# Patient Record
Sex: MALE | Race: WHITE | Employment: OTHER | URBAN - METROPOLITAN AREA
[De-identification: names, ages, dates, MRNs, and addresses within clinical notes are randomized per-mention and may not be internally consistent; named-entity substitution may affect disease eponyms.]

---

## 2017-08-18 PROBLEM — F33.2 SEVERE EPISODE OF RECURRENT MAJOR DEPRESSIVE DISORDER, WITHOUT PSYCHOTIC FEATURES (HCC): Status: ACTIVE | Noted: 2017-08-18

## 2018-12-26 ENCOUNTER — HOSPITAL ENCOUNTER (INPATIENT)
Age: 48
LOS: 5 days | Discharge: HOME | DRG: 751 | End: 2018-12-31
Attending: EMERGENCY MEDICINE | Admitting: PSYCHIATRY & NEUROLOGY
Payer: MEDICAID

## 2018-12-26 DIAGNOSIS — R45.851 DEPRESSION WITH SUICIDAL IDEATION: Primary | ICD-10-CM

## 2018-12-26 DIAGNOSIS — F32.A DEPRESSION WITH SUICIDAL IDEATION: Primary | ICD-10-CM

## 2018-12-26 PROBLEM — F33.9 DEPRESSION, MAJOR, RECURRENT (HCC): Status: ACTIVE | Noted: 2018-12-26

## 2018-12-26 LAB
ACETAMINOPHEN LEVEL: <5 MCG/ML (ref 10–30)
ALBUMIN SERPL-MCNC: 3.9 G/DL (ref 3.5–5.2)
ALP BLD-CCNC: 107 U/L (ref 40–129)
ALT SERPL-CCNC: 20 U/L (ref 0–40)
AMPHETAMINE SCREEN, URINE: NOT DETECTED
ANION GAP SERPL CALCULATED.3IONS-SCNC: 10 MMOL/L (ref 7–16)
AST SERPL-CCNC: 19 U/L (ref 0–39)
BARBITURATE SCREEN URINE: NOT DETECTED
BASOPHILS ABSOLUTE: 0.06 E9/L (ref 0–0.2)
BASOPHILS RELATIVE PERCENT: 0.9 % (ref 0–2)
BENZODIAZEPINE SCREEN, URINE: NOT DETECTED
BILIRUB SERPL-MCNC: 1.5 MG/DL (ref 0–1.2)
BUN BLDV-MCNC: 6 MG/DL (ref 6–20)
CALCIUM SERPL-MCNC: 9.1 MG/DL (ref 8.6–10.2)
CANNABINOID SCREEN URINE: NOT DETECTED
CHLORIDE BLD-SCNC: 98 MMOL/L (ref 98–107)
CO2: 30 MMOL/L (ref 22–29)
COCAINE METABOLITE SCREEN URINE: NOT DETECTED
CREAT SERPL-MCNC: 0.9 MG/DL (ref 0.7–1.2)
EKG ATRIAL RATE: 104 BPM
EKG P AXIS: 70 DEGREES
EKG P-R INTERVAL: 152 MS
EKG Q-T INTERVAL: 344 MS
EKG QRS DURATION: 70 MS
EKG QTC CALCULATION (BAZETT): 452 MS
EKG R AXIS: 53 DEGREES
EKG T AXIS: 44 DEGREES
EKG VENTRICULAR RATE: 104 BPM
EOSINOPHILS ABSOLUTE: 0.45 E9/L (ref 0.05–0.5)
EOSINOPHILS RELATIVE PERCENT: 6.4 % (ref 0–6)
ETHANOL: <10 MG/DL (ref 0–0.08)
GFR AFRICAN AMERICAN: >60
GFR NON-AFRICAN AMERICAN: >60 ML/MIN/1.73
GLUCOSE BLD-MCNC: 98 MG/DL (ref 74–99)
HCT VFR BLD CALC: 43.6 % (ref 37–54)
HEMOGLOBIN: 14.2 G/DL (ref 12.5–16.5)
IMMATURE GRANULOCYTES #: 0.04 E9/L
IMMATURE GRANULOCYTES %: 0.6 % (ref 0–5)
LYMPHOCYTES ABSOLUTE: 1.11 E9/L (ref 1.5–4)
LYMPHOCYTES RELATIVE PERCENT: 15.8 % (ref 20–42)
MCH RBC QN AUTO: 26.6 PG (ref 26–35)
MCHC RBC AUTO-ENTMCNC: 32.6 % (ref 32–34.5)
MCV RBC AUTO: 81.8 FL (ref 80–99.9)
METHADONE SCREEN, URINE: NOT DETECTED
MONOCYTES ABSOLUTE: 0.37 E9/L (ref 0.1–0.95)
MONOCYTES RELATIVE PERCENT: 5.3 % (ref 2–12)
NEUTROPHILS ABSOLUTE: 4.98 E9/L (ref 1.8–7.3)
NEUTROPHILS RELATIVE PERCENT: 71 % (ref 43–80)
OPIATE SCREEN URINE: NOT DETECTED
PDW BLD-RTO: 13.1 FL (ref 11.5–15)
PHENCYCLIDINE SCREEN URINE: NOT DETECTED
PLATELET # BLD: 322 E9/L (ref 130–450)
PMV BLD AUTO: 9.4 FL (ref 7–12)
POTASSIUM SERPL-SCNC: 3.7 MMOL/L (ref 3.5–5)
PROPOXYPHENE SCREEN: NOT DETECTED
RBC # BLD: 5.33 E12/L (ref 3.8–5.8)
SALICYLATE, SERUM: <0.3 MG/DL (ref 0–30)
SODIUM BLD-SCNC: 138 MMOL/L (ref 132–146)
TOTAL PROTEIN: 7.3 G/DL (ref 6.4–8.3)
TRICYCLIC ANTIDEPRESSANTS SCREEN SERUM: NEGATIVE NG/ML
WBC # BLD: 7 E9/L (ref 4.5–11.5)

## 2018-12-26 PROCEDURE — 80307 DRUG TEST PRSMV CHEM ANLYZR: CPT

## 2018-12-26 PROCEDURE — 36415 COLL VENOUS BLD VENIPUNCTURE: CPT

## 2018-12-26 PROCEDURE — 1240000000 HC EMOTIONAL WELLNESS R&B

## 2018-12-26 PROCEDURE — 6370000000 HC RX 637 (ALT 250 FOR IP): Performed by: NURSE PRACTITIONER

## 2018-12-26 PROCEDURE — 99285 EMERGENCY DEPT VISIT HI MDM: CPT

## 2018-12-26 PROCEDURE — 85025 COMPLETE CBC W/AUTO DIFF WBC: CPT

## 2018-12-26 PROCEDURE — 80053 COMPREHEN METABOLIC PANEL: CPT

## 2018-12-26 PROCEDURE — G0480 DRUG TEST DEF 1-7 CLASSES: HCPCS

## 2018-12-26 RX ORDER — VENLAFAXINE HYDROCHLORIDE 150 MG/1
150 CAPSULE, EXTENDED RELEASE ORAL EVERY MORNING
Status: ON HOLD | COMMUNITY
End: 2018-12-31 | Stop reason: HOSPADM

## 2018-12-26 RX ORDER — TRAZODONE HYDROCHLORIDE 50 MG/1
150 TABLET ORAL ONCE
Status: COMPLETED | OUTPATIENT
Start: 2018-12-26 | End: 2018-12-26

## 2018-12-26 RX ORDER — TRAZODONE HYDROCHLORIDE 150 MG/1
150 TABLET ORAL NIGHTLY PRN
COMMUNITY
End: 2022-07-24

## 2018-12-26 RX ADMIN — TRAZODONE HYDROCHLORIDE 150 MG: 50 TABLET ORAL at 21:26

## 2018-12-26 ASSESSMENT — PATIENT HEALTH QUESTIONNAIRE - PHQ9: SUM OF ALL RESPONSES TO PHQ QUESTIONS 1-9: 10

## 2018-12-27 PROBLEM — F32.2 SEVERE MAJOR DEPRESSION WITHOUT PSYCHOTIC FEATURES (HCC): Status: ACTIVE | Noted: 2018-12-27

## 2018-12-27 LAB
CHOLESTEROL, TOTAL: 257 MG/DL (ref 0–199)
HBA1C MFR BLD: 5.4 % (ref 4–5.6)
HDLC SERPL-MCNC: 50 MG/DL
LDL CHOLESTEROL CALCULATED: 148 MG/DL (ref 0–99)
TRIGL SERPL-MCNC: 296 MG/DL (ref 0–149)
VLDLC SERPL CALC-MCNC: 59 MG/DL

## 2018-12-27 PROCEDURE — 6370000000 HC RX 637 (ALT 250 FOR IP): Performed by: NURSE PRACTITIONER

## 2018-12-27 PROCEDURE — 80061 LIPID PANEL: CPT

## 2018-12-27 PROCEDURE — 83036 HEMOGLOBIN GLYCOSYLATED A1C: CPT

## 2018-12-27 PROCEDURE — 36415 COLL VENOUS BLD VENIPUNCTURE: CPT

## 2018-12-27 PROCEDURE — 99222 1ST HOSP IP/OBS MODERATE 55: CPT | Performed by: PSYCHIATRY & NEUROLOGY

## 2018-12-27 PROCEDURE — 1240000000 HC EMOTIONAL WELLNESS R&B

## 2018-12-27 RX ORDER — ACETAMINOPHEN 325 MG/1
650 TABLET ORAL EVERY 4 HOURS PRN
Status: DISCONTINUED | OUTPATIENT
Start: 2018-12-27 | End: 2018-12-31 | Stop reason: HOSPADM

## 2018-12-27 RX ORDER — TRAZODONE HYDROCHLORIDE 50 MG/1
50 TABLET ORAL NIGHTLY PRN
Status: DISCONTINUED | OUTPATIENT
Start: 2018-12-27 | End: 2018-12-28

## 2018-12-27 RX ORDER — BENZTROPINE MESYLATE 1 MG/ML
2 INJECTION INTRAMUSCULAR; INTRAVENOUS 2 TIMES DAILY PRN
Status: DISCONTINUED | OUTPATIENT
Start: 2018-12-27 | End: 2018-12-31 | Stop reason: HOSPADM

## 2018-12-27 RX ORDER — ARIPIPRAZOLE 10 MG/1
10 TABLET ORAL NIGHTLY
Status: DISCONTINUED | OUTPATIENT
Start: 2018-12-27 | End: 2018-12-31 | Stop reason: HOSPADM

## 2018-12-27 RX ORDER — HYDROXYZINE PAMOATE 50 MG/1
50 CAPSULE ORAL EVERY 6 HOURS PRN
Status: DISCONTINUED | OUTPATIENT
Start: 2018-12-27 | End: 2018-12-31 | Stop reason: HOSPADM

## 2018-12-27 RX ORDER — HALOPERIDOL 5 MG/ML
10 INJECTION INTRAMUSCULAR EVERY 6 HOURS PRN
Status: DISCONTINUED | OUTPATIENT
Start: 2018-12-27 | End: 2018-12-31 | Stop reason: HOSPADM

## 2018-12-27 RX ORDER — VENLAFAXINE HYDROCHLORIDE 150 MG/1
150 CAPSULE, EXTENDED RELEASE ORAL EVERY MORNING
Status: DISCONTINUED | OUTPATIENT
Start: 2018-12-27 | End: 2018-12-31 | Stop reason: HOSPADM

## 2018-12-27 RX ORDER — OLANZAPINE 10 MG/1
10 TABLET ORAL
Status: ACTIVE | OUTPATIENT
Start: 2018-12-27 | End: 2018-12-27

## 2018-12-27 RX ORDER — MAGNESIUM HYDROXIDE/ALUMINUM HYDROXICE/SIMETHICONE 120; 1200; 1200 MG/30ML; MG/30ML; MG/30ML
30 SUSPENSION ORAL PRN
Status: DISCONTINUED | OUTPATIENT
Start: 2018-12-27 | End: 2018-12-31 | Stop reason: HOSPADM

## 2018-12-27 RX ADMIN — VENLAFAXINE HYDROCHLORIDE 150 MG: 150 CAPSULE, EXTENDED RELEASE ORAL at 09:47

## 2018-12-27 RX ADMIN — VITAMIN D, TAB 1000IU (100/BT) 1000 UNITS: 25 TAB at 15:19

## 2018-12-27 RX ADMIN — ARIPIPRAZOLE 10 MG: 10 TABLET ORAL at 20:19

## 2018-12-27 RX ADMIN — TRAZODONE HYDROCHLORIDE 50 MG: 50 TABLET ORAL at 20:19

## 2018-12-27 ASSESSMENT — PAIN SCALES - GENERAL
PAINLEVEL_OUTOF10: 0
PAINLEVEL_OUTOF10: 0

## 2018-12-27 ASSESSMENT — LIFESTYLE VARIABLES: HISTORY_ALCOHOL_USE: NO

## 2018-12-27 ASSESSMENT — SLEEP AND FATIGUE QUESTIONNAIRES
RESTFUL SLEEP: NO
DIFFICULTY ARISING: YES
DIFFICULTY STAYING ASLEEP: YES
DO YOU HAVE DIFFICULTY SLEEPING: YES
SLEEP PATTERN: DIFFICULTY FALLING ASLEEP;DIFFICULTY ARISING;RESTLESSNESS
DO YOU USE A SLEEP AID: YES
DIFFICULTY FALLING ASLEEP: YES
AVERAGE NUMBER OF SLEEP HOURS: 5

## 2018-12-27 ASSESSMENT — PATIENT HEALTH QUESTIONNAIRE - PHQ9: SUM OF ALL RESPONSES TO PHQ QUESTIONS 1-9: 24

## 2018-12-28 PROCEDURE — 6370000000 HC RX 637 (ALT 250 FOR IP): Performed by: NURSE PRACTITIONER

## 2018-12-28 PROCEDURE — 1240000000 HC EMOTIONAL WELLNESS R&B

## 2018-12-28 PROCEDURE — 99231 SBSQ HOSP IP/OBS SF/LOW 25: CPT | Performed by: NURSE PRACTITIONER

## 2018-12-28 RX ORDER — TRAZODONE HYDROCHLORIDE 150 MG/1
150 TABLET ORAL NIGHTLY PRN
Status: DISCONTINUED | OUTPATIENT
Start: 2018-12-28 | End: 2018-12-31 | Stop reason: HOSPADM

## 2018-12-28 RX ADMIN — HYDROXYZINE PAMOATE 50 MG: 50 CAPSULE ORAL at 20:35

## 2018-12-28 RX ADMIN — ARIPIPRAZOLE 10 MG: 10 TABLET ORAL at 20:34

## 2018-12-28 RX ADMIN — TRAZODONE HYDROCHLORIDE 150 MG: 150 TABLET ORAL at 20:34

## 2018-12-28 RX ADMIN — VITAMIN D, TAB 1000IU (100/BT) 1000 UNITS: 25 TAB at 08:55

## 2018-12-28 RX ADMIN — VENLAFAXINE HYDROCHLORIDE 150 MG: 150 CAPSULE, EXTENDED RELEASE ORAL at 08:54

## 2018-12-28 ASSESSMENT — PAIN SCALES - GENERAL: PAINLEVEL_OUTOF10: 0

## 2018-12-29 PROCEDURE — 6370000000 HC RX 637 (ALT 250 FOR IP): Performed by: NURSE PRACTITIONER

## 2018-12-29 PROCEDURE — 99231 SBSQ HOSP IP/OBS SF/LOW 25: CPT | Performed by: NURSE PRACTITIONER

## 2018-12-29 PROCEDURE — 1240000000 HC EMOTIONAL WELLNESS R&B

## 2018-12-29 RX ADMIN — ARIPIPRAZOLE 10 MG: 10 TABLET ORAL at 20:40

## 2018-12-29 RX ADMIN — TRAZODONE HYDROCHLORIDE 150 MG: 150 TABLET ORAL at 20:40

## 2018-12-29 RX ADMIN — VITAMIN D, TAB 1000IU (100/BT) 1000 UNITS: 25 TAB at 08:50

## 2018-12-29 RX ADMIN — VENLAFAXINE HYDROCHLORIDE 150 MG: 150 CAPSULE, EXTENDED RELEASE ORAL at 08:49

## 2018-12-30 PROCEDURE — 1240000000 HC EMOTIONAL WELLNESS R&B

## 2018-12-30 PROCEDURE — 6370000000 HC RX 637 (ALT 250 FOR IP): Performed by: NURSE PRACTITIONER

## 2018-12-30 PROCEDURE — 99231 SBSQ HOSP IP/OBS SF/LOW 25: CPT | Performed by: NURSE PRACTITIONER

## 2018-12-30 RX ADMIN — VENLAFAXINE HYDROCHLORIDE 150 MG: 150 CAPSULE, EXTENDED RELEASE ORAL at 08:28

## 2018-12-30 RX ADMIN — VITAMIN D, TAB 1000IU (100/BT) 1000 UNITS: 25 TAB at 08:28

## 2018-12-30 RX ADMIN — ARIPIPRAZOLE 10 MG: 10 TABLET ORAL at 21:14

## 2018-12-30 RX ADMIN — TRAZODONE HYDROCHLORIDE 150 MG: 150 TABLET ORAL at 21:14

## 2018-12-30 ASSESSMENT — PAIN SCALES - GENERAL: PAINLEVEL_OUTOF10: 0

## 2018-12-31 VITALS
HEIGHT: 60 IN | DIASTOLIC BLOOD PRESSURE: 92 MMHG | RESPIRATION RATE: 14 BRPM | HEART RATE: 105 BPM | OXYGEN SATURATION: 95 % | WEIGHT: 172 LBS | BODY MASS INDEX: 33.77 KG/M2 | TEMPERATURE: 97.7 F | SYSTOLIC BLOOD PRESSURE: 142 MMHG

## 2018-12-31 PROCEDURE — 6370000000 HC RX 637 (ALT 250 FOR IP): Performed by: NURSE PRACTITIONER

## 2018-12-31 PROCEDURE — 99238 HOSP IP/OBS DSCHRG MGMT 30/<: CPT | Performed by: NURSE PRACTITIONER

## 2018-12-31 RX ORDER — VENLAFAXINE HYDROCHLORIDE 150 MG/1
150 CAPSULE, EXTENDED RELEASE ORAL EVERY MORNING
Qty: 30 CAPSULE | Refills: 0 | Status: SHIPPED | OUTPATIENT
Start: 2019-01-01

## 2018-12-31 RX ORDER — ARIPIPRAZOLE 10 MG/1
10 TABLET ORAL NIGHTLY
Qty: 30 TABLET | Refills: 0 | Status: SHIPPED | OUTPATIENT
Start: 2018-12-31 | End: 2020-01-14 | Stop reason: DRUGHIGH

## 2018-12-31 RX ADMIN — VITAMIN D, TAB 1000IU (100/BT) 1000 UNITS: 25 TAB at 08:53

## 2018-12-31 RX ADMIN — VENLAFAXINE HYDROCHLORIDE 150 MG: 150 CAPSULE, EXTENDED RELEASE ORAL at 08:53

## 2018-12-31 RX ADMIN — ACETAMINOPHEN 650 MG: 325 TABLET, FILM COATED ORAL at 08:50

## 2018-12-31 ASSESSMENT — PAIN DESCRIPTION - LOCATION: LOCATION: HEAD

## 2018-12-31 ASSESSMENT — PAIN DESCRIPTION - PROGRESSION: CLINICAL_PROGRESSION: GRADUALLY WORSENING

## 2018-12-31 ASSESSMENT — PAIN DESCRIPTION - FREQUENCY: FREQUENCY: CONTINUOUS

## 2018-12-31 ASSESSMENT — PAIN DESCRIPTION - PAIN TYPE: TYPE: ACUTE PAIN

## 2018-12-31 ASSESSMENT — PAIN DESCRIPTION - ONSET: ONSET: ON-GOING

## 2018-12-31 ASSESSMENT — PAIN DESCRIPTION - DESCRIPTORS: DESCRIPTORS: HEADACHE

## 2018-12-31 ASSESSMENT — PAIN SCALES - GENERAL
PAINLEVEL_OUTOF10: 5
PAINLEVEL_OUTOF10: 7

## 2018-12-31 ASSESSMENT — PAIN DESCRIPTION - ORIENTATION: ORIENTATION: RIGHT;LEFT;ANTERIOR

## 2019-08-16 ENCOUNTER — HOSPITAL ENCOUNTER (EMERGENCY)
Age: 49
Discharge: HOME | End: 2019-08-16
Payer: MEDICAID

## 2019-08-16 VITALS
HEART RATE: 85 BPM | TEMPERATURE: 97.2 F | HEIGHT: 60 IN | OXYGEN SATURATION: 95 % | DIASTOLIC BLOOD PRESSURE: 70 MMHG | RESPIRATION RATE: 16 BRPM | SYSTOLIC BLOOD PRESSURE: 138 MMHG | WEIGHT: 170 LBS | BODY MASS INDEX: 33.38 KG/M2

## 2019-08-16 DIAGNOSIS — L03.116 CELLULITIS OF LEFT LOWER EXTREMITY: ICD-10-CM

## 2019-08-16 DIAGNOSIS — S81.812A SKIN TEAR OF LEFT LOWER LEG WITHOUT COMPLICATION, INITIAL ENCOUNTER: Primary | ICD-10-CM

## 2019-08-16 PROCEDURE — 6370000000 HC RX 637 (ALT 250 FOR IP): Performed by: NURSE PRACTITIONER

## 2019-08-16 PROCEDURE — 6360000002 HC RX W HCPCS: Performed by: NURSE PRACTITIONER

## 2019-08-16 PROCEDURE — 90715 TDAP VACCINE 7 YRS/> IM: CPT | Performed by: NURSE PRACTITIONER

## 2019-08-16 PROCEDURE — 90471 IMMUNIZATION ADMIN: CPT | Performed by: NURSE PRACTITIONER

## 2019-08-16 PROCEDURE — 99282 EMERGENCY DEPT VISIT SF MDM: CPT

## 2019-08-16 RX ORDER — DIAPER,BRIEF,INFANT-TODD,DISP
EACH MISCELLANEOUS ONCE
Status: COMPLETED | OUTPATIENT
Start: 2019-08-16 | End: 2019-08-16

## 2019-08-16 RX ORDER — BACITRACIN, NEOMYCIN, POLYMYXIN B 400; 3.5; 5 [USP'U]/G; MG/G; [USP'U]/G
OINTMENT TOPICAL
Qty: 30 G | Refills: 0 | Status: SHIPPED | OUTPATIENT
Start: 2019-08-16 | End: 2019-08-26

## 2019-08-16 RX ORDER — DOXYCYCLINE HYCLATE 100 MG
100 TABLET ORAL 2 TIMES DAILY
Qty: 20 TABLET | Refills: 0 | Status: SHIPPED | OUTPATIENT
Start: 2019-08-16 | End: 2019-08-21

## 2019-08-16 RX ORDER — DOXYCYCLINE HYCLATE 100 MG/1
100 CAPSULE ORAL ONCE
Status: COMPLETED | OUTPATIENT
Start: 2019-08-16 | End: 2019-08-16

## 2019-08-16 RX ORDER — OXYCODONE HYDROCHLORIDE AND ACETAMINOPHEN 5; 325 MG/1; MG/1
1 TABLET ORAL ONCE
Status: COMPLETED | OUTPATIENT
Start: 2019-08-16 | End: 2019-08-16

## 2019-08-16 RX ADMIN — BACITRACIN ZINC: 500 OINTMENT TOPICAL at 21:52

## 2019-08-16 RX ADMIN — DOXYCYCLINE HYCLATE 100 MG: 100 CAPSULE ORAL at 21:51

## 2019-08-16 RX ADMIN — TETANUS TOXOID, REDUCED DIPHTHERIA TOXOID AND ACELLULAR PERTUSSIS VACCINE, ADSORBED 0.5 ML: 5; 2.5; 8; 8; 2.5 SUSPENSION INTRAMUSCULAR at 21:51

## 2019-08-16 RX ADMIN — OXYCODONE HYDROCHLORIDE AND ACETAMINOPHEN 1 TABLET: 5; 325 TABLET ORAL at 21:51

## 2019-08-16 ASSESSMENT — PAIN DESCRIPTION - LOCATION: LOCATION: LEG

## 2019-08-16 ASSESSMENT — PAIN DESCRIPTION - DESCRIPTORS: DESCRIPTORS: ACHING

## 2019-08-16 ASSESSMENT — PAIN DESCRIPTION - ORIENTATION: ORIENTATION: LEFT;LOWER

## 2019-08-16 ASSESSMENT — PAIN DESCRIPTION - PAIN TYPE: TYPE: ACUTE PAIN

## 2019-08-16 ASSESSMENT — PAIN SCALES - GENERAL
PAINLEVEL_OUTOF10: 7
PAINLEVEL_OUTOF10: 7

## 2019-08-16 ASSESSMENT — PAIN DESCRIPTION - FREQUENCY: FREQUENCY: CONTINUOUS

## 2019-08-17 NOTE — ED PROVIDER NOTES
to display       ------------------------- NURSING NOTES AND VITALS REVIEWED ---------------------------   The nursing notes within the ED encounter and vital signs as below have been reviewed. BP (!) 152/77   Pulse 105   Temp 96.9 °F (36.1 °C) (Temporal)   Resp 16   Ht 5' (1.524 m)   Wt 170 lb (77.1 kg)   SpO2 94%   BMI 33.20 kg/m²   Oxygen Saturation Interpretation: Normal      ---------------------------------------------------PHYSICAL EXAM--------------------------------------      Constitutional/General: Alert and oriented x3, well appearing, non toxic in NAD  Head: Normocephalic and atraumatic  Eyes: PERRL, EOMI  Mouth: Oropharynx clear, handling secretions, no trismus  Neck: Supple, full ROM,   Pulmonary: Lungs clear to auscultation bilaterally, no wheezes, rales, or rhonchi. Not in respiratory distress  Cardiovascular:  Regular rate and rhythm, no murmurs, gallops, or rubs. 2+ distal pulses  Abdomen: Soft, non tender, non distended,   Extremities: Moves all extremities x 4. Warm and well perfused, sensations fully intact. No lower extremity swelling noted. 2+ pulses. Strength strong and equal  Skin: warm and dry without rash, left lateral leg with skin tear with yellow center wound and surrounding erythema. Neurologic: GCS 15,  Psych: Normal Affect      ------------------------------ ED COURSE/MEDICAL DECISION MAKING----------------------  Medications   bacitracin zinc ointment ( Topical Given 8/16/19 5802)   Tetanus-Diphth-Acell Pertussis (BOOSTRIX) injection 0.5 mL (0.5 mLs Intramuscular Given 8/16/19 2151)   doxycycline hyclate (VIBRAMYCIN) capsule 100 mg (100 mg Oral Given 8/16/19 2151)   oxyCODONE-acetaminophen (PERCOCET) 5-325 MG per tablet 1 tablet (1 tablet Oral Given 8/16/19 2151)         ED COURSE:       Medical Decision Making: Plan will be for wound care will also provide patient with tetanus immunization.   Wound appears to be skin tear that has just not been kept clean little bit a

## 2019-08-21 ENCOUNTER — OFFICE VISIT (OUTPATIENT)
Dept: INTERNAL MEDICINE | Age: 49
End: 2019-08-21
Payer: MEDICAID

## 2019-08-21 VITALS
HEIGHT: 63 IN | DIASTOLIC BLOOD PRESSURE: 75 MMHG | RESPIRATION RATE: 12 BRPM | TEMPERATURE: 97.9 F | BODY MASS INDEX: 29.23 KG/M2 | WEIGHT: 165 LBS | HEART RATE: 92 BPM | SYSTOLIC BLOOD PRESSURE: 140 MMHG

## 2019-08-21 DIAGNOSIS — R21 SKIN RASH: ICD-10-CM

## 2019-08-21 DIAGNOSIS — Z85.828 HISTORY OF BASAL CELL CARCINOMA: ICD-10-CM

## 2019-08-21 DIAGNOSIS — L55.9 SUNBURN: Primary | ICD-10-CM

## 2019-08-21 PROCEDURE — 99202 OFFICE O/P NEW SF 15 MIN: CPT | Performed by: INTERNAL MEDICINE

## 2019-08-21 PROCEDURE — 99203 OFFICE O/P NEW LOW 30 MIN: CPT | Performed by: INTERNAL MEDICINE

## 2019-08-21 ASSESSMENT — ENCOUNTER SYMPTOMS
SHORTNESS OF BREATH: 0
SORE THROAT: 0
BACK PAIN: 0
ABDOMINAL PAIN: 0
WHEEZING: 0

## 2019-08-21 NOTE — PATIENT INSTRUCTIONS
Continue current ointment. Keep skin moisture. Dermatologist referral was sent. Wear sunscreen cream when outside. Come back to clinic in next couple of weeks to establish care.     Electronically signed by Tristan Gilbert MD on 8/21/2019 at 11:03 AM

## 2019-09-24 ENCOUNTER — TELEPHONE (OUTPATIENT)
Dept: INTERNAL MEDICINE | Age: 49
End: 2019-09-24

## 2019-09-30 ENCOUNTER — OFFICE VISIT (OUTPATIENT)
Dept: INTERNAL MEDICINE | Age: 49
End: 2019-09-30
Payer: MEDICAID

## 2019-09-30 VITALS
SYSTOLIC BLOOD PRESSURE: 134 MMHG | HEART RATE: 87 BPM | HEIGHT: 60 IN | TEMPERATURE: 97.6 F | RESPIRATION RATE: 16 BRPM | DIASTOLIC BLOOD PRESSURE: 81 MMHG | BODY MASS INDEX: 32.14 KG/M2 | WEIGHT: 163.7 LBS

## 2019-09-30 DIAGNOSIS — L55.9 BURN FROM THE SUN: ICD-10-CM

## 2019-09-30 DIAGNOSIS — E78.2 MIXED HYPERLIPIDEMIA: ICD-10-CM

## 2019-09-30 DIAGNOSIS — E66.1 CLASS 1 DRUG-INDUCED OBESITY WITHOUT SERIOUS COMORBIDITY WITH BODY MASS INDEX (BMI) OF 31.0 TO 31.9 IN ADULT: ICD-10-CM

## 2019-09-30 DIAGNOSIS — C44.91 BASAL CELL CARCINOMA (BCC), UNSPECIFIED SITE: Primary | ICD-10-CM

## 2019-09-30 DIAGNOSIS — Z00.00 HEALTHCARE MAINTENANCE: ICD-10-CM

## 2019-09-30 PROBLEM — E66.811 CLASS 1 DRUG-INDUCED OBESITY IN ADULT: Status: ACTIVE | Noted: 2019-09-30

## 2019-09-30 PROBLEM — F32.2 SEVERE MAJOR DEPRESSION WITHOUT PSYCHOTIC FEATURES (HCC): Status: RESOLVED | Noted: 2018-12-27 | Resolved: 2019-09-30

## 2019-09-30 PROBLEM — F33.2 SEVERE EPISODE OF RECURRENT MAJOR DEPRESSIVE DISORDER, WITHOUT PSYCHOTIC FEATURES (HCC): Status: RESOLVED | Noted: 2017-08-18 | Resolved: 2019-09-30

## 2019-09-30 PROBLEM — E78.5 HYPERLIPEMIA: Status: ACTIVE | Noted: 2019-09-30

## 2019-09-30 PROCEDURE — G8417 CALC BMI ABV UP PARAM F/U: HCPCS | Performed by: STUDENT IN AN ORGANIZED HEALTH CARE EDUCATION/TRAINING PROGRAM

## 2019-09-30 PROCEDURE — 99214 OFFICE O/P EST MOD 30 MIN: CPT | Performed by: STUDENT IN AN ORGANIZED HEALTH CARE EDUCATION/TRAINING PROGRAM

## 2019-09-30 PROCEDURE — 1036F TOBACCO NON-USER: CPT | Performed by: STUDENT IN AN ORGANIZED HEALTH CARE EDUCATION/TRAINING PROGRAM

## 2019-09-30 PROCEDURE — G8427 DOCREV CUR MEDS BY ELIG CLIN: HCPCS | Performed by: STUDENT IN AN ORGANIZED HEALTH CARE EDUCATION/TRAINING PROGRAM

## 2019-09-30 PROCEDURE — 99212 OFFICE O/P EST SF 10 MIN: CPT | Performed by: STUDENT IN AN ORGANIZED HEALTH CARE EDUCATION/TRAINING PROGRAM

## 2019-09-30 ASSESSMENT — ENCOUNTER SYMPTOMS
NAUSEA: 0
VOMITING: 0
CONSTIPATION: 0
ABDOMINAL PAIN: 0
SORE THROAT: 0
BLOOD IN STOOL: 0
BACK PAIN: 0
DIARRHEA: 0
COUGH: 0
SHORTNESS OF BREATH: 0
WHEEZING: 0
SINUS PAIN: 0

## 2019-09-30 ASSESSMENT — PATIENT HEALTH QUESTIONNAIRE - PHQ9
SUM OF ALL RESPONSES TO PHQ9 QUESTIONS 1 & 2: 2
1. LITTLE INTEREST OR PLEASURE IN DOING THINGS: 1
SUM OF ALL RESPONSES TO PHQ QUESTIONS 1-9: 2
SUM OF ALL RESPONSES TO PHQ QUESTIONS 1-9: 2
2. FEELING DOWN, DEPRESSED OR HOPELESS: 1

## 2019-09-30 NOTE — PATIENT INSTRUCTIONS
Patient Education        Preventing Falls: Care Instructions  Your Care Instructions    Getting around your home safely can be a challenge if you have injuries or health problems that make it easy for you to fall. Loose rugs and furniture in walkways are among the dangers for many older people who have problems walking or who have poor eyesight. People who have conditions such as arthritis, osteoporosis, or dementia also have to be careful not to fall. You can make your home safer with a few simple measures. Follow-up care is a key part of your treatment and safety. Be sure to make and go to all appointments, and call your doctor if you are having problems. It's also a good idea to know your test results and keep a list of the medicines you take. How can you care for yourself at home? Taking care of yourself  · You may get dizzy if you do not drink enough water. To prevent dehydration, drink plenty of fluids, enough so that your urine is light yellow or clear like water. Choose water and other caffeine-free clear liquids. If you have kidney, heart, or liver disease and have to limit fluids, talk with your doctor before you increase the amount of fluids you drink. · Exercise regularly to improve your strength, muscle tone, and balance. Walk if you can. Swimming may be a good choice if you cannot walk easily. · Have your vision and hearing checked each year or any time you notice a change. If you have trouble seeing and hearing, you might not be able to avoid objects and could lose your balance. · Know the side effects of the medicines you take. Ask your doctor or pharmacist whether the medicines you take can affect your balance. Sleeping pills or sedatives can affect your balance. · Limit the amount of alcohol you drink. Alcohol can impair your balance and other senses. · Ask your doctor whether calluses or corns on your feet need to be removed.  If you wear loose-fitting shoes because of calluses or corns, that will allow you to sit while showering. · Get into a tub or shower by putting the weaker leg in first. Get out of a tub or shower with your strong side first.  · Repair loose toilet seats and consider installing a raised toilet seat to make getting on and off the toilet easier. · Keep your bathroom door unlocked while you are in the shower. Where can you learn more? Go to https://chpepiceweb.Vanu Coverage. org and sign in to your Finisart account. Enter [id number] in the V-Key box to learn more about \"Preventing Falls: Care Instructions. \"     If you do not have an account, please click on the \"Sign Up Now\" link. Current as of: November 7, 2018  Content Version: 12.1  © 9654-3051 Healthwise, Incorporated. Care instructions adapted under license by [facility] ([facility]). If you have questions about a medical condition or this instruction, always ask your healthcare professional. [place] [address] any warranty or liability for your use of this information. Patient Education        Learning About Living Ursula Alan  What is a living will? A living will is a legal form you use to write down the kind of care you want at the end of your life. It is used by the health professionals who will treat you if you aren't able to decide for yourself. If you put your wishes in writing, your loved ones and others will know what kind of care you want. They won't need to guess. This can ease your mind and be helpful to others. A living will is not the same as an estate or property will. An estate will explains what you want to happen with your money and property after you die. Is a living will a legal document? A living will is a legal document. Each state has its own laws about living spencer. If you move to another state, make sure that your living will is legal in the state where you now live. Or you might use a universal form that has been approved by many states.  This kind of form can life-support methods? Would you want to be able to walk? To speak? To eat on your own? To live without the help of machines? · If you have a choice, where do you want to be cared for? In your home? At a hospital or nursing home? · Do you want certain Synagogue practices performed if you become very ill? · If you have a choice at the end of your life, where would you prefer to die? At home? In a hospital or nursing home? Somewhere else? · Would you prefer to be buried or cremated? · Do you want your organs to be donated after you die? What should you do with your living will? · Make sure that your family members and your health care agent have copies of your living will. · Give your doctor a copy of your living will to keep in your medical record. If you have more than one doctor, make sure that each one has a copy. · You may want to put a copy of your living will where it can be easily found. Where can you learn more? Go to https://Zapya.Zola Books. org and sign in to your TEVIZZ account. Enter E773 in the [place] Lights box to learn more about \"Learning About Living Lovella Schooling. \"     If you do not have an account, please click on the \"Sign Up Now\" link. Current as of: April 1, 2019  Content Version: 12.1  © 4401-5667 Healthwise, Incorporated. Care instructions adapted under license by [facility] ([facility]). If you have questions about a medical condition or this instruction, always ask your healthcare professional. [place] [address] any warranty or liability for your use of this information. 1-Follow up in 3 months. 2-Follow with Dermatology clinic,please call your insurance to set up an appointment. 3-Please do the required lab 2 weeks prior to your scheduled next visit.

## 2020-01-14 ENCOUNTER — OFFICE VISIT (OUTPATIENT)
Dept: INTERNAL MEDICINE | Age: 50
End: 2020-01-14
Payer: MEDICAID

## 2020-01-14 ENCOUNTER — TELEPHONE (OUTPATIENT)
Dept: INTERNAL MEDICINE | Age: 50
End: 2020-01-14

## 2020-01-14 ENCOUNTER — HOSPITAL ENCOUNTER (OUTPATIENT)
Age: 50
Discharge: HOME | End: 2020-01-14
Payer: MEDICAID

## 2020-01-14 VITALS
WEIGHT: 158 LBS | RESPIRATION RATE: 12 BRPM | HEIGHT: 60 IN | BODY MASS INDEX: 31.02 KG/M2 | DIASTOLIC BLOOD PRESSURE: 63 MMHG | HEART RATE: 91 BPM | SYSTOLIC BLOOD PRESSURE: 123 MMHG | TEMPERATURE: 97.8 F

## 2020-01-14 LAB
ALBUMIN SERPL-MCNC: 4.8 G/DL (ref 3.5–5.2)
ALP BLD-CCNC: 93 U/L (ref 40–129)
ALT SERPL-CCNC: 25 U/L (ref 0–40)
ANION GAP SERPL CALCULATED.3IONS-SCNC: 15 MMOL/L (ref 7–16)
AST SERPL-CCNC: 21 U/L (ref 0–39)
BILIRUB SERPL-MCNC: 0.7 MG/DL (ref 0–1.2)
BUN BLDV-MCNC: 8 MG/DL (ref 6–20)
CALCIUM SERPL-MCNC: 9.9 MG/DL (ref 8.6–10.2)
CHLORIDE BLD-SCNC: 96 MMOL/L (ref 98–107)
CHOLESTEROL, TOTAL: 285 MG/DL (ref 0–199)
CO2: 27 MMOL/L (ref 22–29)
CREAT SERPL-MCNC: 1 MG/DL (ref 0.7–1.2)
GFR AFRICAN AMERICAN: >60
GFR NON-AFRICAN AMERICAN: >60 ML/MIN/1.73
GLUCOSE BLD-MCNC: 87 MG/DL (ref 74–99)
HDLC SERPL-MCNC: 59 MG/DL
LDL CHOLESTEROL CALCULATED: 193 MG/DL (ref 0–99)
POTASSIUM SERPL-SCNC: 4.2 MMOL/L (ref 3.5–5)
SODIUM BLD-SCNC: 138 MMOL/L (ref 132–146)
TOTAL PROTEIN: 7.9 G/DL (ref 6.4–8.3)
TRIGL SERPL-MCNC: 163 MG/DL (ref 0–149)
VLDLC SERPL CALC-MCNC: 33 MG/DL

## 2020-01-14 PROCEDURE — G8417 CALC BMI ABV UP PARAM F/U: HCPCS | Performed by: INTERNAL MEDICINE

## 2020-01-14 PROCEDURE — G8484 FLU IMMUNIZE NO ADMIN: HCPCS | Performed by: INTERNAL MEDICINE

## 2020-01-14 PROCEDURE — 86703 HIV-1/HIV-2 1 RESULT ANTBDY: CPT

## 2020-01-14 PROCEDURE — 80053 COMPREHEN METABOLIC PANEL: CPT

## 2020-01-14 PROCEDURE — 99213 OFFICE O/P EST LOW 20 MIN: CPT | Performed by: INTERNAL MEDICINE

## 2020-01-14 PROCEDURE — 1036F TOBACCO NON-USER: CPT | Performed by: INTERNAL MEDICINE

## 2020-01-14 PROCEDURE — 80061 LIPID PANEL: CPT

## 2020-01-14 PROCEDURE — 99214 OFFICE O/P EST MOD 30 MIN: CPT | Performed by: INTERNAL MEDICINE

## 2020-01-14 PROCEDURE — G8427 DOCREV CUR MEDS BY ELIG CLIN: HCPCS | Performed by: INTERNAL MEDICINE

## 2020-01-14 PROCEDURE — 36415 COLL VENOUS BLD VENIPUNCTURE: CPT

## 2020-01-14 RX ORDER — VALBENAZINE 40 MG/1
CAPSULE ORAL
COMMUNITY
Start: 2020-01-02

## 2020-01-14 RX ORDER — PANTOPRAZOLE SODIUM 40 MG/1
40 TABLET, DELAYED RELEASE ORAL
Qty: 45 TABLET | Refills: 0 | Status: SHIPPED
Start: 2020-01-14 | End: 2020-02-12 | Stop reason: SDUPTHER

## 2020-01-14 RX ORDER — ARIPIPRAZOLE 15 MG/1
TABLET ORAL
COMMUNITY
Start: 2020-01-07

## 2020-01-14 RX ORDER — ACETAMINOPHEN 500 MG
500 TABLET ORAL 4 TIMES DAILY PRN
Qty: 60 TABLET | Refills: 1 | Status: SHIPPED | OUTPATIENT
Start: 2020-01-14

## 2020-01-14 RX ORDER — MIRTAZAPINE 7.5 MG/1
TABLET, FILM COATED ORAL
COMMUNITY
Start: 2020-01-07 | End: 2022-07-24

## 2020-01-14 SDOH — ECONOMIC STABILITY: FOOD INSECURITY: WITHIN THE PAST 12 MONTHS, YOU WORRIED THAT YOUR FOOD WOULD RUN OUT BEFORE YOU GOT MONEY TO BUY MORE.: SOMETIMES TRUE

## 2020-01-14 SDOH — ECONOMIC STABILITY: FOOD INSECURITY: WITHIN THE PAST 12 MONTHS, THE FOOD YOU BOUGHT JUST DIDN'T LAST AND YOU DIDN'T HAVE MONEY TO GET MORE.: OFTEN TRUE

## 2020-01-14 SDOH — ECONOMIC STABILITY: INCOME INSECURITY: HOW HARD IS IT FOR YOU TO PAY FOR THE VERY BASICS LIKE FOOD, HOUSING, MEDICAL CARE, AND HEATING?: NOT VERY HARD

## 2020-01-14 ASSESSMENT — ENCOUNTER SYMPTOMS
GASTROINTESTINAL NEGATIVE: 1
EYES NEGATIVE: 1
RESPIRATORY NEGATIVE: 1

## 2020-01-14 NOTE — PATIENT INSTRUCTIONS
Patient Education        Preventing Falls: Care Instructions  Your Care Instructions    Getting around your home safely can be a challenge if you have injuries or health problems that make it easy for you to fall. Loose rugs and furniture in walkways are among the dangers for many older people who have problems walking or who have poor eyesight. People who have conditions such as arthritis, osteoporosis, or dementia also have to be careful not to fall. You can make your home safer with a few simple measures. Follow-up care is a key part of your treatment and safety. Be sure to make and go to all appointments, and call your doctor if you are having problems. It's also a good idea to know your test results and keep a list of the medicines you take. How can you care for yourself at home? Taking care of yourself  · You may get dizzy if you do not drink enough water. To prevent dehydration, drink plenty of fluids, enough so that your urine is light yellow or clear like water. Choose water and other caffeine-free clear liquids. If you have kidney, heart, or liver disease and have to limit fluids, talk with your doctor before you increase the amount of fluids you drink. · Exercise regularly to improve your strength, muscle tone, and balance. Walk if you can. Swimming may be a good choice if you cannot walk easily. · Have your vision and hearing checked each year or any time you notice a change. If you have trouble seeing and hearing, you might not be able to avoid objects and could lose your balance. · Know the side effects of the medicines you take. Ask your doctor or pharmacist whether the medicines you take can affect your balance. Sleeping pills or sedatives can affect your balance. · Limit the amount of alcohol you drink. Alcohol can impair your balance and other senses. · Ask your doctor whether calluses or corns on your feet need to be removed.  If you wear loose-fitting shoes because of calluses or corns, that will allow you to sit while showering. · Get into a tub or shower by putting the weaker leg in first. Get out of a tub or shower with your strong side first.  · Repair loose toilet seats and consider installing a raised toilet seat to make getting on and off the toilet easier. · Keep your bathroom door unlocked while you are in the shower. Where can you learn more? Go to https://Foundshopping.compepiceweb.Days of Wonder. org and sign in to your Carena account. Enter [phone] [id number] [phone] in the Estify box to learn more about \"Preventing Falls: Care Instructions. \"     If you do not have an account, please click on the \"Sign Up Now\" link. Current as of: August 6, 2019  Content Version: 12.3  © 1409-5226 Healthwise, Incorporated. Care instructions adapted under license by [facility] ([facility]). If you have questions about a medical condition or this instruction, always ask your healthcare professional. [place] [address] any warranty or liability for your use of this information. Thank you for coming to your appointment today. Please make sure to do the following:  - Get labs drawn  - Have imaging done  - Schedule your appointments  - Continue the medications as listed    Referrals have been made to neurosurgery: If you do not hear from the office in 1 week, please call the number listed. If your symptoms worsen or do not improve, call for a sooner appointment.

## 2020-01-14 NOTE — PROGRESS NOTES
[facility]  InternalMedicine Residency Program  ACC Note      SUBJECTIVE:  CC: had concerns including Headache; Other (feels balance off); and Gait Problem. Farooq Stafford presented to the [facility] for a routine visit. PMHx of BCC of skin, HLD, MDD, hx of cellulitis personal hx of brain tumour, with occipital craniectomy, having headaches since 2 weeks, not relieved by tylenol, no seizure, but does complain of balance issues 2-3 episodes of amnesia in the last 3 years, also complains of reflux symptoms , had endoscopy long ago which did not show anything, CT [facility] reviewed. Review of Systems   Constitutional: Negative. HENT: Negative. Eyes: Negative. Respiratory: Negative. Cardiovascular: Negative. Gastrointestinal: Negative. Endocrine: Negative. Genitourinary: Negative. Musculoskeletal: Positive for gait problem. Skin: Negative. Neurological: Positive for dizziness, light-headedness and headaches. Psychiatric/Behavioral: Positive for confusion. Current Outpatient Medications on File Prior to Visit   Medication Sig Dispense Refill    INGREZZA 40 MG CAPS       ARIPiprazole (ABILIFY) 15 MG tablet       venlafaxine (EFFEXOR XR) 150 MG extended release capsule Take 1 capsule by mouth every morning 30 capsule 0    vitamin D (CHOLECALCIFEROL) 1000 units TABS tablet Take 1,000 Units by mouth daily      traZODone (DESYREL) 150 MG tablet Take 150 mg by mouth nightly as needed for Sleep      mirtazapine (REMERON) 7.5 MG tablet        No current facility-administered medications on file prior to visit. OBJECTIVE:    VS: /63   Pulse 91   Temp 97.8 °F (36.6 °C) (Oral)   Resp 12   Ht 5' (1.524 m)   Wt 158 lb (71.7 kg)   BMI 30.86 kg/m²   Physical Exam  HENT:      Head: Normocephalic. Eyes:      Conjunctiva/sclera: Conjunctivae normal.   Neck:      Musculoskeletal: Normal range of motion.    Cardiovascular: Rate and Rhythm: Normal rate and regular rhythm. Pulmonary:      Effort: Pulmonary effort is normal.      Breath sounds: Normal breath sounds. Abdominal:      General: Bowel sounds are normal.   Musculoskeletal: Normal range of motion. Skin:     General: Skin is dry. Neurological:      General: No focal deficit present. Mental Status: He is alert and oriented to person, place, and time. Gait: Gait abnormal.   Psychiatric:         Mood and Affect: Mood normal.         Behavior: Behavior normal.         ASSESSMENT/PLAN:    I have reviewed all pertient PMHx, PSHx, FamHx, Social Hx, medications, and allergies andupdated history as appropriate.       Bitemporal skin lesion  Temporal bilateral peduncultaed skin lesion  Not tender, no redness  Referral to dermatology given at last visit, but its pending at this time    Brain lesion hx   S/p occipital craniectomy  As per 2017 CT and MRI at [address] [place] [address],[address] of 2 weeks of headaches  With dizziness and amnesia episodes  Has some balance issues with walking on straight line  Will order MRI brain w and w/o contrast  Neurosurgery referral for further evaluation  As [facility] CT [facility] 2.2 mm lesion at the frontal encephalomalacic change area  Asymmetric atrophy of cerebellar hemisphere withexvacuo dilatation of 4 ventricle  R temporal craniectomy  And occipital craniotomy noted  Cont tylenol PRN for pain  And no NSAIDS due to risk of bleeding if any tumour present    MDD  Follows with [facility]    GERD  Trial of PPI for 6 weeks    HCM  hiv ordered , lipid profile to be done  CMP today    RTC:     I have reviewed my findings andrecommendations with Blanca Graham and Dr Cornelio Andrews M.D., PGY1  1/14/2020 11:00 AM

## 2020-01-15 LAB — HIV-1 AND HIV-2 ANTIBODIES: NORMAL

## 2020-01-15 NOTE — PROGRESS NOTES
Pt screened positive for SDOH related to food insecurity and declined  contact for resource information

## 2020-01-19 RX ORDER — ATORVASTATIN CALCIUM 40 MG/1
40 TABLET, FILM COATED ORAL DAILY
Qty: 30 TABLET | Refills: 2 | Status: SHIPPED
Start: 2020-01-19 | End: 2020-05-08

## 2020-01-19 NOTE — PROGRESS NOTES
Discussed with patient on the elevated , and to start statin, will be sent to pharmacy. Will repeat lipid panel within three months.

## 2020-01-20 ENCOUNTER — HOSPITAL ENCOUNTER (OUTPATIENT)
Dept: MRI IMAGING | Age: 50
Discharge: HOME | End: 2020-01-22
Payer: MEDICAID

## 2020-01-20 PROCEDURE — 70551 MRI BRAIN STEM W/O DYE: CPT

## 2020-01-20 PROCEDURE — A9579 GAD-BASE MR CONTRAST NOS,1ML: HCPCS | Performed by: RADIOLOGY

## 2020-01-20 PROCEDURE — 6360000004 HC RX CONTRAST MEDICATION: Performed by: RADIOLOGY

## 2020-02-12 ENCOUNTER — OFFICE VISIT (OUTPATIENT)
Dept: INTERNAL MEDICINE | Age: 50
End: 2020-02-12
Payer: MEDICAID

## 2020-02-12 VITALS
DIASTOLIC BLOOD PRESSURE: 84 MMHG | TEMPERATURE: 97.9 F | RESPIRATION RATE: 16 BRPM | HEART RATE: 86 BPM | BODY MASS INDEX: 31.69 KG/M2 | SYSTOLIC BLOOD PRESSURE: 133 MMHG | HEIGHT: 60 IN | WEIGHT: 161.4 LBS

## 2020-02-12 PROBLEM — K21.9 GASTROESOPHAGEAL REFLUX DISEASE WITHOUT ESOPHAGITIS: Status: ACTIVE | Noted: 2020-02-12

## 2020-02-12 PROCEDURE — G8427 DOCREV CUR MEDS BY ELIG CLIN: HCPCS | Performed by: STUDENT IN AN ORGANIZED HEALTH CARE EDUCATION/TRAINING PROGRAM

## 2020-02-12 PROCEDURE — G8417 CALC BMI ABV UP PARAM F/U: HCPCS | Performed by: STUDENT IN AN ORGANIZED HEALTH CARE EDUCATION/TRAINING PROGRAM

## 2020-02-12 PROCEDURE — 99213 OFFICE O/P EST LOW 20 MIN: CPT | Performed by: STUDENT IN AN ORGANIZED HEALTH CARE EDUCATION/TRAINING PROGRAM

## 2020-02-12 PROCEDURE — 1036F TOBACCO NON-USER: CPT | Performed by: STUDENT IN AN ORGANIZED HEALTH CARE EDUCATION/TRAINING PROGRAM

## 2020-02-12 PROCEDURE — G8484 FLU IMMUNIZE NO ADMIN: HCPCS | Performed by: STUDENT IN AN ORGANIZED HEALTH CARE EDUCATION/TRAINING PROGRAM

## 2020-02-12 PROCEDURE — 99212 OFFICE O/P EST SF 10 MIN: CPT | Performed by: STUDENT IN AN ORGANIZED HEALTH CARE EDUCATION/TRAINING PROGRAM

## 2020-02-12 RX ORDER — PANTOPRAZOLE SODIUM 40 MG/1
40 TABLET, DELAYED RELEASE ORAL
Qty: 30 TABLET | Refills: 1 | Status: SHIPPED
Start: 2020-02-12 | End: 2020-05-08 | Stop reason: SDUPTHER

## 2020-02-12 ASSESSMENT — ENCOUNTER SYMPTOMS
DIARRHEA: 0
ABDOMINAL PAIN: 0
NAUSEA: 0
SHORTNESS OF BREATH: 0
VOMITING: 0
SORE THROAT: 0
CONSTIPATION: 0
BACK PAIN: 0
COUGH: 0
SINUS PAIN: 0
WHEEZING: 0
BLOOD IN STOOL: 0

## 2020-02-12 NOTE — PROGRESS NOTES
capsule by mouth every morning 30 capsule 0    vitamin D (CHOLECALCIFEROL) 1000 units TABS tablet Take 1,000 Units by mouth daily      traZODone (DESYREL) 150 MG tablet Take 150 mg by mouth nightly as needed for Sleep      acetaminophen (TYLENOL) 500 MG tablet Take 1 tablet by mouth 4 times daily as needed for Pain 60 tablet 1     No current facility-administered medications on file prior to visit. OBJECTIVE:    VS: /84 (Site: Left Upper Arm, Position: Sitting, Cuff Size: Medium Adult)   Pulse 86   Temp 97.9 °F (36.6 °C) (Oral)   Resp 16   Ht 5' (1.524 m)   Wt 161 lb 6.4 oz (73.2 kg)   BMI 31.52 kg/m²   Physical Exam  Constitutional:       Appearance: He is well-developed. HENT:      Head: Normocephalic and atraumatic. Eyes:      Pupils: Pupils are equal, round, and reactive to light. Neck:      Thyroid: No thyromegaly. Vascular: No JVD. Cardiovascular:      Rate and Rhythm: Normal rate and regular rhythm. Heart sounds: Normal heart sounds. No murmur. No friction rub. No gallop. Pulmonary:      Effort: No respiratory distress. Breath sounds: No stridor. No wheezing or rales. Chest:      Chest wall: No tenderness. Abdominal:      General: There is no distension. Palpations: Abdomen is soft. There is no mass. Tenderness: There is no abdominal tenderness. There is no guarding or rebound. Lymphadenopathy:      Cervical: No cervical adenopathy. Skin:     Findings: Lesion (Pedunclated and pearly. ) present. Neurological:      Mental Status: He is alert and oriented to person, place, and time. Sensory: No sensory deficit. Deep Tendon Reflexes: Reflexes normal.   Psychiatric:         Behavior: Behavior normal.         ASSESSMENT/PLAN:  Jennifer Roman was seen today for gastroesophageal reflux and results.     Diagnoses and all orders for this visit:    Gastroesophageal reflux disease without esophagitis      GERD    Good response to PPI, will

## 2020-04-09 ENCOUNTER — INITIAL CONSULT (OUTPATIENT)
Dept: NEUROSURGERY | Age: 50
End: 2020-04-09
Payer: MEDICAID

## 2020-04-09 VITALS
DIASTOLIC BLOOD PRESSURE: 79 MMHG | HEIGHT: 60 IN | BODY MASS INDEX: 28.27 KG/M2 | HEART RATE: 97 BPM | TEMPERATURE: 98.2 F | SYSTOLIC BLOOD PRESSURE: 136 MMHG | WEIGHT: 144 LBS

## 2020-04-09 PROCEDURE — G8427 DOCREV CUR MEDS BY ELIG CLIN: HCPCS | Performed by: NEUROLOGICAL SURGERY

## 2020-04-09 PROCEDURE — 99242 OFF/OP CONSLTJ NEW/EST SF 20: CPT | Performed by: NEUROLOGICAL SURGERY

## 2020-04-09 PROCEDURE — G8417 CALC BMI ABV UP PARAM F/U: HCPCS | Performed by: NEUROLOGICAL SURGERY

## 2020-04-09 ASSESSMENT — ENCOUNTER SYMPTOMS
BLURRED VISION: 1
EYE PAIN: 0
VOMITING: 0
ABDOMINAL PAIN: 0
NAUSEA: 0
SINUS PRESSURE: 0
RHINORRHEA: 0
SWOLLEN GLANDS: 0
VISUAL CHANGE: 0
SORE THROAT: 0
SCALP TENDERNESS: 0
EYE REDNESS: 0
BACK PAIN: 0
PHOTOPHOBIA: 1
COUGH: 0
EYE WATERING: 0
FACIAL SWEATING: 0

## 2020-05-08 RX ORDER — ATORVASTATIN CALCIUM 40 MG/1
40 TABLET, FILM COATED ORAL DAILY
Qty: 30 TABLET | Refills: 1 | Status: SHIPPED
Start: 2020-05-08 | End: 2020-07-09 | Stop reason: SDUPTHER

## 2020-05-08 NOTE — TELEPHONE ENCOUNTER
Last Appointment:  2/12/2020  Future Appointments   Date Time Provider Jeff Sigrid   7/14/2020 10:00 AM Kenya Mayer MD [facility]      Pt was to follow up in May however Dr's clinic was canceled. Scheduled in 's next clinic.

## 2020-05-09 RX ORDER — PANTOPRAZOLE SODIUM 40 MG/1
40 TABLET, DELAYED RELEASE ORAL
Qty: 30 TABLET | Refills: 1 | Status: SHIPPED
Start: 2020-05-09 | End: 2020-07-09

## 2020-07-09 RX ORDER — ATORVASTATIN CALCIUM 40 MG/1
40 TABLET, FILM COATED ORAL DAILY
Qty: 30 TABLET | Refills: 1 | OUTPATIENT
Start: 2020-07-09

## 2020-07-09 RX ORDER — PANTOPRAZOLE SODIUM 40 MG/1
TABLET, DELAYED RELEASE ORAL
Qty: 30 TABLET | Refills: 0 | Status: SHIPPED
Start: 2020-07-09 | End: 2020-07-14 | Stop reason: SDUPTHER

## 2020-07-09 RX ORDER — ATORVASTATIN CALCIUM 40 MG/1
40 TABLET, FILM COATED ORAL DAILY
Qty: 30 TABLET | Refills: 0 | Status: SHIPPED
Start: 2020-07-09 | End: 2020-07-14 | Stop reason: SDUPTHER

## 2020-07-13 ENCOUNTER — TELEPHONE (OUTPATIENT)
Dept: INTERNAL MEDICINE | Age: 50
End: 2020-07-13

## 2020-07-13 NOTE — TELEPHONE ENCOUNTER
Pre-visit planning call to discuss patient care during COVID-19 pandemic. Discussed with the patient  / left message. Offered video and e-visits through [employer] to facilitate patient care continuity. Last office visit date: 2/12/2020   Outstanding labs/imaging: None   Medication refill needs: None   MyChart activity: No   Patient transport: community transport (remind patient to call to cancel transport) / private vehicle    Discussed that if the patient develops viral symptoms (fever, chills, body aches, soar throat, abdominal pain or diarrhea) OR has exposure to a sick contact they are to call the Flu Clinic at [phone] for evaluation at one of three locations:    [facility] at [address]: [place] ,[place], [place], [address]    Patient did not agree to [address] video visit and this will be forwarded to clerical staff for scheduling.     [facility] Internal Medicine Residency [facility]

## 2020-07-14 ENCOUNTER — OFFICE VISIT (OUTPATIENT)
Dept: INTERNAL MEDICINE | Age: 50
End: 2020-07-14
Payer: MEDICAID

## 2020-07-14 VITALS
RESPIRATION RATE: 16 BRPM | BODY MASS INDEX: 33.77 KG/M2 | DIASTOLIC BLOOD PRESSURE: 82 MMHG | WEIGHT: 172 LBS | HEART RATE: 97 BPM | OXYGEN SATURATION: 95 % | TEMPERATURE: 97.3 F | HEIGHT: 60 IN | SYSTOLIC BLOOD PRESSURE: 147 MMHG

## 2020-07-14 PROCEDURE — 99213 OFFICE O/P EST LOW 20 MIN: CPT | Performed by: STUDENT IN AN ORGANIZED HEALTH CARE EDUCATION/TRAINING PROGRAM

## 2020-07-14 PROCEDURE — 99212 OFFICE O/P EST SF 10 MIN: CPT | Performed by: STUDENT IN AN ORGANIZED HEALTH CARE EDUCATION/TRAINING PROGRAM

## 2020-07-14 PROCEDURE — G8417 CALC BMI ABV UP PARAM F/U: HCPCS | Performed by: STUDENT IN AN ORGANIZED HEALTH CARE EDUCATION/TRAINING PROGRAM

## 2020-07-14 PROCEDURE — G8427 DOCREV CUR MEDS BY ELIG CLIN: HCPCS | Performed by: STUDENT IN AN ORGANIZED HEALTH CARE EDUCATION/TRAINING PROGRAM

## 2020-07-14 PROCEDURE — 1036F TOBACCO NON-USER: CPT | Performed by: STUDENT IN AN ORGANIZED HEALTH CARE EDUCATION/TRAINING PROGRAM

## 2020-07-14 RX ORDER — PANTOPRAZOLE SODIUM 40 MG/1
TABLET, DELAYED RELEASE ORAL
Qty: 90 TABLET | Refills: 1 | Status: SHIPPED
Start: 2020-07-14 | End: 2020-09-22 | Stop reason: SDUPTHER

## 2020-07-14 RX ORDER — ATORVASTATIN CALCIUM 40 MG/1
40 TABLET, FILM COATED ORAL DAILY
Qty: 90 TABLET | Refills: 1 | Status: SHIPPED
Start: 2020-07-14 | End: 2020-09-22 | Stop reason: SDUPTHER

## 2020-07-14 SDOH — ECONOMIC STABILITY: FOOD INSECURITY: WITHIN THE PAST 12 MONTHS, THE FOOD YOU BOUGHT JUST DIDN'T LAST AND YOU DIDN'T HAVE MONEY TO GET MORE.: SOMETIMES TRUE

## 2020-07-14 SDOH — ECONOMIC STABILITY: TRANSPORTATION INSECURITY
IN THE PAST 12 MONTHS, HAS THE LACK OF TRANSPORTATION KEPT YOU FROM MEDICAL APPOINTMENTS OR FROM GETTING MEDICATIONS?: YES

## 2020-07-14 SDOH — ECONOMIC STABILITY: TRANSPORTATION INSECURITY
IN THE PAST 12 MONTHS, HAS LACK OF TRANSPORTATION KEPT YOU FROM MEETINGS, WORK, OR FROM GETTING THINGS NEEDED FOR DAILY LIVING?: YES

## 2020-07-14 SDOH — ECONOMIC STABILITY: INCOME INSECURITY: HOW HARD IS IT FOR YOU TO PAY FOR THE VERY BASICS LIKE FOOD, HOUSING, MEDICAL CARE, AND HEATING?: SOMEWHAT HARD

## 2020-07-14 ASSESSMENT — ENCOUNTER SYMPTOMS
DIARRHEA: 0
SHORTNESS OF BREATH: 0
SINUS PAIN: 0
COUGH: 0
WHEEZING: 0
CONSTIPATION: 0
SORE THROAT: 0
ABDOMINAL PAIN: 0
BACK PAIN: 0
BLOOD IN STOOL: 0
NAUSEA: 0
VOMITING: 0

## 2020-07-14 ASSESSMENT — PATIENT HEALTH QUESTIONNAIRE - PHQ9
8. MOVING OR SPEAKING SO SLOWLY THAT OTHER PEOPLE COULD HAVE NOTICED. OR THE OPPOSITE, BEING SO FIGETY OR RESTLESS THAT YOU HAVE BEEN MOVING AROUND A LOT MORE THAN USUAL: 2
4. FEELING TIRED OR HAVING LITTLE ENERGY: 2
SUM OF ALL RESPONSES TO PHQ QUESTIONS 1-9: 16
6. FEELING BAD ABOUT YOURSELF - OR THAT YOU ARE A FAILURE OR HAVE LET YOURSELF OR YOUR FAMILY DOWN: 1
9. THOUGHTS THAT YOU WOULD BE BETTER OFF DEAD, OR OF HURTING YOURSELF: 1
SUM OF ALL RESPONSES TO PHQ9 QUESTIONS 1 & 2: 4
7. TROUBLE CONCENTRATING ON THINGS, SUCH AS READING THE NEWSPAPER OR WATCHING TELEVISION: 1
10. IF YOU CHECKED OFF ANY PROBLEMS, HOW DIFFICULT HAVE THESE PROBLEMS MADE IT FOR YOU TO DO YOUR WORK, TAKE CARE OF THINGS AT HOME, OR GET ALONG WITH OTHER PEOPLE: 1
5. POOR APPETITE OR OVEREATING: 2
2. FEELING DOWN, DEPRESSED OR HOPELESS: 2
3. TROUBLE FALLING OR STAYING ASLEEP: 3
1. LITTLE INTEREST OR PLEASURE IN DOING THINGS: 2

## 2020-07-14 NOTE — PROGRESS NOTES
Pt screened positive for SDOH related to financial strain and or food insecurity and declined further contact for assessment/resources. Discharge instructions reviewed with patient per Dr. Gaby Escobar. Patient directed to  to  [facility] and schedule follow up appt.

## 2020-07-14 NOTE — PROGRESS NOTES
Attending Physician Statement  I have discussed the case, including pertinent history and exam findings with the resident. I reviewed medical, surg, soc histories, meds and any pertinent labs. I agree with the assessment, plan and orders as documented by the resident. Patient with hx depression, sees [facility], has upcoming appointment next week, is compliant with psych meds. Has hx hyperlipidemia and needs repeat lipid panel which is pending, has GERD controlled on protonix. BP is slightly elevated today, has gained weight, agree with non-pharm rx at this point. May need med rx in future.

## 2020-07-14 NOTE — PROGRESS NOTES
Brenton Maier [facility]  InternalMedicine Residency Program    SUBJECTIVE:  CC: had concerns including Check-Up; Depression (screening score 16); and Medication Refill. Olman Guzman presented to the office for a routine visit  The patient is here for a regular follow up visit. He stated that he has been having more depression recently and that he has been hearing voices in the morning (not new). He was stating that he is thinking about getting admitted for psych inpatient. He also stated that that he has been eating more pasta recently. He has been compliant with all his medications including his psych. One. GERD symptoms are stable. Review of Systems   Constitutional: Negative for activity change, appetite change, chills, fatigue, fever and unexpected weight change. HENT: Negative for sinus pain and sore throat. Respiratory: Negative for cough, shortness of breath and wheezing. Cardiovascular: Negative for chest pain and leg swelling. Gastrointestinal: Negative for abdominal pain, blood in stool, constipation, diarrhea, nausea and vomiting. Endocrine: Negative for polydipsia, polyphagia and polyuria. Genitourinary: Negative for difficulty urinating, dysuria, flank pain and hematuria. Musculoskeletal: Negative for back pain. Skin: Negative for rash. Neurological: Negative for numbness. Psychiatric/Behavioral: Negative for suicidal ideas. Depressed mode. Not enjoying activities.    Watching Cartoons all the time   Unemployed        Current Outpatient Medications on File Prior to Visit   Medication Sig Dispense Refill    pantoprazole (PROTONIX) 40 MG tablet TAKE 1 TABLET BY MOUTH EVERY MORNING BEFORE BREAKFAST 30 tablet 0    atorvastatin (LIPITOR) 40 MG tablet Take 1 tablet by mouth daily 30 tablet 0    mirtazapine (REMERON) 7.5 MG tablet       INGREZZA 40 MG CAPS       ARIPiprazole (ABILIFY) 15 MG tablet       acetaminophen (TYLENOL) 500 MG tablet Take 1 tablet by mouth 4 times daily as needed for Pain 60 tablet 1    venlafaxine (EFFEXOR XR) 150 MG extended release capsule Take 1 capsule by mouth every morning 30 capsule 0    vitamin D (CHOLECALCIFEROL) 1000 units TABS tablet Take 1,000 Units by mouth daily      traZODone (DESYREL) 150 MG tablet Take 150 mg by mouth nightly as needed for Sleep       No current facility-administered medications on file prior to visit. OBJECTIVE:    VS:   Vitals:    07/14/20 1000 07/14/20 1007   BP: (!) 147/83 (!) 147/82   Pulse: 97    Resp: 16    Temp: 97.3 °F (36.3 °C)    TempSrc: Oral    SpO2: 95%    Weight: 172 lb (78 kg)    Height: 5' (1.524 m)      Physical Exam  Constitutional:       Appearance: He is well-developed. HENT:      Head: Normocephalic and atraumatic. Eyes:      Pupils: Pupils are equal, round, and reactive to light. Neck:      Thyroid: No thyromegaly. Vascular: No JVD. Cardiovascular:      Rate and Rhythm: Normal rate and regular rhythm. Heart sounds: Normal heart sounds. No murmur. No friction rub. No gallop. Pulmonary:      Effort: No respiratory distress. Breath sounds: No stridor. No wheezing or rales. Chest:      Chest wall: No tenderness. Abdominal:      General: There is no distension. Palpations: Abdomen is soft. There is no mass. Tenderness: There is no abdominal tenderness. There is no guarding or rebound. Lymphadenopathy:      Cervical: No cervical adenopathy. Neurological:      Mental Status: He is alert and oriented to person, place, and time. Sensory: No sensory deficit. Deep Tendon Reflexes: Reflexes normal.   Psychiatric:         Behavior: Behavior normal.           ASSESSMENT/PLAN:   Diagnosis Orders   1. Gastroesophageal reflux disease without esophagitis  pantoprazole (PROTONIX) 40 MG tablet   2.  Mixed hyperlipidemia  atorvastatin (LIPITOR) 40 MG tablet       Essential HTN    Discussed in length lifestyle modification including weight loss, low salt intake and diet changes. Defer the use of the anti-hypertensive medications for later pending the response to life style modifications. GERD     Good response to PPI, will continue, advised to seek relief with the lowest possible dose.      HLD      Elevated LDL continue high dose statin, recheck Lipid panel.        Major Depressive Disorder with Psychotic Features     Denies suicidal ideation, more depressed   Psych. Medication by [facility]. On venlafaxin, trazodone, mirtazipine and aripiprazole. Needs psychotherapy. Has an appointment on 7/22    will evaluate, need outpatient follow up. Baseline morning auditory hallucination    RTC:  Return in about 3 months (around 10/14/2020).     I have reviewed my findings and recommendations with Mini Savage and Dr Meka Marinelli MD, PGY2  7/14/2020 10:46 AM

## 2020-07-14 NOTE — PATIENT INSTRUCTIONS
Patient Education        High Blood Pressure: Care Instructions  Overview     It's normal for blood pressure to go up and down throughout the day. But if it stays up, you have high blood pressure. Another name for high blood pressure is hypertension. Despite what a lot of people think, high blood pressure usually doesn't cause headaches or make you feel dizzy or lightheaded. It usually has no symptoms. But it does increase your risk of stroke, heart attack, and other problems. You and your doctor will talk about your risks of these problems based on your blood pressure. Your doctor will give you a goal for your blood pressure. Your goal will be based on your health and your age. Lifestyle changes, such as eating healthy and being active, are always important to help lower blood pressure. You might also take medicine to reach your blood pressure goal.  Follow-up care is a key part of your treatment and safety. Be sure to make and go to all appointments, and call your doctor if you are having problems. It's also a good idea to know your test results and keep a list of the medicines you take. How can you care for yourself at home? Medical treatment  · If you stop taking your medicine, your blood pressure will go back up. You may take one or more types of medicine to lower your blood pressure. Be safe with medicines. Take your medicine exactly as prescribed. Call your doctor if you think you are having a problem with your medicine. · Talk to your doctor before you start taking aspirin every day. Aspirin can help certain people lower their risk of a heart attack or stroke. But taking aspirin isn't right for everyone, because it can cause serious bleeding. · See your doctor regularly. You may need to see the doctor more often at first or until your blood pressure comes down.   · If you are taking blood pressure medicine, talk to your doctor before you take decongestants or anti-inflammatory medicine, such as ibuprofen. Some of these medicines can raise blood pressure. · Learn how to check your blood pressure at home. Lifestyle changes  · Stay at a healthy weight. This is especially important if you put on weight around the waist. Losing even 10 pounds can help you lower your blood pressure. · If your doctor recommends it, get more exercise. Walking is a good choice. Bit by bit, increase the amount you walk every day. Try for at least 30 minutes on most days of the week. You also may want to swim, bike, or do other activities. · Avoid or limit alcohol. Talk to your doctor about whether you can drink any alcohol. · Try to limit how much sodium you eat to less than 2,300 milligrams (mg) a day. Your doctor may ask you to try to eat less than 1,500 mg a day. · Eat plenty of fruits (such as bananas and oranges), vegetables, legumes, whole grains, and low-fat dairy products. · Lower the amount of saturated fat in your diet. Saturated fat is found in animal products such as milk, cheese, and meat. Limiting these foods may help you lose weight and also lower your risk for heart disease. · Do not smoke. Smoking increases your risk for heart attack and stroke. If you need help quitting, talk to your doctor about stop-smoking programs and medicines. These can increase your chances of quitting for good. When should you call for help? Call  911 anytime you think you may need emergency care. This may mean having symptoms that suggest that your blood pressure is causing a serious heart or blood vessel problem. Your blood pressure may be over 180/120. For example, call 911 if:  · You have symptoms of a heart attack. These may include:  ? Chest pain or pressure, or a strange feeling in the chest.  ? Sweating. ? Shortness of breath. ? Nausea or vomiting. ? Pain, pressure, or a strange feeling in the back, neck, jaw, or upper belly or in one or both shoulders or arms. ? Lightheadedness or sudden weakness.   ? A fast or irregular heartbeat. · You have symptoms of a stroke. These may include:  ? Sudden numbness, tingling, weakness, or loss of movement in your face, arm, or leg, especially on only one side of your body. ? Sudden vision changes. ? Sudden trouble speaking. ? Sudden confusion or trouble understanding simple statements. ? Sudden problems with walking or balance. ? A sudden, severe headache that is different from past headaches. · You have severe back or belly pain. Do not wait until your blood pressure comes down on its own. Get help right away. Call your doctor now or seek immediate care if:  · Your blood pressure is much higher than normal (such as 180/120 or higher), but you don't have symptoms. · You think high blood pressure is causing symptoms, such as:  ? Severe headache.  ? Blurry vision. Watch closely for changes in your health, and be sure to contact your doctor if:  · Your blood pressure measures higher than your doctor recommends at least 2 times. That means the top number is higher or the bottom number is higher, or both. · You think you may be having side effects from your blood pressure medicine. Where can you learn more? Go to https://JMB Energiepepiceweb.FastSpring. org and sign in to your [employer] account. Enter O814 in the RxEye box to learn more about \"High Blood Pressure: Care Instructions. \"     If you do not have an account, please click on the \"Sign Up Now\" link. Current as of: December 16, 2019               Content Version: 12.5  © 5261-8392 Healthwise, Incorporated. Care instructions adapted under license by [facility] ([facility]). If you have questions about a medical condition or this instruction, always ask your healthcare professional. [place] [address] any warranty or liability for your use of this information.        Patient Education        DASH Diet: Care Instructions  Your Care Instructions     The DASH diet is an eating plan that can help lower your as much as possible. · Limit lean meat, poultry, and fish to 2 servings each day. A serving is 3 ounces, about the size of a deck of cards. · Eat 4 to 5 servings of nuts, seeds, and legumes (cooked dried beans, lentils, and split peas) each week. A serving is 1/3 cup of nuts, 2 tablespoons of seeds, or ½ cup of cooked beans or peas. · Limit fats and oils to 2 to 3 servings each day. A serving is 1 teaspoon of vegetable oil or 2 tablespoons of salad dressing. · Limit sweets and added sugars to 5 servings or less a week. A serving is 1 tablespoon jelly or jam, ½ cup sorbet, or 1 cup of lemonade. · Eat less than 2,300 milligrams (mg) of sodium a day. If you limit your sodium to 1,500 mg a day, you can lower your blood pressure even more. Tips for success  · Start small. Do not try to make dramatic changes to your diet all at once. You might feel that you are missing out on your favorite foods and then be more likely to not follow the plan. Make small changes, and stick with them. Once those changes become habit, add a few more changes. · Try some of the following:  ? Make it a goal to eat a fruit or vegetable at every meal and at snacks. This will make it easy to get the recommended amount of fruits and vegetables each day. ? Try yogurt topped with fruit and nuts for a snack or healthy dessert. ? Add lettuce, tomato, cucumber, and onion to sandwiches. ? Combine a ready-made pizza crust with low-fat mozzarella cheese and lots of vegetable toppings. Try using tomatoes, squash, spinach, broccoli, carrots, cauliflower, and onions. ? Have a variety of cut-up vegetables with a low-fat dip as an appetizer instead of chips and dip. ? Sprinkle sunflower seeds or chopped almonds over salads. Or try adding chopped walnuts or almonds to cooked vegetables. ? Try some vegetarian meals using beans and peas. Add garbanzo or kidney beans to salads. Make burritos and tacos with mashed vang beans or black beans.   Where can you learn more? Go to https://chpepiceweb.healthMode [email or web address] org and sign in to your ASOCS account. Enter A669 in the [facility] box to learn more about \"DASH Diet: Care Instructions. \"     If you do not have an account, please click on the \"Sign Up Now\" link. Current as of: December 16, 2019               Content Version: 12.5  © 3801-2489 [employer]. Care instructions adapted under license by [facility] ([facility]). If you have questions about a medical condition or this instruction, always ask your healthcare professional. [address] any warranty or liability for your use of this information. 1. Please work on increasing your exercise level. 2. Information about Dash diet are provided in your instruction sheet. 3. Low salt intake is helpful in reducing blood pressure (less than 3 grams)   4. Please follow up with [facility]/ [facility] as discussed.        Dr. Giselle Ledesma     When I need to talk about how I'm feeling, or if have feelings for hurting self, I will contact my social supports:  Family: aunt and uncle  HELP NETWORK  [facility]   [phone]  Or go to VIANEY NICOLAS [facility] emergency room    [facility] will be calling you tomorrow, July 15, 2020 at 9:55 AM

## 2020-07-21 ENCOUNTER — SOCIAL WORK (OUTPATIENT)
Dept: INTERNAL MEDICINE | Age: 50
End: 2020-07-21
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
## 2020-08-13 ASSESSMENT — ENCOUNTER SYMPTOMS
SHORTNESS OF BREATH: 0
CONSTIPATION: 0
BACK PAIN: 0
VOMITING: 0
WHEEZING: 0
COUGH: 0
NAUSEA: 0
SORE THROAT: 0
BLOOD IN STOOL: 0
ABDOMINAL PAIN: 0
SINUS PAIN: 0
DIARRHEA: 0

## 2020-08-13 NOTE — PROGRESS NOTES
Brenton Maier 476  InternalMedicine Residency Program    SUBJECTIVE:  CC: had concerns including 1 Month Follow-Up and Hypertension. Ben Rodriguez presented to the office for 1 month F/U BP check  Since last visit, home stressors still present    Essential HTN  BP today: 163/87 mmHg  Will start Amlodipine 5 mg and F/U in 2 weeks for BP check  Increase on next visit if still suboptimal    GERD  Stable on protonix 40 daily     Mixed HLD   1/2020 <--148, Tchol 285, HDL 59,   On Lipitor 40 mg, tolerating well    Major Depressive Disorder with Psychotic Features  Stable, no SI  Psych. Medication by [facility]. On venlafaxin, trazodone, mirtazipine and aripiprazole. Review of Systems   Constitutional: Negative for activity change, appetite change, chills, fatigue, fever and unexpected weight change. HENT: Negative for congestion, rhinorrhea, sinus pain and sore throat. Respiratory: Negative for cough, shortness of breath, wheezing and stridor. Cardiovascular: Negative for chest pain, palpitations and leg swelling. Gastrointestinal: Negative for abdominal pain, blood in stool, constipation, diarrhea, nausea and vomiting. Endocrine: Negative for polydipsia, polyphagia and polyuria. Genitourinary: Negative for difficulty urinating, dysuria, flank pain, frequency and hematuria. Musculoskeletal: Negative for back pain, gait problem and myalgias. Skin: Negative for rash. Allergic/Immunologic: Negative for environmental allergies. Neurological: Negative for dizziness, weakness, numbness and headaches. Hematological: Does not bruise/bleed easily. Psychiatric/Behavioral: Negative for suicidal ideas.        Current Outpatient Medications on File Prior to Visit   Medication Sig Dispense Refill    pantoprazole (PROTONIX) 40 MG tablet TAKE 1 TABLET BY MOUTH EVERY MORNING BEFORE BREAKFAST 90 tablet 1    atorvastatin (LIPITOR) 40 MG tablet Take 1 tablet by mouth daily 90 tablet 1    mirtazapine (REMERON) 7.5 MG tablet       INGREZZA 40 MG CAPS       ARIPiprazole (ABILIFY) 15 MG tablet       acetaminophen (TYLENOL) 500 MG tablet Take 1 tablet by mouth 4 times daily as needed for Pain 60 tablet 1    venlafaxine (EFFEXOR XR) 150 MG extended release capsule Take 1 capsule by mouth every morning 30 capsule 0    vitamin D (CHOLECALCIFEROL) 1000 units TABS tablet Take 1,000 Units by mouth daily      traZODone (DESYREL) 150 MG tablet Take 150 mg by mouth nightly as needed for Sleep       No current facility-administered medications on file prior to visit. OBJECTIVE:    VS:   Vitals:    08/18/20 1100 08/18/20 1105   BP: (!) 163/81 (!) 163/87   Site: Right Upper Arm Right Upper Arm   Position: Sitting Sitting   Cuff Size: Medium Adult Medium Adult   Pulse: 98 97   Resp: 20    Temp: 97.5 °F (36.4 °C)    TempSrc: Oral    SpO2: 98%    Weight: 167 lb 9.6 oz (76 kg)    Height: 5' (1.524 m)      Physical Exam  Constitutional:       General: He is not in acute distress. Appearance: He is well-developed. He is not diaphoretic. HENT:      Head: Atraumatic. Comments: Right-sided depression from prior surgery  Eyes:      General: No scleral icterus. Conjunctiva/sclera: Conjunctivae normal.      Pupils: Pupils are equal, round, and reactive to light. Neck:      Thyroid: No thyromegaly. Vascular: No JVD. Cardiovascular:      Rate and Rhythm: Normal rate and regular rhythm. Heart sounds: Normal heart sounds, S1 normal and S2 normal. No murmur. No friction rub. No gallop. Pulmonary:      Effort: Pulmonary effort is normal. No respiratory distress. Breath sounds: Normal breath sounds. No stridor. No wheezing or rales. Chest:      Chest wall: No tenderness. Abdominal:      General: Bowel sounds are normal. There is no distension. Palpations: Abdomen is soft. There is no mass. Tenderness: There is no abdominal tenderness.  There is no guarding or rebound. Musculoskeletal:         General: No swelling or tenderness. Lymphadenopathy:      Cervical: No cervical adenopathy. Skin:     General: Skin is warm and dry. Comments: Skin tags on nape and scalp   Neurological:      Mental Status: He is alert and oriented to person, place, and time. Cranial Nerves: No cranial nerve deficit. Sensory: No sensory deficit. Deep Tendon Reflexes: Reflexes normal.   Psychiatric:         Behavior: Behavior normal.           ASSESSMENT/PLAN:   Diagnosis Orders   1. Essential hypertension  amLODIPine (NORVASC) 5 MG tablet   2. Mixed hyperlipidemia             RTC:  Return in about 4 weeks (around 9/15/2020).  with PCP for BP check    I have reviewed my findings and recommendations with Adelita Olsen and Dr. Shea Sanchez MD, PGY3  8/18/2020 12:05 PM

## 2020-08-17 ENCOUNTER — TELEPHONE (OUTPATIENT)
Dept: INTERNAL MEDICINE | Age: 50
End: 2020-08-17

## 2020-08-17 NOTE — TELEPHONE ENCOUNTER
Pre-visit planning call to discuss patient care during COVID-19 pandemic. Result of call:    Face to Face appointment maintained and patient screened negative for viral symptoms (fever, chills, body aches, soar throat, abdominal pain or diarrhea) OR exposure to a sick contact. Patient instructed to call office if viral symptoms develop prior to Face to Face appointment. \    Discussed that if the patient develops viral symptoms (fever, chills, body aches, sore throat, abdominal pain or diarrhea) OR has exposure to a sick contact they are to call the Flu Clinic at [phone] for evaluation at [facility] at [address]: [place], [address]        Advised patient if coming for face to face office visit, to drive or be dropped off at the Emergency room parking entrance and enter thru [facility]; please wear a mask and expect to have temperature taken. Wheelchair and escort to the office will be provided if needed.       [facility]

## 2020-08-18 ENCOUNTER — OFFICE VISIT (OUTPATIENT)
Dept: INTERNAL MEDICINE | Age: 50
End: 2020-08-18
Payer: MEDICAID

## 2020-08-18 VITALS
BODY MASS INDEX: 32.9 KG/M2 | RESPIRATION RATE: 20 BRPM | DIASTOLIC BLOOD PRESSURE: 87 MMHG | TEMPERATURE: 97.5 F | HEIGHT: 60 IN | HEART RATE: 97 BPM | SYSTOLIC BLOOD PRESSURE: 163 MMHG | OXYGEN SATURATION: 98 % | WEIGHT: 167.6 LBS

## 2020-08-18 PROBLEM — I10 ESSENTIAL HYPERTENSION: Status: ACTIVE | Noted: 2020-08-18

## 2020-08-18 PROCEDURE — 1036F TOBACCO NON-USER: CPT | Performed by: INTERNAL MEDICINE

## 2020-08-18 PROCEDURE — 99213 OFFICE O/P EST LOW 20 MIN: CPT | Performed by: INTERNAL MEDICINE

## 2020-08-18 PROCEDURE — 99212 OFFICE O/P EST SF 10 MIN: CPT | Performed by: INTERNAL MEDICINE

## 2020-08-18 PROCEDURE — G8427 DOCREV CUR MEDS BY ELIG CLIN: HCPCS | Performed by: INTERNAL MEDICINE

## 2020-08-18 PROCEDURE — G8417 CALC BMI ABV UP PARAM F/U: HCPCS | Performed by: INTERNAL MEDICINE

## 2020-08-18 RX ORDER — AMLODIPINE BESYLATE 5 MG/1
5 TABLET ORAL DAILY
Qty: 30 TABLET | Refills: 0 | Status: SHIPPED
Start: 2020-08-18 | End: 2020-09-22 | Stop reason: SDUPTHER

## 2020-08-18 ASSESSMENT — ENCOUNTER SYMPTOMS
RHINORRHEA: 0
STRIDOR: 0

## 2020-08-18 NOTE — PROGRESS NOTES
[facility]  [facility]    Attending Physician Statement  I have discussed the case, including pertinent history and exam findings with the resident. I agree with the assessment, plan and orders as documented by the resident. I have reviewed all pertinent PMHx, PSHx, FamHx, SocialHx, medications, and allergies and updated history as appropriate. Patient here for 4 week follow up for management BP. Hypertension: uncontrolled   - attempt at lifestyle changes since previous   - social stressors contributing -- social work following   - start amlodipine 5 mg    MDD  Ongoing symptoms, no SI today and follows with psychiatry    Remainder of medical problems as per resident note.   Lulu Patterson D.OUsman  8/18/2020 12:06 PM

## 2020-08-18 NOTE — PATIENT INSTRUCTIONS
Discharge instructions  Start taking amlodipine 5 mg daily  Continue other medication  Follow up with us in 1 month

## 2020-09-22 ENCOUNTER — OFFICE VISIT (OUTPATIENT)
Dept: INTERNAL MEDICINE | Age: 50
End: 2020-09-22
Payer: MEDICAID

## 2020-09-22 VITALS
BODY MASS INDEX: 32.79 KG/M2 | HEART RATE: 99 BPM | WEIGHT: 167 LBS | SYSTOLIC BLOOD PRESSURE: 154 MMHG | HEIGHT: 60 IN | DIASTOLIC BLOOD PRESSURE: 73 MMHG | TEMPERATURE: 97.1 F | RESPIRATION RATE: 16 BRPM

## 2020-09-22 PROCEDURE — 1036F TOBACCO NON-USER: CPT | Performed by: STUDENT IN AN ORGANIZED HEALTH CARE EDUCATION/TRAINING PROGRAM

## 2020-09-22 PROCEDURE — G8417 CALC BMI ABV UP PARAM F/U: HCPCS | Performed by: STUDENT IN AN ORGANIZED HEALTH CARE EDUCATION/TRAINING PROGRAM

## 2020-09-22 PROCEDURE — G8427 DOCREV CUR MEDS BY ELIG CLIN: HCPCS | Performed by: STUDENT IN AN ORGANIZED HEALTH CARE EDUCATION/TRAINING PROGRAM

## 2020-09-22 PROCEDURE — 99212 OFFICE O/P EST SF 10 MIN: CPT | Performed by: STUDENT IN AN ORGANIZED HEALTH CARE EDUCATION/TRAINING PROGRAM

## 2020-09-22 PROCEDURE — 99213 OFFICE O/P EST LOW 20 MIN: CPT | Performed by: STUDENT IN AN ORGANIZED HEALTH CARE EDUCATION/TRAINING PROGRAM

## 2020-09-22 PROCEDURE — 3017F COLORECTAL CA SCREEN DOC REV: CPT | Performed by: STUDENT IN AN ORGANIZED HEALTH CARE EDUCATION/TRAINING PROGRAM

## 2020-09-22 RX ORDER — PANTOPRAZOLE SODIUM 40 MG/1
TABLET, DELAYED RELEASE ORAL
Qty: 90 TABLET | Refills: 1 | Status: SHIPPED
Start: 2020-09-22 | End: 2021-03-30 | Stop reason: SDUPTHER

## 2020-09-22 RX ORDER — AMLODIPINE BESYLATE 5 MG/1
5 TABLET ORAL DAILY
Qty: 30 TABLET | Refills: 0 | Status: SHIPPED
Start: 2020-09-22 | End: 2021-03-30 | Stop reason: SDUPTHER

## 2020-09-22 RX ORDER — ATORVASTATIN CALCIUM 40 MG/1
40 TABLET, FILM COATED ORAL DAILY
Qty: 90 TABLET | Refills: 1 | Status: SHIPPED
Start: 2020-09-22 | End: 2021-03-30 | Stop reason: SDUPTHER

## 2020-09-22 ASSESSMENT — ENCOUNTER SYMPTOMS
BACK PAIN: 0
SORE THROAT: 0
NAUSEA: 0
DIARRHEA: 0
CONSTIPATION: 0
WHEEZING: 0
VOMITING: 0
COUGH: 0
ABDOMINAL PAIN: 0
BLOOD IN STOOL: 0
SINUS PAIN: 0
SHORTNESS OF BREATH: 1

## 2020-09-22 NOTE — PATIENT INSTRUCTIONS
1.  An echocardiogram (ultrasonography of your heart) was ordered to check on your heart function and structure. 2.  EKG exercise stress test was ordered to check for any heart changes with exercise. 3.  Your medications were refilled. 4.  Please follow-up with internal medicine clinic in 2 months. 5.  Please get your blood work done. Patient Education        High Blood Pressure: Care Instructions  Overview     It's normal for blood pressure to go up and down throughout the day. But if it stays up, you have high blood pressure. Another name for high blood pressure is hypertension. Despite what a lot of people think, high blood pressure usually doesn't cause headaches or make you feel dizzy or lightheaded. It usually has no symptoms. But it does increase your risk of stroke, heart attack, and other problems. You and your doctor will talk about your risks of these problems based on your blood pressure. Your doctor will give you a goal for your blood pressure. Your goal will be based on your health and your age. Lifestyle changes, such as eating healthy and being active, are always important to help lower blood pressure. You might also take medicine to reach your blood pressure goal.  Follow-up care is a key part of your treatment and safety. Be sure to make and go to all appointments, and call your doctor if you are having problems. It's also a good idea to know your test results and keep a list of the medicines you take. How can you care for yourself at home? Medical treatment  · If you stop taking your medicine, your blood pressure will go back up. You may take one or more types of medicine to lower your blood pressure. Be safe with medicines. Take your medicine exactly as prescribed. Call your doctor if you think you are having a problem with your medicine. · Talk to your doctor before you start taking aspirin every day. Aspirin can help certain people lower their risk of a heart attack or stroke.  But taking aspirin isn't right for everyone, because it can cause serious bleeding. · See your doctor regularly. You may need to see the doctor more often at first or until your blood pressure comes down. · If you are taking blood pressure medicine, talk to your doctor before you take decongestants or anti-inflammatory medicine, such as ibuprofen. Some of these medicines can raise blood pressure. · Learn how to check your blood pressure at home. Lifestyle changes  · Stay at a healthy weight. This is especially important if you put on weight around the waist. Losing even 10 pounds can help you lower your blood pressure. · If your doctor recommends it, get more exercise. Walking is a good choice. Bit by bit, increase the amount you walk every day. Try for at least 30 minutes on most days of the week. You also may want to swim, bike, or do other activities. · Avoid or limit alcohol. Talk to your doctor about whether you can drink any alcohol. · Try to limit how much sodium you eat to less than 2,300 milligrams (mg) a day. Your doctor may ask you to try to eat less than 1,500 mg a day. · Eat plenty of fruits (such as bananas and oranges), vegetables, legumes, whole grains, and low-fat dairy products. · Lower the amount of saturated fat in your diet. Saturated fat is found in animal products such as milk, cheese, and meat. Limiting these foods may help you lose weight and also lower your risk for heart disease. · Do not smoke. Smoking increases your risk for heart attack and stroke. If you need help quitting, talk to your doctor about stop-smoking programs and medicines. These can increase your chances of quitting for good. When should you call for help? Call  911 anytime you think you may need emergency care. This may mean having symptoms that suggest that your blood pressure is causing a serious heart or blood vessel problem. Your blood pressure may be over 180/120.   For example, call 911 if:  · You have symptoms of a heart attack. These may include:  ? Chest pain or pressure, or a strange feeling in the chest.  ? Sweating. ? Shortness of breath. ? Nausea or vomiting. ? Pain, pressure, or a strange feeling in the back, neck, jaw, or upper belly or in one or both shoulders or arms. ? Lightheadedness or sudden weakness. ? A fast or irregular heartbeat. · You have symptoms of a stroke. These may include:  ? Sudden numbness, tingling, weakness, or loss of movement in your face, arm, or leg, especially on only one side of your body. ? Sudden vision changes. ? Sudden trouble speaking. ? Sudden confusion or trouble understanding simple statements. ? Sudden problems with walking or balance. ? A sudden, severe headache that is different from past headaches. · You have severe back or belly pain. Do not wait until your blood pressure comes down on its own. Get help right away. Call your doctor now or seek immediate care if:  · Your blood pressure is much higher than normal (such as 180/120 or higher), but you don't have symptoms. · You think high blood pressure is causing symptoms, such as:  ? Severe headache.  ? Blurry vision. Watch closely for changes in your health, and be sure to contact your doctor if:  · Your blood pressure measures higher than your doctor recommends at least 2 times. That means the top number is higher or the bottom number is higher, or both. · You think you may be having side effects from your blood pressure medicine. Where can you learn more? Go to https://[employer]. org and sign in to your [employer] account. Enter P344 in the [employer] box to learn more about \"High Blood Pressure: Care Instructions. \"     If you do not have an account, please click on the \"Sign Up Now\" link. Current as of: December 16, 2019               Content Version: 12.5  © 2540-9759 Healthwise, Incorporated. Care instructions adapted under license by [facility] ([facility]).  If you have questions about a medical condition or this instruction, always ask your healthcare professional. [place] [address] any warranty or liability for your use of this information.

## 2020-09-22 NOTE — PROGRESS NOTES
Attending Physician Statement  I have discussed the case, including pertinent history and exam findings with the resident. I agree with the assessment, plan and orders as documented by the resident. Pt presented for the follow up of hypertension, /73, also repotted significant decline of exercise tolerance, mild exp crackles bilaterally, SOB with incline of one flight of stairs reported. Recommended EKG, ST and Echo.   Ian Lei
Discharge instructions reviewed with patient per . Follow up appt scheduled and AVS given to patient.
9/22/2020) 60 tablet 1    vitamin D (CHOLECALCIFEROL) 1000 units TABS tablet Take 1,000 Units by mouth daily       No current facility-administered medications on file prior to visit. OBJECTIVE:    VS:   Vitals:    09/22/20 0854   BP: (!) 154/73   Pulse: 99   Resp: 16   Temp: 97.1 °F (36.2 °C)   TempSrc: Temporal   Weight: 167 lb (75.8 kg)   Height: 5' (1.524 m)     Physical Exam  Constitutional:       Appearance: He is well-developed. HENT:      Head: Normocephalic and atraumatic. Eyes:      Pupils: Pupils are equal, round, and reactive to light. Neck:      Thyroid: No thyromegaly. Vascular: No JVD. Cardiovascular:      Rate and Rhythm: Normal rate and regular rhythm. Heart sounds: Normal heart sounds. No murmur. No friction rub. No gallop. Pulmonary:      Effort: No respiratory distress. Breath sounds: No stridor. No wheezing or rales. Comments: Bilateral expiratory crackles bilaterally on the lower lung field. Chest:      Chest wall: No tenderness. Abdominal:      General: There is no distension. Palpations: Abdomen is soft. There is no mass. Tenderness: There is no abdominal tenderness. There is no guarding or rebound. Lymphadenopathy:      Cervical: No cervical adenopathy. Neurological:      Mental Status: He is alert and oriented to person, place, and time. Sensory: No sensory deficit. Deep Tendon Reflexes: Reflexes normal.   Psychiatric:         Behavior: Behavior normal.           ASSESSMENT/PLAN:   Diagnosis Orders   1. Exertional dyspnea  Cardiac Stress Test Exercise - Treadmill    Echocardiogram complete   2. Essential hypertension  amLODIPine (NORVASC) 5 MG tablet   3. Gastroesophageal reflux disease without esophagitis  pantoprazole (PROTONIX) 40 MG tablet   4. Mixed hyperlipidemia  atorvastatin (LIPITOR) 40 MG tablet     Exertional dyspnea.     Order EKG stress test.  Get a complete echocardiogram.  Denied having viral-like

## 2020-10-12 ENCOUNTER — TELEPHONE (OUTPATIENT)
Dept: NON INVASIVE DIAGNOSTICS | Age: 50
End: 2020-10-12

## 2020-10-13 ENCOUNTER — HOSPITAL ENCOUNTER (OUTPATIENT)
Dept: NON INVASIVE DIAGNOSTICS | Age: 50
Discharge: HOME | End: 2020-10-13
Payer: MEDICAID

## 2020-10-13 PROCEDURE — 93018 CV STRESS TEST I&R ONLY: CPT | Performed by: INTERNAL MEDICINE

## 2020-10-13 PROCEDURE — 93017 CV STRESS TEST TRACING ONLY: CPT

## 2020-10-13 PROCEDURE — 93016 CV STRESS TEST SUPVJ ONLY: CPT | Performed by: INTERNAL MEDICINE

## 2020-11-09 ENCOUNTER — TELEPHONE (OUTPATIENT)
Dept: INTERNAL MEDICINE | Age: 50
End: 2020-11-09

## 2020-11-09 NOTE — TELEPHONE ENCOUNTER
Andres Mcadams from [facility] Echo. The patient cancelled his appt for echo and did not wish to reschedule at this time. He will call them back.

## 2021-03-29 NOTE — PROGRESS NOTES
[facility]  Internal Medicine Residency Program  ACC Note      SUBJECTIVE:  PMH:  Essential hypertension (amlodipine 5 mg daily)  GERD (pantoprazole 40 mg once daily)  Hyperlipidemia (atorvastatin 10 mg daily),  on 2020  Vitamin D deficiency (vitamin D3 1000 units), low vitamin D level on record  Depression, major, recurrent (aripiprazole 15 mg, venlafaxine 150 mg ER come trazodone 50 mg HS, mirtazapine 7.5 mg, Ingrezza 40 mg)    CC: had concerns including Heartburn, Hypertension, and Medication Refill. Rick Araiza presented to the [facility] for a routine visit    Recent issues:  Acid reflux and heart burn x 3 weeks  - ran out of medication  - epigastric region   - more when empty stomach   - not associated with exertion  - no blood in stool and no black tarry stool  - some nausea but no vomiting  - pantoprazole 40mg OD before breakfast x 4 weeks then wean off    Constipated:  - twice a week  - stool consistency described as normal and not hard pellet like  - senna-docusate nightly then prn    Cough:  - dry without sputum production  - no fever  - no pleuritic chest pain  - does explain some symptom of having easiness of breathing when sits down rather that exertional dyspnea  - ronchi cleared after cough on bilateral chest with wheezes  - albuterol PRN for now  - tessalon pearls  - PFT later if this symptoms persist  - will defer from any imaging like chest xray for now    Last visit on 9/22/2021: Patient was complaining of exertional dyspnea on the last visit. EKG stress test was ordered and an echocardiogram as well. Patient's stress test was done on October 12, 2020 which was was stopped at 1min 52 sec with MPHR 65% on patient's request due to LE weakness, no new arrhythmias or EKG changes at the sub-optimal HR response. Cardio recommended NM stress test if deemed necessary. Didn't show up for echo and didn't reschedule.     Chronic issues:  Essential hypertension 9/22/2020) 60 tablet 1    vitamin D (CHOLECALCIFEROL) 1000 units TABS tablet Take 1,000 Units by mouth daily       No current facility-administered medications on file prior to visit. OBJECTIVE:    VS: /88   Pulse 105   Temp 97.1 °F (36.2 °C) (Oral)   Resp 16   Ht 5' (1.524 m)   Wt 173 lb 9.6 oz (78.7 kg)   BMI 33.90 kg/m²   General appearance: Alert, Awake, Oriented times 3, no distress  Head: atraumatic, normocephalic  Neck: no JVD, trachea midline, no adenopathy  Ear: bilateral grossly equal hearing, external ear normal  Eyes: bilateral pupils are equal, round and reactive, no icterus, no pallor, EOM intact, no nystagmus  Lungs: bilateral ronchi cleared after cough; expiratory wheezes in all field  Heart: S1 S2  Regular rate and rhythm. No rub, murmur or gallop  Abdomen: Abdomen soft but obese, non-tender. non-distended BS normal. No masses, organomegaly, no guarding rebound or rigidity. Extremities: No edema, Peripheral pulses palpable 2/4  Hematology: no petechia, purpura or ecchymoses, no adenopathy  Neurology: CN grossly intact, power/reflex/bulk bilateral equal in all extremities  Psych: normal behaviour, thought process intact, sleep normal, anhedonia absent, normal energy, normal concentration, no suicidal ideation    ASSESSMENT/PLAN:  Rohit Lizarraga was seen today for heartburn, hypertension and medication refill. Diagnoses and all orders for this visit:    Vitamin D deficiency  -     Vitamin D 25 Hydroxy; Future    Essential hypertension  -     amLODIPine (NORVASC) 5 MG tablet; Take 1 tablet by mouth daily    Gastroesophageal reflux disease without esophagitis  -     pantoprazole (PROTONIX) 40 MG tablet; TAKE 1 TABLET BY MOUTH EVERY MORNING BEFORE BREAKFAST    Mixed hyperlipidemia  -     LIPID PANEL; Future  -     atorvastatin (LIPITOR) 40 MG tablet; Take 1 tablet by mouth daily    Constipation, unspecified constipation type  -     senna-docusate (PERICOLACE) 8.6-50 MG per tablet;  Take 2 tablets by mouth nightly    Cough  -     benzonatate (TESSALON) 100 MG capsule; Take 1 capsule by mouth 2 times daily as needed for Cough    Wheezes  -     albuterol sulfate HFA (VENTOLIN HFA) 108 (90 Base) MCG/ACT inhaler; Inhale 2 puffs into the lungs 4 times daily as needed for Wheezing        I have reviewed all pertient PMHx, PSHx, FamHx, Social Hx, medications, and allergies and updated history as appropriate.     RTC:    I have reviewed my findings and recommendations with Alison Dee and Dr Fransisco Campbell MD PGY-2   3/30/2021 9:22 AM

## 2021-03-30 ENCOUNTER — OFFICE VISIT (OUTPATIENT)
Dept: INTERNAL MEDICINE | Age: 51
End: 2021-03-30
Payer: MEDICAID

## 2021-03-30 ENCOUNTER — IMMUNIZATION (OUTPATIENT)
Dept: PRIMARY CARE CLINIC | Age: 51
End: 2021-03-30
Payer: MEDICAID

## 2021-03-30 VITALS
HEIGHT: 60 IN | TEMPERATURE: 97.1 F | DIASTOLIC BLOOD PRESSURE: 88 MMHG | BODY MASS INDEX: 34.08 KG/M2 | HEART RATE: 105 BPM | SYSTOLIC BLOOD PRESSURE: 136 MMHG | WEIGHT: 173.6 LBS | RESPIRATION RATE: 16 BRPM

## 2021-03-30 DIAGNOSIS — I10 ESSENTIAL HYPERTENSION: ICD-10-CM

## 2021-03-30 DIAGNOSIS — R06.2 WHEEZES: ICD-10-CM

## 2021-03-30 DIAGNOSIS — K59.00 CONSTIPATION, UNSPECIFIED CONSTIPATION TYPE: ICD-10-CM

## 2021-03-30 DIAGNOSIS — K21.9 GASTROESOPHAGEAL REFLUX DISEASE WITHOUT ESOPHAGITIS: ICD-10-CM

## 2021-03-30 DIAGNOSIS — E78.2 MIXED HYPERLIPIDEMIA: ICD-10-CM

## 2021-03-30 DIAGNOSIS — E55.9 VITAMIN D DEFICIENCY: Primary | ICD-10-CM

## 2021-03-30 DIAGNOSIS — R05.9 COUGH: ICD-10-CM

## 2021-03-30 PROCEDURE — 3017F COLORECTAL CA SCREEN DOC REV: CPT | Performed by: INTERNAL MEDICINE

## 2021-03-30 PROCEDURE — G8427 DOCREV CUR MEDS BY ELIG CLIN: HCPCS | Performed by: INTERNAL MEDICINE

## 2021-03-30 PROCEDURE — 91300 COVID-19, PFIZER VACCINE 30MCG/0.3ML DOSE: CPT | Performed by: INTERNAL MEDICINE

## 2021-03-30 PROCEDURE — 99212 OFFICE O/P EST SF 10 MIN: CPT | Performed by: INTERNAL MEDICINE

## 2021-03-30 PROCEDURE — 1036F TOBACCO NON-USER: CPT | Performed by: INTERNAL MEDICINE

## 2021-03-30 PROCEDURE — 99213 OFFICE O/P EST LOW 20 MIN: CPT | Performed by: INTERNAL MEDICINE

## 2021-03-30 PROCEDURE — 0001A COVID-19, PFIZER VACCINE 30MCG/0.3ML DOSE: CPT | Performed by: INTERNAL MEDICINE

## 2021-03-30 PROCEDURE — G8484 FLU IMMUNIZE NO ADMIN: HCPCS | Performed by: INTERNAL MEDICINE

## 2021-03-30 PROCEDURE — G8417 CALC BMI ABV UP PARAM F/U: HCPCS | Performed by: INTERNAL MEDICINE

## 2021-03-30 RX ORDER — AMOXICILLIN 250 MG
2 CAPSULE ORAL NIGHTLY
Qty: 60 TABLET | Refills: 3 | Status: SHIPPED
Start: 2021-03-30 | End: 2022-01-14 | Stop reason: SDUPTHER

## 2021-03-30 RX ORDER — BENZONATATE 100 MG/1
100 CAPSULE ORAL 2 TIMES DAILY PRN
Qty: 30 CAPSULE | Refills: 0 | Status: SHIPPED | OUTPATIENT
Start: 2021-03-30 | End: 2021-04-14

## 2021-03-30 RX ORDER — ATORVASTATIN CALCIUM 40 MG/1
40 TABLET, FILM COATED ORAL DAILY
Qty: 90 TABLET | Refills: 1 | Status: SHIPPED
Start: 2021-03-30 | End: 2021-05-27 | Stop reason: SDUPTHER

## 2021-03-30 RX ORDER — ALBUTEROL SULFATE 90 UG/1
2 AEROSOL, METERED RESPIRATORY (INHALATION) 4 TIMES DAILY PRN
Qty: 1 INHALER | Refills: 0 | Status: SHIPPED
Start: 2021-03-30 | End: 2021-05-27 | Stop reason: SDUPTHER

## 2021-03-30 RX ORDER — PANTOPRAZOLE SODIUM 40 MG/1
TABLET, DELAYED RELEASE ORAL
Qty: 30 TABLET | Refills: 3 | Status: SHIPPED
Start: 2021-03-30 | End: 2021-05-27 | Stop reason: SDUPTHER

## 2021-03-30 RX ORDER — AMLODIPINE BESYLATE 5 MG/1
5 TABLET ORAL DAILY
Qty: 90 TABLET | Refills: 1 | Status: SHIPPED
Start: 2021-03-30 | End: 2021-05-27 | Stop reason: SDUPTHER

## 2021-03-30 NOTE — PATIENT INSTRUCTIONS
-Start taking pantoprazole 40 mg once daily before breakfast.  Continue taking this for 4 weeks.   After that try to wean off by taking it once every other day and stop.  -Start taking sennadocusate every night until you have good bowel movement, then you can take as needed.  -Start taking Tessalon Perles for the cough as needed for the next 10 days.  -Start using albuterol inhaler as needed, every 4 hours, 2 puffs at a time.  -Start taking your blood pressure medicationamlodipine 5 mg daily  -Start taking your cholesterol medication as wellatorvastatin 40 mg nightly

## 2021-03-30 NOTE — PROGRESS NOTES
Printed lab scripts and all instructions reviewed with pt by dr Franko Page appt scheduled and printed avs given to patient  Pt directed to covid clinic to receive his first injection

## 2021-03-30 NOTE — PROGRESS NOTES
[facility]  Internal Medicine Residency Clinic    Attending Physician Statement  I have discussed the case, including pertinent history and exam findings with the resident physician. I agree with the assessment, plan and orders as documented by the resident. I have reviewed all pertinent PMHx, PSHx, FamHx, SocialHx, medications, and allergies and updated history as appropriate. Patient presents for routine follow up of medical problems. Pt presented for the follow up of   1. GERD-symptoms recurred due to lack of med, TUMS did not resolve the symptoms and has been drinking a lot of milk, recommend to resume PPI and may taper after a month use. 2. Constipation - need senokot and docusate  3. Also reported a week duration of cough, no chest pain. Pt does have rhonchi, no HO COPD. Trial of albuterol at this time with previous history of the use, and may need PFT if no improvement. Total time spent 35 minutes in this visit. Remainder of medical problems as per resident note.     Ricardo Hope MD  3/30/2021 9:01 AM

## 2021-04-03 ENCOUNTER — HOSPITAL ENCOUNTER (OUTPATIENT)
Age: 51
Discharge: HOME | End: 2021-04-03
Payer: MEDICAID

## 2021-04-03 DIAGNOSIS — E55.9 VITAMIN D DEFICIENCY: ICD-10-CM

## 2021-04-03 DIAGNOSIS — E78.2 MIXED HYPERLIPIDEMIA: ICD-10-CM

## 2021-04-03 LAB
CHOLESTEROL, TOTAL: 224 MG/DL (ref 0–199)
HDLC SERPL-MCNC: 63 MG/DL
LDL CHOLESTEROL CALCULATED: 131 MG/DL (ref 0–99)
TRIGL SERPL-MCNC: 150 MG/DL (ref 0–149)
VITAMIN D 25-HYDROXY: 32 NG/ML (ref 30–100)
VLDLC SERPL CALC-MCNC: 30 MG/DL

## 2021-04-03 PROCEDURE — 80061 LIPID PANEL: CPT

## 2021-04-03 PROCEDURE — 82306 VITAMIN D 25 HYDROXY: CPT

## 2021-04-03 PROCEDURE — 36415 COLL VENOUS BLD VENIPUNCTURE: CPT

## 2021-04-20 ENCOUNTER — IMMUNIZATION (OUTPATIENT)
Dept: PRIMARY CARE CLINIC | Age: 51
End: 2021-04-20
Payer: MEDICAID

## 2021-04-20 PROCEDURE — 0002A COVID-19, PFIZER VACCINE 30MCG/0.3ML DOSE: CPT | Performed by: INTERNAL MEDICINE

## 2021-04-20 PROCEDURE — 91300 COVID-19, PFIZER VACCINE 30MCG/0.3ML DOSE: CPT | Performed by: INTERNAL MEDICINE

## 2021-05-27 ENCOUNTER — OFFICE VISIT (OUTPATIENT)
Dept: INTERNAL MEDICINE | Age: 51
End: 2021-05-27
Payer: MEDICAID

## 2021-05-27 VITALS
HEART RATE: 97 BPM | WEIGHT: 172.5 LBS | OXYGEN SATURATION: 99 % | DIASTOLIC BLOOD PRESSURE: 69 MMHG | HEIGHT: 60 IN | TEMPERATURE: 98.6 F | BODY MASS INDEX: 33.86 KG/M2 | SYSTOLIC BLOOD PRESSURE: 129 MMHG | RESPIRATION RATE: 20 BRPM

## 2021-05-27 DIAGNOSIS — T50.905D ADVERSE EFFECT OF DRUG, SUBSEQUENT ENCOUNTER: Primary | ICD-10-CM

## 2021-05-27 DIAGNOSIS — K21.9 GASTROESOPHAGEAL REFLUX DISEASE WITHOUT ESOPHAGITIS: ICD-10-CM

## 2021-05-27 DIAGNOSIS — R06.2 WHEEZES: ICD-10-CM

## 2021-05-27 DIAGNOSIS — E78.2 MIXED HYPERLIPIDEMIA: ICD-10-CM

## 2021-05-27 DIAGNOSIS — R13.19 ESOPHAGEAL DYSPHAGIA: ICD-10-CM

## 2021-05-27 DIAGNOSIS — I10 ESSENTIAL HYPERTENSION: ICD-10-CM

## 2021-05-27 PROCEDURE — G8427 DOCREV CUR MEDS BY ELIG CLIN: HCPCS | Performed by: STUDENT IN AN ORGANIZED HEALTH CARE EDUCATION/TRAINING PROGRAM

## 2021-05-27 PROCEDURE — 99212 OFFICE O/P EST SF 10 MIN: CPT | Performed by: STUDENT IN AN ORGANIZED HEALTH CARE EDUCATION/TRAINING PROGRAM

## 2021-05-27 PROCEDURE — 1036F TOBACCO NON-USER: CPT | Performed by: STUDENT IN AN ORGANIZED HEALTH CARE EDUCATION/TRAINING PROGRAM

## 2021-05-27 PROCEDURE — 99214 OFFICE O/P EST MOD 30 MIN: CPT | Performed by: STUDENT IN AN ORGANIZED HEALTH CARE EDUCATION/TRAINING PROGRAM

## 2021-05-27 PROCEDURE — 3017F COLORECTAL CA SCREEN DOC REV: CPT | Performed by: STUDENT IN AN ORGANIZED HEALTH CARE EDUCATION/TRAINING PROGRAM

## 2021-05-27 PROCEDURE — G8417 CALC BMI ABV UP PARAM F/U: HCPCS | Performed by: STUDENT IN AN ORGANIZED HEALTH CARE EDUCATION/TRAINING PROGRAM

## 2021-05-27 RX ORDER — TRAZODONE HYDROCHLORIDE 100 MG/1
TABLET ORAL
COMMUNITY
Start: 2021-05-12

## 2021-05-27 RX ORDER — ALBUTEROL SULFATE 90 UG/1
2 AEROSOL, METERED RESPIRATORY (INHALATION) 4 TIMES DAILY PRN
Qty: 1 INHALER | Refills: 0 | Status: SHIPPED | OUTPATIENT
Start: 2021-05-27

## 2021-05-27 RX ORDER — AMLODIPINE BESYLATE 5 MG/1
5 TABLET ORAL DAILY
Qty: 90 TABLET | Refills: 1 | Status: SHIPPED
Start: 2021-05-27 | End: 2022-01-14 | Stop reason: SDUPTHER

## 2021-05-27 RX ORDER — BENZONATATE 100 MG/1
100 CAPSULE ORAL 3 TIMES DAILY PRN
COMMUNITY

## 2021-05-27 RX ORDER — VALBENAZINE 80 MG/1
CAPSULE ORAL
COMMUNITY
Start: 2021-05-12

## 2021-05-27 RX ORDER — PANTOPRAZOLE SODIUM 40 MG/1
TABLET, DELAYED RELEASE ORAL
Qty: 30 TABLET | Refills: 3 | Status: SHIPPED
Start: 2021-05-27 | End: 2022-01-14 | Stop reason: SDUPTHER

## 2021-05-27 RX ORDER — ATORVASTATIN CALCIUM 40 MG/1
40 TABLET, FILM COATED ORAL DAILY
Qty: 90 TABLET | Refills: 1 | Status: SHIPPED
Start: 2021-05-27 | End: 2021-10-01

## 2021-05-27 SDOH — ECONOMIC STABILITY: FOOD INSECURITY: WITHIN THE PAST 12 MONTHS, YOU WORRIED THAT YOUR FOOD WOULD RUN OUT BEFORE YOU GOT MONEY TO BUY MORE.: NEVER TRUE

## 2021-05-27 ASSESSMENT — ENCOUNTER SYMPTOMS
SORE THROAT: 0
NAUSEA: 1
BLOOD IN STOOL: 0
CONSTIPATION: 0
VOMITING: 1
COUGH: 0
SHORTNESS OF BREATH: 0
BACK PAIN: 0
ABDOMINAL PAIN: 0
SINUS PAIN: 0
WHEEZING: 0
DIARRHEA: 0

## 2021-05-27 NOTE — PROGRESS NOTES
[facility]  InternalMedicine Residency Program    SUBJECTIVE:  CC: had concerns including Heartburn (flares up with food / water), Abdominal Pain ( x 2 months), and Emesis (after eating). Silvia Alvarado presented to the office for a routine visit  The patient is here because of having stomach problems over the last months. He stated that he has been having nausea, intermittent vomiting, and dysphagia. All of his symptoms has been progressively getting worse over the last 4 weeks. He has not been taking his medications (prescribed from the internal medicine department) over the last 4 weeks as well. Denied having any weight loss, fever, chills, and other review of systems unremarkable. Review of Systems   Constitutional: Negative for activity change, appetite change, chills, fatigue, fever and unexpected weight change. HENT: Negative for sinus pain and sore throat. Respiratory: Negative for cough, shortness of breath and wheezing. Cardiovascular: Negative for chest pain and leg swelling. Gastrointestinal: Positive for nausea and vomiting. Negative for abdominal pain, blood in stool, constipation and diarrhea. Dysphagia   Endocrine: Negative for polydipsia, polyphagia and polyuria. Genitourinary: Negative for difficulty urinating, dysuria, flank pain and hematuria. Musculoskeletal: Negative for back pain. Skin: Negative for rash. Neurological: Negative for numbness. Psychiatric/Behavioral: Negative.         Current Outpatient Medications on File Prior to Visit   Medication Sig Dispense Refill    benzonatate (TESSALON) 100 MG capsule Take 100 mg by mouth 3 times daily as needed for Cough      senna-docusate (PERICOLACE) 8.6-50 MG per tablet Take 2 tablets by mouth nightly 60 tablet 3    mirtazapine (REMERON) 7.5 MG tablet       INGREZZA 40 MG CAPS       ARIPiprazole (ABILIFY) 15 MG tablet       venlafaxine (EFFEXOR XR) 150 MG extended release capsule Take 1 capsule by mouth every morning 30 capsule 0    vitamin D (CHOLECALCIFEROL) 1000 units TABS tablet Take 1,000 Units by mouth daily      traZODone (DESYREL) 150 MG tablet Take 150 mg by mouth nightly as needed for Sleep      traZODone (DESYREL) 100 MG tablet       INGREZZA 80 MG CAPS       acetaminophen (TYLENOL) 500 MG tablet Take 1 tablet by mouth 4 times daily as needed for Pain (Patient not taking: Reported on 9/22/2020) 60 tablet 1     No current facility-administered medications on file prior to visit. OBJECTIVE:    VS:   Vitals:    05/27/21 1445   BP: 129/69   Site: Left Upper Arm   Position: Sitting   Cuff Size: Medium Adult   Pulse: 97   Resp: 20   Temp: 98.6 °F (37 °C)   TempSrc: Oral   SpO2: 99%   Weight: 172 lb 8 oz (78.2 kg)   Height: 5' (1.524 m)     Physical Exam  Constitutional:       Appearance: He is well-developed. HENT:      Head: Normocephalic and atraumatic. Eyes:      Pupils: Pupils are equal, round, and reactive to light. Neck:      Thyroid: No thyromegaly. Vascular: No JVD. Cardiovascular:      Rate and Rhythm: Normal rate and regular rhythm. Heart sounds: Normal heart sounds. No murmur heard. No friction rub. No gallop. Pulmonary:      Effort: No respiratory distress. Breath sounds: No stridor. No wheezing or rales. Chest:      Chest wall: No tenderness. Abdominal:      General: There is no distension. Palpations: Abdomen is soft. There is no mass. Tenderness: There is no abdominal tenderness. There is no guarding or rebound. Lymphadenopathy:      Cervical: No cervical adenopathy. Neurological:      Mental Status: He is alert and oriented to person, place, and time. Sensory: No sensory deficit. Deep Tendon Reflexes: Reflexes normal.   Psychiatric:         Behavior: Behavior normal.           ASSESSMENT/PLAN:   Diagnosis Orders   1. Adverse effect of drug, subsequent encounter  BASIC METABOLIC PANEL   2.  Mixed hyperlipidemia  atorvastatin (LIPITOR) 40 MG tablet   3. Gastroesophageal reflux disease without esophagitis  pantoprazole (PROTONIX) 40 MG tablet    Author MD Renato, [facility]   4. Essential hypertension  amLODIPine (NORVASC) 5 MG tablet   5. Wheezes  albuterol sulfate HFA (VENTOLIN HFA) 108 (90 Base) MCG/ACT inhaler   6. Esophageal dysphagia  Author MD Renato, General Surgery, [place]     Esophageal dysphagia  History of longstanding GERD 5 years, not currently taking his pantoprazole as prescribed due to confusion on the indications of the medications that he is taking. He is a previous smoker denies having any weight loss, however admitted he is having nausea, and intermittent vomiting. His symptoms appear to progressive in nature. We will resume his pantoprazole as prescribed before and refer to do an upper endoscopy    Essential hypertension (controlled). Counseled about lifestyle changes. Continue amlodipine 5 mg daily.     GERD  Controlled as mentioned above  Continue pantoprazole. Referral for an upper endoscopy.     Hyperlipidemia  On atorvastatin 10 mg daily. Tolerating, will continue. Healthcare maintenance  Order BMP. Colonoscopy refused    RTC:  Return in about 2 months (around 7/27/2021).     I have reviewed my findings and recommendations with Stephanie Del Castillo and Dr Aniyah Garcia MD, MD, PGY2  5/27/2021 3:25 PM

## 2021-05-27 NOTE — PROGRESS NOTES
Patient given instruction by Dr Angy Joshi. 2 month follow up scheduled. Printed AVS given to patient.

## 2021-05-27 NOTE — PATIENT INSTRUCTIONS
1.  Referral was done to general surgery for an upper endoscopy. 2.  Please continue to take your medications as prescribed. 3.  Please call us in 4 weeks to make your appointment with internal medicine clinic in 2 months. 4.  Please do your blood work before your next visit.

## 2021-06-07 ENCOUNTER — TELEPHONE (OUTPATIENT)
Dept: SURGERY | Age: 51
End: 2021-06-07

## 2021-06-07 NOTE — TELEPHONE ENCOUNTER
First attempt, Left message to schedule pt with first available surgeon at [facility] for a consult, GERD.   Electronically signed by Mikel Larsen on 6/7/21 at 3:33 PM EDT

## 2021-06-11 ENCOUNTER — TELEPHONE (OUTPATIENT)
Dept: SURGERY | Age: 51
End: 2021-06-11

## 2021-06-11 NOTE — TELEPHONE ENCOUNTER
MA made second attempt to contact patient to schedule appointment for EGD consult based on referral by Dr Conchis Hope. Patient did not answer so MA left message requesting return call to schedule.      Electronically signed by Farhana Goncalves on 6/11/21 at 3:49 PM EDT

## 2021-06-18 ENCOUNTER — TELEPHONE (OUTPATIENT)
Dept: SURGERY | Age: 51
End: 2021-06-18

## 2021-06-18 NOTE — TELEPHONE ENCOUNTER
MA made third attempt to contact patient to schedule appointment based on referral by Dr Alda Gonzalez for GERD consult. Patient did not answer so MA left message requesting return call to schedule. Referral returned to sender.      Electronically signed by Avery Busby on 6/18/21 at 10:45 AM EDT

## 2021-10-01 DIAGNOSIS — E78.2 MIXED HYPERLIPIDEMIA: ICD-10-CM

## 2021-10-01 RX ORDER — ATORVASTATIN CALCIUM 40 MG/1
40 TABLET, FILM COATED ORAL DAILY
Qty: 90 TABLET | Refills: 1 | Status: SHIPPED
Start: 2021-10-01 | End: 2022-01-14 | Stop reason: SDUPTHER

## 2022-01-11 ENCOUNTER — APPOINTMENT (OUTPATIENT)
Dept: CT IMAGING | Age: 52
End: 2022-01-11
Payer: MEDICAID

## 2022-01-11 ENCOUNTER — HOSPITAL ENCOUNTER (EMERGENCY)
Age: 52
Discharge: HOME | End: 2022-01-12
Attending: EMERGENCY MEDICINE
Payer: MEDICAID

## 2022-01-11 DIAGNOSIS — R10.9 ABDOMINAL PAIN, UNSPECIFIED ABDOMINAL LOCATION: Primary | ICD-10-CM

## 2022-01-11 DIAGNOSIS — N40.0 ENLARGED PROSTATE: ICD-10-CM

## 2022-01-11 LAB
ALBUMIN SERPL-MCNC: 4.5 G/DL (ref 3.5–5.2)
ALP BLD-CCNC: 128 U/L (ref 40–129)
ALT SERPL-CCNC: 16 U/L (ref 0–40)
ANION GAP SERPL CALCULATED.3IONS-SCNC: 13 MMOL/L (ref 7–16)
AST SERPL-CCNC: 16 U/L (ref 0–39)
BASOPHILS ABSOLUTE: 0.1 E9/L (ref 0–0.2)
BASOPHILS RELATIVE PERCENT: 1.3 % (ref 0–2)
BILIRUB SERPL-MCNC: 0.7 MG/DL (ref 0–1.2)
BUN BLDV-MCNC: 12 MG/DL (ref 6–20)
CALCIUM SERPL-MCNC: 9.8 MG/DL (ref 8.6–10.2)
CHLORIDE BLD-SCNC: 100 MMOL/L (ref 98–107)
CO2: 24 MMOL/L (ref 22–29)
CREAT SERPL-MCNC: 0.9 MG/DL (ref 0.7–1.2)
EKG ATRIAL RATE: 105 BPM
EKG P AXIS: 74 DEGREES
EKG P-R INTERVAL: 160 MS
EKG Q-T INTERVAL: 320 MS
EKG QRS DURATION: 74 MS
EKG QTC CALCULATION (BAZETT): 422 MS
EKG R AXIS: 62 DEGREES
EKG T AXIS: 68 DEGREES
EKG VENTRICULAR RATE: 105 BPM
EOSINOPHILS ABSOLUTE: 0.54 E9/L (ref 0.05–0.5)
EOSINOPHILS RELATIVE PERCENT: 7.2 % (ref 0–6)
GFR AFRICAN AMERICAN: >60
GFR NON-AFRICAN AMERICAN: >60 ML/MIN/1.73
GLUCOSE BLD-MCNC: 99 MG/DL (ref 74–99)
HCT VFR BLD CALC: 45.7 % (ref 37–54)
HEMOGLOBIN: 14.7 G/DL (ref 12.5–16.5)
IMMATURE GRANULOCYTES #: 0.01 E9/L
IMMATURE GRANULOCYTES %: 0.1 % (ref 0–5)
LACTIC ACID: 1.2 MMOL/L (ref 0.5–2.2)
LIPASE: 92 U/L (ref 13–60)
LYMPHOCYTES ABSOLUTE: 1.62 E9/L (ref 1.5–4)
LYMPHOCYTES RELATIVE PERCENT: 21.5 % (ref 20–42)
MCH RBC QN AUTO: 25 PG (ref 26–35)
MCHC RBC AUTO-ENTMCNC: 32.2 % (ref 32–34.5)
MCV RBC AUTO: 77.7 FL (ref 80–99.9)
MONOCYTES ABSOLUTE: 0.63 E9/L (ref 0.1–0.95)
MONOCYTES RELATIVE PERCENT: 8.3 % (ref 2–12)
NEUTROPHILS ABSOLUTE: 4.65 E9/L (ref 1.8–7.3)
NEUTROPHILS RELATIVE PERCENT: 61.6 % (ref 43–80)
PDW BLD-RTO: 14 FL (ref 11.5–15)
PLATELET # BLD: 295 E9/L (ref 130–450)
PMV BLD AUTO: 10 FL (ref 7–12)
POTASSIUM SERPL-SCNC: 4.1 MMOL/L (ref 3.5–5)
RBC # BLD: 5.88 E12/L (ref 3.8–5.8)
SARS-COV-2, NAAT: NOT DETECTED
SODIUM BLD-SCNC: 137 MMOL/L (ref 132–146)
STREP GRP A PCR: NEGATIVE
TOTAL PROTEIN: 7.8 G/DL (ref 6.4–8.3)
TROPONIN, HIGH SENSITIVITY: <6 NG/L (ref 0–11)
WBC # BLD: 7.6 E9/L (ref 4.5–11.5)

## 2022-01-11 PROCEDURE — 2580000003 HC RX 258: Performed by: EMERGENCY MEDICINE

## 2022-01-11 PROCEDURE — 85025 COMPLETE CBC W/AUTO DIFF WBC: CPT

## 2022-01-11 PROCEDURE — 96374 THER/PROPH/DIAG INJ IV PUSH: CPT

## 2022-01-11 PROCEDURE — 80053 COMPREHEN METABOLIC PANEL: CPT

## 2022-01-11 PROCEDURE — 87880 STREP A ASSAY W/OPTIC: CPT

## 2022-01-11 PROCEDURE — 93010 ELECTROCARDIOGRAM REPORT: CPT | Performed by: INTERNAL MEDICINE

## 2022-01-11 PROCEDURE — 99284 EMERGENCY DEPT VISIT MOD MDM: CPT

## 2022-01-11 PROCEDURE — 6360000002 HC RX W HCPCS: Performed by: EMERGENCY MEDICINE

## 2022-01-11 PROCEDURE — 83605 ASSAY OF LACTIC ACID: CPT

## 2022-01-11 PROCEDURE — 93005 ELECTROCARDIOGRAM TRACING: CPT | Performed by: PHYSICIAN ASSISTANT

## 2022-01-11 PROCEDURE — 83690 ASSAY OF LIPASE: CPT

## 2022-01-11 PROCEDURE — 87635 SARS-COV-2 COVID-19 AMP PRB: CPT

## 2022-01-11 PROCEDURE — 2580000003 HC RX 258: Performed by: RADIOLOGY

## 2022-01-11 PROCEDURE — 6360000004 HC RX CONTRAST MEDICATION: Performed by: RADIOLOGY

## 2022-01-11 PROCEDURE — 84484 ASSAY OF TROPONIN QUANT: CPT

## 2022-01-11 PROCEDURE — 74177 CT ABD & PELVIS W/CONTRAST: CPT

## 2022-01-11 RX ORDER — KETOROLAC TROMETHAMINE 30 MG/ML
15 INJECTION, SOLUTION INTRAMUSCULAR; INTRAVENOUS ONCE
Status: COMPLETED | OUTPATIENT
Start: 2022-01-11 | End: 2022-01-11

## 2022-01-11 RX ORDER — 0.9 % SODIUM CHLORIDE 0.9 %
1000 INTRAVENOUS SOLUTION INTRAVENOUS ONCE
Status: COMPLETED | OUTPATIENT
Start: 2022-01-11 | End: 2022-01-11

## 2022-01-11 RX ORDER — SODIUM CHLORIDE 9 MG/ML
INJECTION, SOLUTION INTRAVENOUS CONTINUOUS
Status: DISCONTINUED | OUTPATIENT
Start: 2022-01-11 | End: 2022-01-12 | Stop reason: HOSPADM

## 2022-01-11 RX ORDER — SODIUM CHLORIDE 0.9 % (FLUSH) 0.9 %
10 SYRINGE (ML) INJECTION PRN
Status: COMPLETED | OUTPATIENT
Start: 2022-01-11 | End: 2022-01-11

## 2022-01-11 RX ADMIN — KETOROLAC TROMETHAMINE 15 MG: 30 INJECTION, SOLUTION INTRAMUSCULAR; INTRAVENOUS at 22:26

## 2022-01-11 RX ADMIN — SODIUM CHLORIDE 1000 ML: 9 INJECTION, SOLUTION INTRAVENOUS at 22:26

## 2022-01-11 RX ADMIN — IOPAMIDOL 90 ML: 755 INJECTION, SOLUTION INTRAVENOUS at 22:53

## 2022-01-11 RX ADMIN — Medication 10 ML: at 22:53

## 2022-01-11 RX ADMIN — SODIUM CHLORIDE: 9 INJECTION, SOLUTION INTRAVENOUS at 23:05

## 2022-01-11 ASSESSMENT — PAIN SCALES - GENERAL: PAINLEVEL_OUTOF10: 4

## 2022-01-11 NOTE — ED NOTES
Department of Emergency Medicine  FIRST PROVIDER TRIAGE NOTE             Independent MLP           1/11/22  12:28 PM EST    Date of Encounter: 1/11/22   MRN: [id number]      HPI: Harriett Carias is a 46 y.o. male who presents to the ED for Nausea (x2 days, denies abd pain) and Dysphagia (since yesterday)     Pt presenting with nausea, light headedness for the past few days. Pt states he is also having difficulty swallowing because his mouth is dry. ROS: Negative for cp or sob. PE: Gen Appearance/Constitutional: alert  HEENT: NC/NT. PERRLA,  Airway patent. Initial Plan of Care: All treatment areas with department are currently occupied.  Plan to order/Initiate the following while awaiting opening in ED: labs, ekg    Initial Plan of Care: Initiate Treatment-Testing, Proceed toTreatment Area When Bed Available for ED Attending/MLP to Continue Care    Electronically signed by Elaine Augustine PA-C   DD: 1/11/22       Elaine Augustine PA-C  01/11/22 5329

## 2022-01-12 VITALS
HEIGHT: 60 IN | HEART RATE: 91 BPM | OXYGEN SATURATION: 96 % | SYSTOLIC BLOOD PRESSURE: 120 MMHG | RESPIRATION RATE: 16 BRPM | BODY MASS INDEX: 27.48 KG/M2 | TEMPERATURE: 98.3 F | DIASTOLIC BLOOD PRESSURE: 73 MMHG | WEIGHT: 140 LBS

## 2022-01-12 LAB
BILIRUBIN URINE: NEGATIVE
BLOOD, URINE: NEGATIVE
CLARITY: CLEAR
COLOR: YELLOW
GLUCOSE URINE: NEGATIVE MG/DL
KETONES, URINE: NEGATIVE MG/DL
LEUKOCYTE ESTERASE, URINE: NEGATIVE
NITRITE, URINE: NEGATIVE
PH UA: 5 (ref 5–9)
PROTEIN UA: NEGATIVE MG/DL
SPECIFIC GRAVITY UA: 1.02 (ref 1–1.03)
UROBILINOGEN, URINE: 0.2 E.U./DL

## 2022-01-12 PROCEDURE — 87088 URINE BACTERIA CULTURE: CPT

## 2022-01-12 PROCEDURE — 81003 URINALYSIS AUTO W/O SCOPE: CPT

## 2022-01-12 RX ORDER — POLYETHYLENE GLYCOL 3350 17 G/17G
17 POWDER, FOR SOLUTION ORAL DAILY
Qty: 238 G | Refills: 0 | Status: SHIPPED | OUTPATIENT
Start: 2022-01-12

## 2022-01-12 NOTE — ED PROVIDER NOTES
Department of Emergency Medicine   ED  Provider Note  Admit Date/RoomTime: 1/11/2022  9:50 PM  ED Room: 07/07          History of Present Illness:  1/12/22, Time: 12:32 AM EST  Chief Complaint   Patient presents with    Nausea     x2 days, denies abd pain    Dysphagia     since yesterday                Silvestre Mo is a 46 y.o. male presenting to the ED for abdominal pain. Patient has had lower abdominal pain for the past 2 days. Came on gradually, nothing makes it better or worse, does not rating her. States he feels constipated. He also states he has a sore throat, he has trouble swallowing because of the pain. He is able to swallow without problem, it is just uncomfortable. Is concerned he may have COVID. He is not vaccinated. He is not sure has been exposed. Denies fever, chills, chest pain, back pain, cough, sputum, change in urinary symptoms, or any other symptoms or complaints. Review of Systems:   Pertinent positives and negatives are stated within HPI, all other systems reviewed and are negative.        --------------------------------------------- PAST HISTORY ---------------------------------------------  Past Medical History:  has a past medical history of Basal cell carcinoma of skin, [facility] Depression, Hypertension, and Severe episode of recurrent major depressive disorder, without psychotic features ([facility] Past Surgical History:  has a past surgical history that includes Brain tumor excision (1972); Appendectomy; Mandible surgery; and other surgical history (Right, 6/11/2014). Social History:  reports that he has quit smoking. He has a 5.00 pack-year smoking history. He has never used smokeless tobacco. He reports that he does not drink alcohol and does not use drugs. Family History: family history includes Alcohol Abuse in his father; Cancer in his father; Stroke in his mother. . Unless otherwise noted, family history is non contributory    The patients home medications have been reviewed. Allergies: Patient has no known allergies. ---------------------------------------------------PHYSICAL EXAM--------------------------------------    Constitutional/General: Alert and oriented x3  Head: Normocephalic and atraumatic  Eyes: PERRL, EOMI, sclera non icteric  Mouth: Oropharynx clear, handling secretions, no trismus, no asymmetry of the posterior oropharynx or uvular edema  Neck: Supple, full ROM, no stridor, no meningeal signs  Respiratory: Lungs clear to auscultation bilaterally, no wheezes, rales, or rhonchi. Not in respiratory distress  Cardiovascular:  Regular rate. Regular rhythm. 2+ distal pulses. Equal extremity pulses. Chest: No chest wall tenderness  GI:  Abdomen Soft, with lower abdominal tenderness, no guarding or rebound, bowel sounds normal, no pain over McBurney's point, Rovsing sign negative  Musculoskeletal: Moves all extremities x 4. Warm and well perfused, no clubbing, cyanosis, or edema. Capillary refill <3 seconds  Integument: skin warm and dry. No rashes. Neurologic: GCS 15, no focal deficits, symmetric strength 5/5 in the upper and lower extremities bilaterally  Psychiatric: Normal Affect          -------------------------------------------------- RESULTS -------------------------------------------------  I have personally reviewed all laboratory and imaging results for this patient. Results are listed below.      LABS: (Lab results interpreted by me)  Results for orders placed or performed during the hospital encounter of 01/11/22   COVID-19, Rapid    Specimen: Nasopharyngeal Swab   Result Value Ref Range    SARS-CoV-2, NAAT Not Detected Not Detected   Strep Screen Group A Throat    Specimen: Throat   Result Value Ref Range    Strep Grp A PCR Negative Negative   CBC Auto Differential   Result Value Ref Range    WBC 7.6 4.5 - 11.5 E9/L    RBC 5.88 (H) 3.80 - 5.80 E12/L    Hemoglobin 14.7 12.5 - 16.5 g/dL    Hematocrit 45.7 37.0 - 54.0 % MCV 77.7 (L) 80.0 - 99.9 fL    MCH 25.0 (L) 26.0 - 35.0 pg    MCHC 32.2 32.0 - 34.5 %    RDW 14.0 11.5 - 15.0 fL    Platelets 833 180 - 996 E9/L    MPV 10.0 7.0 - 12.0 fL    Neutrophils % 61.6 43.0 - 80.0 %    Immature Granulocytes % 0.1 0.0 - 5.0 %    Lymphocytes % 21.5 20.0 - 42.0 %    Monocytes % 8.3 2.0 - 12.0 %    Eosinophils % 7.2 (H) 0.0 - 6.0 %    Basophils % 1.3 0.0 - 2.0 %    Neutrophils Absolute 4.65 1.80 - 7.30 E9/L    Immature Granulocytes # 0.01 E9/L    Lymphocytes Absolute 1.62 1.50 - 4.00 E9/L    Monocytes Absolute 0.63 0.10 - 0.95 E9/L    Eosinophils Absolute 0.54 (H) 0.05 - 0.50 E9/L    Basophils Absolute 0.10 0.00 - 0.20 E9/L   Comprehensive Metabolic Panel   Result Value Ref Range    Sodium 137 132 - 146 mmol/L    Potassium 4.1 3.5 - 5.0 mmol/L    Chloride 100 98 - 107 mmol/L    CO2 24 22 - 29 mmol/L    Anion Gap 13 7 - 16 mmol/L    Glucose 99 74 - 99 mg/dL    BUN 12 6 - 20 mg/dL    CREATININE 0.9 0.7 - 1.2 mg/dL    GFR Non-African American >60 >=60 mL/min/1.73    GFR African American >60     Calcium 9.8 8.6 - 10.2 mg/dL    Total Protein 7.8 6.4 - 8.3 g/dL    Albumin 4.5 3.5 - 5.2 g/dL    Total Bilirubin 0.7 0.0 - 1.2 mg/dL    Alkaline Phosphatase 128 40 - 129 U/L    ALT 16 0 - 40 U/L    AST 16 0 - 39 U/L   Troponin   Result Value Ref Range    Troponin, High Sensitivity <6 0 - 11 ng/L   Lipase   Result Value Ref Range    Lipase 92 (H) 13 - 60 U/L   Lactic Acid, Plasma   Result Value Ref Range    Lactic Acid 1.2 0.5 - 2.2 mmol/L   EKG 12 Lead   Result Value Ref Range    Ventricular Rate 105 BPM    Atrial Rate 105 BPM    P-R Interval 160 ms    QRS Duration 74 ms    Q-T Interval 320 ms    QTc Calculation (Bazett) 422 ms    P Axis 74 degrees    R Axis 62 degrees    T Axis 68 degrees   ,       RADIOLOGY:  Interpreted by Radiologist unless otherwise specified  CT ABDOMEN PELVIS W IV CONTRAST Additional Contrast? None   Final Result   Diffuse colonic fecal retention.       Prostatomegaly with diffuse urinary bladder wall thickening. EKG Interpretation  Interpreted by emergency department physician, Dr. Carlton Szymanski   Sinus rhythm, rate 105, no ischemic change        ------------------------- NURSING NOTES AND VITALS REVIEWED ---------------------------   The nursing notes within the ED encounter and vital signs as below have been reviewed by myself  /87   Pulse 98   Temp 98.3 °F (36.8 °C)   Resp 20   Ht 5' (1.524 m)   Wt 140 lb (63.5 kg)   SpO2 95%   BMI 27.34 kg/m²     Oxygen Saturation Interpretation: Normal    The patients available past medical records and past encounters were reviewed. ------------------------------ ED COURSE/MEDICAL DECISION MAKING----------------------  Medications   0.9 % sodium chloride infusion ( IntraVENous New Bag 1/11/22 2305)   0.9 % sodium chloride bolus (0 mLs IntraVENous Stopped 1/11/22 2305)   ketorolac (TORADOL) injection 15 mg (15 mg IntraVENous Given 1/11/22 2226)   iopamidol (ISOVUE-370) 76 % injection 90 mL (90 mLs IntraVENous Given 1/11/22 2253)   sodium chloride flush 0.9 % injection 10 mL (10 mLs IntraVENous Given 1/11/22 2253)              Medical Decision Making:    Patient presents with a nonacute abdomen. Labs and imaging reviewed. Reevaluation, he is resting comfortably. No symptoms or complaints, pain is resolved. Repeat abdominal examination does not indicate a surgical abdomen, he is tolerating p.o. Given this, along with his findings, did not feel that further emergent evaluation was indicated. Patient was discharged. Patient is to follow-up with the PCP in 1 to 2 days. He is educated on his enlarged prostate. Patient is to have a repeat abdominal examination on the emergent basis if new or worsening symptoms arise. Counseling:    The emergency provider has spoken with the patient and discussed todays results, in addition to providing specific details for the plan of care and counseling regarding the diagnosis and prognosis. Questions are answered at this time and they are agreeable with the plan.       --------------------------------- IMPRESSION AND DISPOSITION ---------------------------------    IMPRESSION  1. Abdominal pain, unspecified abdominal location    2. Enlarged prostate        DISPOSITION  Disposition: Discharge to home  Patient condition is stable        NOTE: This report was transcribed using voice recognition software.  Every effort was made to ensure accuracy; however, inadvertent computerized transcription errors may be present       Lamont Kimbrough MD  01/13/22 2023

## 2022-01-12 NOTE — ED NOTES
Bed: 07  Expected date:   Expected time:   Means of arrival:   Comments:  Keith Krueger RN  01/11/22 0005

## 2022-01-14 ENCOUNTER — OFFICE VISIT (OUTPATIENT)
Dept: INTERNAL MEDICINE | Age: 52
End: 2022-01-14
Payer: MEDICAID

## 2022-01-14 VITALS
OXYGEN SATURATION: 96 % | DIASTOLIC BLOOD PRESSURE: 62 MMHG | TEMPERATURE: 97.7 F | WEIGHT: 172.9 LBS | HEART RATE: 97 BPM | RESPIRATION RATE: 20 BRPM | HEIGHT: 60 IN | SYSTOLIC BLOOD PRESSURE: 133 MMHG | BODY MASS INDEX: 33.95 KG/M2

## 2022-01-14 DIAGNOSIS — R35.0 BENIGN PROSTATIC HYPERPLASIA WITH URINARY FREQUENCY: ICD-10-CM

## 2022-01-14 DIAGNOSIS — N40.1 BENIGN PROSTATIC HYPERPLASIA WITH URINARY FREQUENCY: ICD-10-CM

## 2022-01-14 DIAGNOSIS — E78.2 MIXED HYPERLIPIDEMIA: ICD-10-CM

## 2022-01-14 DIAGNOSIS — K59.00 CONSTIPATION, UNSPECIFIED CONSTIPATION TYPE: Primary | ICD-10-CM

## 2022-01-14 DIAGNOSIS — D72.10 EOSINOPHILIA, UNSPECIFIED TYPE: ICD-10-CM

## 2022-01-14 DIAGNOSIS — K21.9 GASTROESOPHAGEAL REFLUX DISEASE WITHOUT ESOPHAGITIS: ICD-10-CM

## 2022-01-14 DIAGNOSIS — I10 ESSENTIAL HYPERTENSION: ICD-10-CM

## 2022-01-14 LAB — URINE CULTURE, ROUTINE: NORMAL

## 2022-01-14 PROCEDURE — 99214 OFFICE O/P EST MOD 30 MIN: CPT | Performed by: INTERNAL MEDICINE

## 2022-01-14 PROCEDURE — G8417 CALC BMI ABV UP PARAM F/U: HCPCS | Performed by: INTERNAL MEDICINE

## 2022-01-14 PROCEDURE — G8484 FLU IMMUNIZE NO ADMIN: HCPCS | Performed by: INTERNAL MEDICINE

## 2022-01-14 PROCEDURE — 99212 OFFICE O/P EST SF 10 MIN: CPT | Performed by: INTERNAL MEDICINE

## 2022-01-14 PROCEDURE — 3017F COLORECTAL CA SCREEN DOC REV: CPT | Performed by: INTERNAL MEDICINE

## 2022-01-14 PROCEDURE — 1036F TOBACCO NON-USER: CPT | Performed by: INTERNAL MEDICINE

## 2022-01-14 PROCEDURE — G8427 DOCREV CUR MEDS BY ELIG CLIN: HCPCS | Performed by: INTERNAL MEDICINE

## 2022-01-14 RX ORDER — AMOXICILLIN 250 MG
2 CAPSULE ORAL NIGHTLY
Qty: 60 TABLET | Refills: 3 | Status: SHIPPED | OUTPATIENT
Start: 2022-01-14

## 2022-01-14 RX ORDER — ATORVASTATIN CALCIUM 40 MG/1
40 TABLET, FILM COATED ORAL DAILY
Qty: 90 TABLET | Refills: 1 | Status: SHIPPED | OUTPATIENT
Start: 2022-01-14

## 2022-01-14 RX ORDER — PANTOPRAZOLE SODIUM 40 MG/1
TABLET, DELAYED RELEASE ORAL
Qty: 90 TABLET | Refills: 1 | Status: SHIPPED | OUTPATIENT
Start: 2022-01-14

## 2022-01-14 RX ORDER — TAMSULOSIN HCL 0.4 MG
0.4 CAPSULE ORAL DAILY
Qty: 30 CAPSULE | Refills: 3
Start: 2022-01-14

## 2022-01-14 RX ORDER — AMLODIPINE BESYLATE 5 MG/1
5 TABLET ORAL DAILY
Qty: 90 TABLET | Refills: 1 | Status: SHIPPED
Start: 2022-01-14 | End: 2022-09-14

## 2022-01-14 SDOH — ECONOMIC STABILITY: FOOD INSECURITY: WITHIN THE PAST 12 MONTHS, THE FOOD YOU BOUGHT JUST DIDN'T LAST AND YOU DIDN'T HAVE MONEY TO GET MORE.: NEVER TRUE

## 2022-01-14 SDOH — ECONOMIC STABILITY: INCOME INSECURITY: IN THE LAST 12 MONTHS, WAS THERE A TIME WHEN YOU WERE NOT ABLE TO PAY THE MORTGAGE OR RENT ON TIME?: NO

## 2022-01-14 SDOH — ECONOMIC STABILITY: FOOD INSECURITY: WITHIN THE PAST 12 MONTHS, YOU WORRIED THAT YOUR FOOD WOULD RUN OUT BEFORE YOU GOT MONEY TO BUY MORE.: NEVER TRUE

## 2022-01-14 SDOH — ECONOMIC STABILITY: HOUSING INSECURITY: IN THE LAST 12 MONTHS, HOW MANY PLACES HAVE YOU LIVED?: 2

## 2022-01-14 SDOH — ECONOMIC STABILITY: HOUSING INSECURITY
IN THE LAST 12 MONTHS, WAS THERE A TIME WHEN YOU DID NOT HAVE A STEADY PLACE TO SLEEP OR SLEPT IN A SHELTER (INCLUDING NOW)?: NO

## 2022-01-14 ASSESSMENT — LIFESTYLE VARIABLES: HOW OFTEN DO YOU HAVE A DRINK CONTAINING ALCOHOL: NEVER

## 2022-01-14 ASSESSMENT — PATIENT HEALTH QUESTIONNAIRE - PHQ9
SUM OF ALL RESPONSES TO PHQ QUESTIONS 1-9: 15
9. THOUGHTS THAT YOU WOULD BE BETTER OFF DEAD, OR OF HURTING YOURSELF: 0
5. POOR APPETITE OR OVEREATING: 2
1. LITTLE INTEREST OR PLEASURE IN DOING THINGS: 2
2. FEELING DOWN, DEPRESSED OR HOPELESS: 3
7. TROUBLE CONCENTRATING ON THINGS, SUCH AS READING THE NEWSPAPER OR WATCHING TELEVISION: 1
4. FEELING TIRED OR HAVING LITTLE ENERGY: 2
SUM OF ALL RESPONSES TO PHQ QUESTIONS 1-9: 15
3. TROUBLE FALLING OR STAYING ASLEEP: 1
8. MOVING OR SPEAKING SO SLOWLY THAT OTHER PEOPLE COULD HAVE NOTICED. OR THE OPPOSITE, BEING SO FIGETY OR RESTLESS THAT YOU HAVE BEEN MOVING AROUND A LOT MORE THAN USUAL: 1
6. FEELING BAD ABOUT YOURSELF - OR THAT YOU ARE A FAILURE OR HAVE LET YOURSELF OR YOUR FAMILY DOWN: 3
SUM OF ALL RESPONSES TO PHQ9 QUESTIONS 1 & 2: 5
SUM OF ALL RESPONSES TO PHQ QUESTIONS 1-9: 15
SUM OF ALL RESPONSES TO PHQ QUESTIONS 1-9: 15

## 2022-01-14 ASSESSMENT — SOCIAL DETERMINANTS OF HEALTH (SDOH): HOW HARD IS IT FOR YOU TO PAY FOR THE VERY BASICS LIKE FOOD, HOUSING, MEDICAL CARE, AND HEATING?: NOT HARD AT ALL

## 2022-01-14 NOTE — PROGRESS NOTES
[facility]    Attending Physician Statement  I have discussed the case, including pertinent history and exam findings with the resident physician. I agree with the assessment, plan and orders as documented by the resident. I have reviewed all pertinent PMHx, PSHx, FamHx, SocialHx, medications, and allergies and updated history as appropriate. Patient here for emergency room follow up. Abdominal pain noted. Imaging with significant stool burden. He had BM about 2x/week. May be SE of his psych meds. Will add magnesium citrate and reassess. Enlarged prostate on imaging with LUTS. UA negative. Start flomax, obtain PSA. May need Urology referral.    [place] of medical problems as per resident note. Duy Moat Mc, MD  1/14/2022 10:48 AM

## 2022-01-14 NOTE — PATIENT INSTRUCTIONS
· Take magnesium citrate one-time  · Follow-up in 3 weeks  · Continue taking MiraLAX  · Continue taking percolates daily  · Continue all other medications  · Start taking Flomax 0.4 mg daily  · Obtain laboratory values prior to next visit

## 2022-01-14 NOTE — PROGRESS NOTES
Mir Milner (:  1970) is a 46 y.o. male,Established patient, here for evaluation of the following chief complaint(s):  ED Follow-up (no abd at this present time) and Other (er stated taht his prostate might be enlarge unable to put a catheter in place)        ASSESSMENT/PLAN:  Abdominal pain secondary to constipation  · Will the ED imaging showed large stool burden  · Patient continues to have 1-2 bowel movements every 2 to 3 days  · No episodes of diarrhea  · Continue MiraLAX daily  · Give 1 dose of magnesium citrate  · Continue percolates  · Will need colonoscopy in the near future     Gastritis? · Given recent abdominal pain likely contributing factor  · Continue Protonix 40 mg daily  · Patient has never had an EGD in our records  · Will need EGD in near future    Hypertension  · Blood pressure well controlled today  · Amlodipine 5 mg    Severe recurrent depression  · Symptoms stable  · Managed by outside facility  · Continue trazodone, Abilify 50 mg, levothyroxine 150 mg extended release    BPH  · Enlarged prostate on CT abdomen  · Patient currently complaining of increased urination and difficulty urinating  · We will start Flomax  · PSA pending  · May need urology follow-up depending on symptoms and PSA    Return in about 3 weeks (around 2022). Subjective   SUBJECTIVE/OBJECTIVE:  HPI  Patient is a 28-year-old male with past medical history of brain tumors, severe recurrent depression, suicide attempt who presents to the clinic for follow-up on emergency room visit. Upon arrival to the emergency room he complained of a 2-day history of abdominal pain. He states no alleviating or exacerbating factors at that time. He did also feel constipated. Patient was given MiraLAX and discharged home. Since then patient has had his bowel movements regular. Patient states that he normally has a bowel movement 2 times every week. On physical exam patient's abdomen was distended.   Discussed with patient need for further laxatives. Patient agrees to take 1 dose of magnesium citrate. Also discussed need for colonoscopy and EGD given current constipation symptoms, colorectal cancer screening and prolonged gastric reflux. Remainder of review of systems was negative. Patient has no other complaints. Review of Systems     Constitutional: (-) Fever, (-) Chills (-) Weight gain (-) Weight loss (-)Fatigue (-)Day time sleepiness  Head: (-) Headache (-) Light headedness (-) Dizziness  Eyes: (-) Changes in vision (-) Blurry vision  Mouth: (-) difficulty swallowing. (-) pain with swallowing   Neck: (-) stiffness, (-) swelling and (-) pain. Respiratory: (-) SOB (-) cough. Cardiovascular: (-) chest pain (-) palpitations. GI:  (-) nausea, (-) vomiting, (-) diarrhea, (+) constipation, resolved  (+) abdomen pain resolved   Urology: (-) dysuria (-) polyuria  (-) straining with urination (-) urinary incontinence (-) Urinary retention   Musculoskeletal: (-) muscle weakness (+)Muscle pain (-) new joint pain (-) rash  (-) back pain  Hematology: (-) bruising (-) bleeding. Neurologic: (-) tingling, (-) numbness (-) focal neurological deficits. Objective   Physical Exam      Vitals: /62 (Site: Right Upper Arm, Position: Sitting, Cuff Size: Medium Adult)   Pulse 97   Temp 97.7 °F (36.5 °C) (Temporal)   Resp 20   Ht 5' (1.524 m)   Wt 172 lb 14.4 oz (78.4 kg)   SpO2 96%   BMI 33.77 kg/m²       · Constitutional: Alert, AaO x3. Follows commands. In no apparent distress. · Head: Normocephalic and atraumatic. · Eyes: Conjunctiva normal, (-) scleral icterus. Mucus membranes moist.  · Mouth: Mucus membranes moist. Oropharynx clear. No deviation of the tongue or uvula. Normal dentition. · Respiratory: Lungs clear to auscultation bilaterally. (-)  wheezes, (-)  rales, (-)  rhonchi. · Cardiovascular: RRR. (-)  murmurs, (-) gallops,  (-) rubs.  S1 and S2 were normal.   · GI:  Abdomen soft, non-tender, Mildy distended and full abdomen. (+) BS. (-)  rebound, (-) guarding, (-) rigidity. · Extremities: Warm and well perfused. (-) clubbing, (-) cyanosis. 2+ distal pulses. (-) peripheral edema. · Neurologic:  Cranial nerves II-XII grossly intact. No focal neurological deficits. On this date 1/14/2022 I have spent 25 minutes reviewing previous notes, test results and face to face with the patient discussing the diagnosis and importance of compliance with the treatment plan as well as documenting on the day of the visit. An electronic signature was used to authenticate this note.     --Walter Gan MD

## 2022-02-02 ENCOUNTER — TELEPHONE (OUTPATIENT)
Dept: INTERNAL MEDICINE | Age: 52
End: 2022-02-02

## 2022-02-02 NOTE — TELEPHONE ENCOUNTER
----- Message from Eileen Savannah sent at 2/2/2022  3:44 PM EST -----  Subject: Appointment Request    Reason for Call: Routine Existing Condition Follow Up    QUESTIONS  Type of Appointment? Established Patient  Reason for appointment request? Available appointments did not meet   patient need  Additional Information for Provider? screen green / Sandor would like to   reschedule 1 month follow up appointment   ---------------------------------------------------------------------------  --------------  [address]  What is the best way for the office to contact you? OK to leave message on   voicemail  Preferred Call Back Phone Number? [phone]  ---------------------------------------------------------------------------  --------------  SCRIPT ANSWERS  Relationship to Patient? Self  Have your symptoms changed? No  Is this follow up request related to routine Diabetes Management? No  Have you been diagnosed with, awaiting test results for, or told that you   are suspected of having COVID-19 (Coronavirus)? (If patient has tested   negative or was tested as a requirement for work, school, or travel and   not based on symptoms, answer no)? No  Within the past two weeks have you developed any of the following symptoms   (answer no if symptoms have been present longer than 2 weeks or began   more than 2 weeks ago)? Fever or Chills, Cough, Shortness of breath or   difficulty breathing, Loss of taste or smell, Sore throat, Nasal   congestion, Sneezing or runny nose, Fatigue or generalized body aches   (answer no if pain is specific to a body part e.g. back pain), Diarrhea,   Headache? No  Have you had close contact with someone with COVID-19 in the last 14 days? No  (Service Expert  click yes below to proceed with [employer] As Usual   Scheduling)?  Yes

## 2022-02-10 ENCOUNTER — TELEPHONE (OUTPATIENT)
Dept: INTERNAL MEDICINE | Age: 52
End: 2022-02-10

## 2022-02-10 NOTE — TELEPHONE ENCOUNTER
States someone called him yesterday about labs. He has paperwork to have labs done. Will have labs drawn before his appointment in April. If there is anything more, please have the person who called him call him back.   Nohemi Coronado LPN

## 2022-02-18 ENCOUNTER — TELEPHONE (OUTPATIENT)
Dept: INTERNAL MEDICINE | Age: 52
End: 2022-02-18

## 2022-02-18 NOTE — TELEPHONE ENCOUNTER
Patient complaining of Right pain and numbness for 3 to 4 weeks . Patient wants to be seen in the office.  Patient given an Urgent care visit 2/21/22 and 130pm

## 2022-02-18 NOTE — TELEPHONE ENCOUNTER
----- Message from Corinne Candelario sent at 2/18/2022 11:10 AM EST -----  Subject: Message to Provider    QUESTIONS  Information for Provider? Patient received a copy of his lab orders but   misplaced it. Please mail him another copy to? [place], [address] [facility],   [place]. Please contact patient when it is sent out. Please text   if patient does not answer. Thank you.  ---------------------------------------------------------------------------  --------------  CALL BACK INFO  What is the best way for the office to contact you? OK to leave message on   voicemail  Preferred Call Back Phone Number? [phone]  ---------------------------------------------------------------------------  --------------  SCRIPT ANSWERS  Relationship to Patient?  Self

## 2022-02-21 ENCOUNTER — OFFICE VISIT (OUTPATIENT)
Dept: INTERNAL MEDICINE | Age: 52
End: 2022-02-21
Payer: MEDICAID

## 2022-02-21 VITALS
WEIGHT: 173.7 LBS | SYSTOLIC BLOOD PRESSURE: 129 MMHG | HEIGHT: 60 IN | TEMPERATURE: 97.3 F | DIASTOLIC BLOOD PRESSURE: 73 MMHG | OXYGEN SATURATION: 95 % | HEART RATE: 91 BPM | RESPIRATION RATE: 16 BRPM | BODY MASS INDEX: 34.1 KG/M2

## 2022-02-21 DIAGNOSIS — M79.641 PAIN OF RIGHT HAND: Primary | ICD-10-CM

## 2022-02-21 PROCEDURE — 99213 OFFICE O/P EST LOW 20 MIN: CPT | Performed by: INTERNAL MEDICINE

## 2022-02-21 PROCEDURE — 99212 OFFICE O/P EST SF 10 MIN: CPT | Performed by: INTERNAL MEDICINE

## 2022-02-21 PROCEDURE — G8484 FLU IMMUNIZE NO ADMIN: HCPCS | Performed by: INTERNAL MEDICINE

## 2022-02-21 PROCEDURE — 1036F TOBACCO NON-USER: CPT | Performed by: INTERNAL MEDICINE

## 2022-02-21 PROCEDURE — 3017F COLORECTAL CA SCREEN DOC REV: CPT | Performed by: INTERNAL MEDICINE

## 2022-02-21 PROCEDURE — G8417 CALC BMI ABV UP PARAM F/U: HCPCS | Performed by: INTERNAL MEDICINE

## 2022-02-21 PROCEDURE — G8427 DOCREV CUR MEDS BY ELIG CLIN: HCPCS | Performed by: INTERNAL MEDICINE

## 2022-02-21 RX ORDER — ACETAMINOPHEN 500 MG
500 TABLET ORAL EVERY 8 HOURS PRN
Qty: 60 TABLET | Refills: 0 | Status: CANCELLED | COMMUNITY
Start: 2022-02-21

## 2022-02-21 RX ORDER — IBUPROFEN 400 MG/1
400 TABLET ORAL EVERY 8 HOURS PRN
Qty: 30 TABLET | Refills: 0 | Status: SHIPPED | OUTPATIENT
Start: 2022-02-21

## 2022-02-21 RX ORDER — HYDROCORTISONE 0.5 %
CREAM (GRAM) TOPICAL 3 TIMES DAILY PRN
Qty: 1 EACH | Refills: 0 | Status: SHIPPED | OUTPATIENT
Start: 2022-02-21

## 2022-02-21 NOTE — PATIENT INSTRUCTIONS
Discharge Instructions:   Be compliant with medications   Start taking ibuprofen 400 mg every 8 hours as needed for pain   Please apply Bengay to right hand three times daily as needed for pain   Please apply wrist splint daily specially while carrying heavy objects   Please have labs done before coming to next visit   Follow up in 4/11/22, or sooner if symptoms get worse or do not resolve    Patient Education        Hand Pain: Care Instructions  Your Care Instructions     Common causes of hand pain are overuse and injuries, such as might happen during sports or home repair projects. Everyday wear and tear, especially as you get older, also can cause hand pain. Most minor hand injuries will heal on their own, and home treatment is usually all you need to do. If you have sudden and severe pain, you may need tests and treatment. Follow-up care is a key part of your treatment and safety. Be sure to make and go to all appointments, and call your doctor if you are having problems. It's also a good idea to know your test results and keep a list of the medicines you take. How can you care for yourself at home? · Take pain medicines exactly as directed. ? If the doctor gave you a prescription medicine for pain, take it as prescribed. ? If you are not taking a prescription pain medicine, ask your doctor if you can take an over-the-counter medicine. · Rest and protect your hand. Take a break from any activity that may cause pain. · Put ice or a cold pack on your hand for 10 to 20 minutes at a time. Put a thin cloth between the ice and your skin. · Prop up the sore hand on a pillow when you ice it or anytime you sit or lie down during the next 3 days. Try to keep it above the level of your heart. This will help reduce swelling. · If your doctor recommends a sling, splint, or elastic bandage to support your hand, wear it as directed. When should you call for help?    Call 911 anytime you think you may need emergency care. For example, call if:    · Your hand turns cool or pale or changes color. Call your doctor now or seek immediate medical care if:    · You cannot move your hand.     · Your hand pops, moves out of its normal position, and then returns to its normal position.     · You have signs of infection, such as:  ? Increased pain, swelling, warmth, or redness. ? Red streaks leading from the sore area. ? Pus draining from a place on your hand. ? A fever.     · Your hand feels numb or tingly. Watch closely for changes in your health, and be sure to contact your doctor if:    · Your hand feels unstable when you try to use it.     · You do not get better as expected.     · You have any new symptoms, such as swelling.     · Bruises from an injury to your hand last longer than 2 weeks. Where can you learn more? Go to [email or web address] org and sign in to your [employer] account. Enter R273 in the [employer] box to learn more about \"Hand Pain: Care Instructions. \"     If you do not have an account, please click on the \"Sign Up Now\" link. Current as of: July 1, 2021               Content Version: 13.1  © 2006-2021 [employer]. Care instructions adapted under license by [facility] ([facility]). If you have questions about a medical condition or this instruction, always ask your healthcare professional. [place] [address] any warranty or liability for your use of this information.

## 2022-02-21 NOTE — PROGRESS NOTES
[facility]  Internal Medicine Residency Program  ACC Note      SUBJECTIVE:  CC: had concerns including Numbness (right hand pain and numness for 3-4 weeks). Olman Guzman presented with right hand pain for one month    He reports right hand pain, radiating to the upper arm, dull intermittent pain, 6/10 in severity. He reports pain aggravates by carrying weight. He did not try any pain medications for this. He is able to carry things with the right hand. He thinks it could be related to the way he is sleeping as he is sleeping on a couch on his left side most of the time. He lies down most of the time. He denies any headache, light headedness, dizziness, neck pain, shoulder pain. He also reports tingling, numbness of right hand intermittently. Review Of Systems:  General: no fevers, chills, weight loss or gain. Ears/Nose/Throat: no hearing loss, tinnitus, vertigo, nosebleed, nasal congestion, rhinorrhea, sore throat  Respiratory: no cough, pleuritic chest pain, dyspnea, or wheezing  Cardiovascular: no chest pain, angina, dyspnea on exertion, orthopnea, PND, palpitations, or claudication  Gastrointestinal: no nausea, vomiting, heartburn, diarrhea, constipation, abdominal pain, hematochezia or melena  Genitourinary: no urinary urgency, frequency, dysuria, nocturia, hesitancy, or incontinence  Musculoskeletal: no arthritis, arthralgia, myalgia, weakness, or morning stiffness  Skin: no abnormal pigmentation, rash, itching, masses, hair or nail changes    Outpatient Medications Marked as Taking for the 2/21/22 encounter (Office Visit) with Josiah Martin MD   Medication Sig Dispense Refill    ibuprofen (ADVIL;MOTRIN) 400 MG tablet Take 1 tablet by mouth every 8 hours as needed for Pain 30 tablet 0    methyl salicylate-menthol (REJI LOTT GREASELESS) 10-15 % CREA Apply topically 3 times daily as needed for Pain Apply to right hand three times daily as needed for pain 1 each 0    Misc. Devices MISC 1 each by Does not apply route once for 1 dose Wrist splint for right hand 1 each 0    ARIPiprazole (ABILIFY) 15 MG tablet          I have reviewed all pertinent PMHx, PSHx, FamHx, SocialHx, medications, and allergies and updated history as appropriate. OBJECTIVE:    VS: /73 (Site: Right Upper Arm, Position: Sitting, Cuff Size: Medium Adult)   Pulse 91   Temp 97.3 °F (36.3 °C) (Infrared)   Resp 16   Ht 5' (1.524 m)   Wt 173 lb 11.2 oz (78.8 kg)   SpO2 95% Comment: at rest on room air  BMI 33.92 kg/m²   General appearance: Alert, Awake, Oriented times 3, no distress  Lungs: Lungs clear to auscultation bilaterally. No rhonchi, crackles or wheezes  Heart: S1 S2  Regular rate and rhythm. No rub, murmur or gallop  Abdomen: Abdomen soft, non-tender. non-distended BS normal. No masses, organomegaly, no guarding rebound or rigidity.   Extremities: No edema, Peripheral pulses palpable 2/4  Neuro: alert, awake, no gross focal neurologic deficit  Musculoskeletal: right ulnar sided tenderness    ASSESSMENT/PLAN:    Right hand pain likely ulnar nerve entrapment/ musculoskeletal  No restriction of movement, sensory loss noted  Reports tenderness on ulnar side of hand on palpation  Ibuprofen 400 mg three times daily as needed for pain after meal  Bengay to right hand three times daily as needed  Advised to not to lie down too long on the right side  Advised to come to the clinic sooner if pain does not improve or if symptoms get worse    I have reviewed my findings and recommendations with Mary Jo Edwards and Dr Osman Ray MD PGY-3   2/21/2022 1:47 PM

## 2022-02-21 NOTE — PROGRESS NOTES
[facility]  [facility]    Attending Physician Statement  I have discussed the case, including pertinent history and exam findings with the resident physician. I agree with the assessment, plan and orders as documented by the resident. I have reviewed all pertinent PMHx, PSHx, FamHx, SocialHx, medications, and allergies and updated history as appropriate. R hand pain for 1 month. Ulnar side with some radiation up to arm. Pain worse with carrying objects. Strength is normal with normal sensation. Some tenderness to palpation of the ulnar surface of the R hand. Agree with initiation of conservative therapy with NSAIDs and change in positioning during sleep. Wrist splint may help to keep hand in a neutral position during sleep. If no improvement, can check an X-ray of the hand. Remainder of medical problems as per resident note.     Cara Jaramillo MD  2/21/2022 1:36 PM

## 2022-04-12 ENCOUNTER — TELEPHONE (OUTPATIENT)
Dept: INTERNAL MEDICINE | Age: 52
End: 2022-04-12

## 2022-04-12 NOTE — LETTER
[id number] [facility] Internal Medicine Office   [address] [facility], [address] [place] [address]  Phone: [phone]  Fax: [phone]      4/12/2022   Ana Louis  [address]       Dear Joy Ware: We are sorry you missed your appointment with Dr. Ranjit Vail on 4/11/2022. Your health and follow-up medical care are important to us. Please call our office as soon as possible at [phone] so that we may reschedule your appointment. Please note that if you have three cancelled appointments or do not show for three consecutive appointments, no medication refills or paperwork requests will be honored until an appointment is scheduled and completed. If you have already rescheduled your appointment, please disregard this letter. We look forward to seeing you soon.        Sincerely,         Dejuan Gates RN

## 2022-07-24 ENCOUNTER — APPOINTMENT (OUTPATIENT)
Dept: GENERAL RADIOLOGY | Age: 52
End: 2022-07-24
Payer: MEDICAID

## 2022-07-24 ENCOUNTER — HOSPITAL ENCOUNTER (EMERGENCY)
Age: 52
Discharge: LEFT BEFORE DISCHARGE | End: 2022-07-25
Payer: MEDICAID

## 2022-07-24 VITALS
OXYGEN SATURATION: 95 % | SYSTOLIC BLOOD PRESSURE: 156 MMHG | TEMPERATURE: 97.8 F | DIASTOLIC BLOOD PRESSURE: 93 MMHG | HEART RATE: 109 BPM | RESPIRATION RATE: 16 BRPM

## 2022-07-24 LAB
ANION GAP SERPL CALCULATED.3IONS-SCNC: 13 MMOL/L (ref 7–16)
BASOPHILS ABSOLUTE: 0.06 E9/L (ref 0–0.2)
BASOPHILS RELATIVE PERCENT: 0.7 % (ref 0–2)
BUN BLDV-MCNC: 8 MG/DL (ref 6–20)
CALCIUM SERPL-MCNC: 8.8 MG/DL (ref 8.6–10.2)
CHLORIDE BLD-SCNC: 105 MMOL/L (ref 98–107)
CO2: 24 MMOL/L (ref 22–29)
CREAT SERPL-MCNC: 1.1 MG/DL (ref 0.7–1.2)
EOSINOPHILS ABSOLUTE: 0.58 E9/L (ref 0.05–0.5)
EOSINOPHILS RELATIVE PERCENT: 6.9 % (ref 0–6)
GFR AFRICAN AMERICAN: >60
GFR NON-AFRICAN AMERICAN: >60 ML/MIN/1.73
GLUCOSE BLD-MCNC: 132 MG/DL (ref 74–99)
HCT VFR BLD CALC: 42.3 % (ref 37–54)
HEMOGLOBIN: 14.3 G/DL (ref 12.5–16.5)
IMMATURE GRANULOCYTES #: 0.02 E9/L
IMMATURE GRANULOCYTES %: 0.2 % (ref 0–5)
LYMPHOCYTES ABSOLUTE: 1.79 E9/L (ref 1.5–4)
LYMPHOCYTES RELATIVE PERCENT: 21.2 % (ref 20–42)
MCH RBC QN AUTO: 26.4 PG (ref 26–35)
MCHC RBC AUTO-ENTMCNC: 33.8 % (ref 32–34.5)
MCV RBC AUTO: 78 FL (ref 80–99.9)
MONOCYTES ABSOLUTE: 0.47 E9/L (ref 0.1–0.95)
MONOCYTES RELATIVE PERCENT: 5.6 % (ref 2–12)
NEUTROPHILS ABSOLUTE: 5.51 E9/L (ref 1.8–7.3)
NEUTROPHILS RELATIVE PERCENT: 65.4 % (ref 43–80)
PDW BLD-RTO: 14.4 FL (ref 11.5–15)
PLATELET # BLD: 245 E9/L (ref 130–450)
PMV BLD AUTO: 9.7 FL (ref 7–12)
POTASSIUM SERPL-SCNC: 4.2 MMOL/L (ref 3.5–5)
PRO-BNP: 29 PG/ML (ref 0–125)
RBC # BLD: 5.42 E12/L (ref 3.8–5.8)
SODIUM BLD-SCNC: 142 MMOL/L (ref 132–146)
TROPONIN, HIGH SENSITIVITY: <6 NG/L (ref 0–11)
WBC # BLD: 8.4 E9/L (ref 4.5–11.5)

## 2022-07-24 PROCEDURE — 84484 ASSAY OF TROPONIN QUANT: CPT

## 2022-07-24 PROCEDURE — 4500000002 HC ER NO CHARGE

## 2022-07-24 PROCEDURE — 83880 ASSAY OF NATRIURETIC PEPTIDE: CPT

## 2022-07-24 PROCEDURE — 85025 COMPLETE CBC W/AUTO DIFF WBC: CPT

## 2022-07-24 PROCEDURE — 80048 BASIC METABOLIC PNL TOTAL CA: CPT

## 2022-07-24 PROCEDURE — 71046 X-RAY EXAM CHEST 2 VIEWS: CPT

## 2022-07-24 PROCEDURE — 93005 ELECTROCARDIOGRAM TRACING: CPT | Performed by: PHYSICIAN ASSISTANT

## 2022-07-24 PROCEDURE — 36415 COLL VENOUS BLD VENIPUNCTURE: CPT

## 2022-07-24 NOTE — ED NOTES
FIRST PROVIDER CONTACT ASSESSMENT NOTE       Department of Emergency Medicine                 First Provider Note            22  2:26 PM EDT    Date of Encounter: No admission date for patient encounter. Patient Name: Araceli Cook  : 1970  MRN: [id number]    Chief Complaint: No chief complaint on file. History of Present Illness:   Araceli Cook is a 46 y.o. male who presents to the ED for chest pain x 2 weeks. Denies cough/fevers. Focused Physical Exam:  VS:    ED Triage Vitals [22 1423]   BP Temp Temp Source Heart Rate Resp SpO2 Height Weight   -- 97.8 °F (36.6 °C) Temporal (!) 108 -- 95 % -- --        Physical Ex: Constitutional: Alert and non-toxic. Medical History:  has a past medical history of Basal cell carcinoma of skin, Cancer ([facility] [address] Depression, Hypertension, and Severe episode of recurrent major depressive disorder, without psychotic features ([facility] [address] Surgical History:  has a past surgical history that includes Brain tumor excision (); Appendectomy; Mandible surgery; and other surgical history (Right, 2014). Social History:  reports that he has quit smoking. He has a 5.00 pack-year smoking history. He has never used smokeless tobacco. He reports that he does not drink alcohol and does not use drugs. Family History: family history includes Alcohol Abuse in his father; Cancer in his father; Stroke in his mother. Allergies: Patient has no known allergies.      Initial Plan of Care: Initiate Treatment-Testing, Proceed toTreatment Area When Bed Available for ED Attending/MLP to Continue Care      ---END OF FIRST PROVIDER CONTACT ASSESSMENT NOTE---  Electronically signed by NAZARIO Shaw   DD: 22       NAZARIO Shaw  22 [id number]

## 2022-07-25 LAB
EKG ATRIAL RATE: 103 BPM
EKG P AXIS: 72 DEGREES
EKG P-R INTERVAL: 146 MS
EKG Q-T INTERVAL: 332 MS
EKG QRS DURATION: 74 MS
EKG QTC CALCULATION (BAZETT): 434 MS
EKG R AXIS: 57 DEGREES
EKG T AXIS: 46 DEGREES
EKG VENTRICULAR RATE: 103 BPM

## 2022-07-25 NOTE — ED NOTES
Problem: Altered Mood, Depressive Behavior:  Goal: Able to verbalize and/or display a decrease in depressive symptoms  Description  Able to verbalize and/or display a decrease in depressive symptoms  Outcome: Ongoing     Problem: Altered Mood, Psychotic Behavior:  Goal: Able to verbalize decrease in frequency and intensity of hallucinations  Description  Able to verbalize decrease in frequency and intensity of hallucinations  5/31/2019 1820 by Kandis Thomas RN  Outcome: Ongoing   Pt remains withdrawn to his room. On approach his affect is blunted and speech is minimal and monotone. Does not offer conversation but briefly answers questions. Denies SI and HI but does admit to hearing voices that tell him that he is not good and bad. Encouraged him to try and go out on the unit but pt states he has no interest in talking to peers. Pt's name called for rounding and additional testing. No response. Will call again on next round.       Jerica May  07/25/22 4402

## 2022-07-25 NOTE — ED NOTES
At this time this nurse attempted to call patient back for labs and vital patient and he is no longer in the department     Madonna Lima RN  07/25/22 9705

## 2022-09-13 ENCOUNTER — TELEPHONE (OUTPATIENT)
Dept: INTERNAL MEDICINE | Age: 52
End: 2022-09-13

## 2022-09-13 DIAGNOSIS — I10 ESSENTIAL HYPERTENSION: ICD-10-CM

## 2022-09-13 NOTE — TELEPHONE ENCOUNTER
Patient needs an office visit. Patient's phone number is invalid. Called patient's Aunt who stated she will have him call the office for an appt and a valid number.

## 2022-09-14 RX ORDER — AMLODIPINE BESYLATE 5 MG/1
5 TABLET ORAL DAILY
Qty: 30 TABLET | Refills: 0 | Status: SHIPPED | OUTPATIENT
Start: 2022-09-14

## 2022-11-06 ENCOUNTER — APPOINTMENT (OUTPATIENT)
Dept: GENERAL RADIOLOGY | Age: 52
DRG: 247 | End: 2022-11-06
Payer: MEDICAID

## 2022-11-06 LAB
ALBUMIN SERPL-MCNC: 4.2 G/DL (ref 3.5–5.2)
ALP BLD-CCNC: 147 U/L (ref 40–129)
ALT SERPL-CCNC: 11 U/L (ref 0–40)
ANION GAP SERPL CALCULATED.3IONS-SCNC: 12 MMOL/L (ref 7–16)
AST SERPL-CCNC: 12 U/L (ref 0–39)
BASOPHILS ABSOLUTE: 0.1 E9/L (ref 0–0.2)
BASOPHILS RELATIVE PERCENT: 0.9 % (ref 0–2)
BILIRUB SERPL-MCNC: 0.5 MG/DL (ref 0–1.2)
BUN BLDV-MCNC: 11 MG/DL (ref 6–20)
CALCIUM SERPL-MCNC: 9.4 MG/DL (ref 8.6–10.2)
CHLORIDE BLD-SCNC: 100 MMOL/L (ref 98–107)
CO2: 25 MMOL/L (ref 22–29)
CREAT SERPL-MCNC: 1 MG/DL (ref 0.7–1.2)
EOSINOPHILS ABSOLUTE: 0.61 E9/L (ref 0.05–0.5)
EOSINOPHILS RELATIVE PERCENT: 5.5 % (ref 0–6)
GFR SERPL CREATININE-BSD FRML MDRD: >60 ML/MIN/1.73
GLUCOSE BLD-MCNC: 139 MG/DL (ref 74–99)
HCT VFR BLD CALC: 46.9 % (ref 37–54)
HEMOGLOBIN: 15.8 G/DL (ref 12.5–16.5)
IMMATURE GRANULOCYTES #: 0.04 E9/L
IMMATURE GRANULOCYTES %: 0.4 % (ref 0–5)
LIPASE: 39 U/L (ref 13–60)
LYMPHOCYTES ABSOLUTE: 2.33 E9/L (ref 1.5–4)
LYMPHOCYTES RELATIVE PERCENT: 21 % (ref 20–42)
MCH RBC QN AUTO: 27.1 PG (ref 26–35)
MCHC RBC AUTO-ENTMCNC: 33.7 % (ref 32–34.5)
MCV RBC AUTO: 80.4 FL (ref 80–99.9)
MONOCYTES ABSOLUTE: 0.83 E9/L (ref 0.1–0.95)
MONOCYTES RELATIVE PERCENT: 7.5 % (ref 2–12)
NEUTROPHILS ABSOLUTE: 7.19 E9/L (ref 1.8–7.3)
NEUTROPHILS RELATIVE PERCENT: 64.7 % (ref 43–80)
PDW BLD-RTO: 13.7 FL (ref 11.5–15)
PLATELET # BLD: 383 E9/L (ref 130–450)
PMV BLD AUTO: 9.8 FL (ref 7–12)
POTASSIUM SERPL-SCNC: 3.8 MMOL/L (ref 3.5–5)
RBC # BLD: 5.83 E12/L (ref 3.8–5.8)
SODIUM BLD-SCNC: 137 MMOL/L (ref 132–146)
TOTAL PROTEIN: 7.6 G/DL (ref 6.4–8.3)
TROPONIN, HIGH SENSITIVITY: 6 NG/L (ref 0–11)
WBC # BLD: 11.1 E9/L (ref 4.5–11.5)

## 2022-11-06 PROCEDURE — 84484 ASSAY OF TROPONIN QUANT: CPT

## 2022-11-06 PROCEDURE — 83690 ASSAY OF LIPASE: CPT

## 2022-11-06 PROCEDURE — 85025 COMPLETE CBC W/AUTO DIFF WBC: CPT

## 2022-11-06 PROCEDURE — 83605 ASSAY OF LACTIC ACID: CPT

## 2022-11-06 PROCEDURE — 99285 EMERGENCY DEPT VISIT HI MDM: CPT

## 2022-11-06 PROCEDURE — 93005 ELECTROCARDIOGRAM TRACING: CPT | Performed by: PHYSICIAN ASSISTANT

## 2022-11-06 PROCEDURE — 80053 COMPREHEN METABOLIC PANEL: CPT

## 2022-11-06 PROCEDURE — 96374 THER/PROPH/DIAG INJ IV PUSH: CPT

## 2022-11-07 ENCOUNTER — APPOINTMENT (OUTPATIENT)
Dept: GENERAL RADIOLOGY | Age: 52
DRG: 247 | End: 2022-11-07
Payer: MEDICAID

## 2022-11-07 ENCOUNTER — APPOINTMENT (OUTPATIENT)
Dept: CT IMAGING | Age: 52
DRG: 247 | End: 2022-11-07
Payer: MEDICAID

## 2022-11-07 ENCOUNTER — HOSPITAL ENCOUNTER (INPATIENT)
Age: 52
LOS: 2 days | Discharge: HOME | DRG: 247 | End: 2022-11-09
Attending: EMERGENCY MEDICINE | Admitting: SURGERY
Payer: MEDICAID

## 2022-11-07 DIAGNOSIS — K56.609 SMALL BOWEL OBSTRUCTION (HCC): Primary | ICD-10-CM

## 2022-11-07 LAB
EKG ATRIAL RATE: 124 BPM
EKG P AXIS: 118 DEGREES
EKG P-R INTERVAL: 138 MS
EKG Q-T INTERVAL: 318 MS
EKG QRS DURATION: 76 MS
EKG QTC CALCULATION (BAZETT): 456 MS
EKG R AXIS: 167 DEGREES
EKG T AXIS: 143 DEGREES
EKG VENTRICULAR RATE: 124 BPM
LACTIC ACID: 2.1 MMOL/L (ref 0.5–2.2)

## 2022-11-07 PROCEDURE — 6370000000 HC RX 637 (ALT 250 FOR IP)

## 2022-11-07 PROCEDURE — 6360000004 HC RX CONTRAST MEDICATION: Performed by: RADIOLOGY

## 2022-11-07 PROCEDURE — 74018 RADEX ABDOMEN 1 VIEW: CPT

## 2022-11-07 PROCEDURE — 2580000003 HC RX 258

## 2022-11-07 PROCEDURE — 93010 ELECTROCARDIOGRAM REPORT: CPT | Performed by: INTERNAL MEDICINE

## 2022-11-07 PROCEDURE — 6360000002 HC RX W HCPCS

## 2022-11-07 PROCEDURE — C9113 INJ PANTOPRAZOLE SODIUM, VIA: HCPCS

## 2022-11-07 PROCEDURE — 0D9670Z DRAINAGE OF STOMACH WITH DRAINAGE DEVICE, VIA NATURAL OR ARTIFICIAL OPENING: ICD-10-PCS | Performed by: RADIOLOGY

## 2022-11-07 PROCEDURE — 6360000002 HC RX W HCPCS: Performed by: EMERGENCY MEDICINE

## 2022-11-07 PROCEDURE — 1200000000 HC SEMI PRIVATE

## 2022-11-07 PROCEDURE — 74177 CT ABD & PELVIS W/CONTRAST: CPT

## 2022-11-07 PROCEDURE — 71045 X-RAY EXAM CHEST 1 VIEW: CPT

## 2022-11-07 RX ORDER — ONDANSETRON 2 MG/ML
4 INJECTION INTRAMUSCULAR; INTRAVENOUS ONCE
Status: COMPLETED | OUTPATIENT
Start: 2022-11-07 | End: 2022-11-07

## 2022-11-07 RX ORDER — SODIUM CHLORIDE 9 MG/ML
INJECTION, SOLUTION INTRAVENOUS PRN
Status: DISCONTINUED | OUTPATIENT
Start: 2022-11-07 | End: 2022-11-09 | Stop reason: HOSPADM

## 2022-11-07 RX ORDER — ENOXAPARIN SODIUM 100 MG/ML
40 INJECTION SUBCUTANEOUS DAILY
Status: DISCONTINUED | OUTPATIENT
Start: 2022-11-08 | End: 2022-11-09 | Stop reason: HOSPADM

## 2022-11-07 RX ORDER — SODIUM CHLORIDE 0.9 % (FLUSH) 0.9 %
10 SYRINGE (ML) INJECTION EVERY 12 HOURS SCHEDULED
Status: DISCONTINUED | OUTPATIENT
Start: 2022-11-07 | End: 2022-11-09 | Stop reason: HOSPADM

## 2022-11-07 RX ORDER — PANTOPRAZOLE SODIUM 40 MG/10ML
40 INJECTION, POWDER, LYOPHILIZED, FOR SOLUTION INTRAVENOUS ONCE
Status: COMPLETED | OUTPATIENT
Start: 2022-11-07 | End: 2022-11-07

## 2022-11-07 RX ORDER — ONDANSETRON 2 MG/ML
4 INJECTION INTRAMUSCULAR; INTRAVENOUS EVERY 6 HOURS PRN
Status: DISCONTINUED | OUTPATIENT
Start: 2022-11-07 | End: 2022-11-09 | Stop reason: HOSPADM

## 2022-11-07 RX ORDER — SODIUM CHLORIDE 0.9 % (FLUSH) 0.9 %
10 SYRINGE (ML) INJECTION PRN
Status: DISCONTINUED | OUTPATIENT
Start: 2022-11-07 | End: 2022-11-09 | Stop reason: HOSPADM

## 2022-11-07 RX ORDER — SODIUM CHLORIDE, SODIUM LACTATE, POTASSIUM CHLORIDE, CALCIUM CHLORIDE 600; 310; 30; 20 MG/100ML; MG/100ML; MG/100ML; MG/100ML
INJECTION, SOLUTION INTRAVENOUS CONTINUOUS
Status: DISCONTINUED | OUTPATIENT
Start: 2022-11-07 | End: 2022-11-07 | Stop reason: SDUPTHER

## 2022-11-07 RX ORDER — ONDANSETRON 4 MG/1
4 TABLET, ORALLY DISINTEGRATING ORAL EVERY 8 HOURS PRN
Status: DISCONTINUED | OUTPATIENT
Start: 2022-11-07 | End: 2022-11-09 | Stop reason: HOSPADM

## 2022-11-07 RX ORDER — SODIUM CHLORIDE, SODIUM LACTATE, POTASSIUM CHLORIDE, CALCIUM CHLORIDE 600; 310; 30; 20 MG/100ML; MG/100ML; MG/100ML; MG/100ML
INJECTION, SOLUTION INTRAVENOUS CONTINUOUS
Status: DISCONTINUED | OUTPATIENT
Start: 2022-11-07 | End: 2022-11-09

## 2022-11-07 RX ADMIN — PANTOPRAZOLE SODIUM 40 MG: 40 INJECTION, POWDER, FOR SOLUTION INTRAVENOUS at 11:23

## 2022-11-07 RX ADMIN — SODIUM CHLORIDE, POTASSIUM CHLORIDE, SODIUM LACTATE AND CALCIUM CHLORIDE: 600; 310; 30; 20 INJECTION, SOLUTION INTRAVENOUS at 21:00

## 2022-11-07 RX ADMIN — ONDANSETRON 4 MG: 2 INJECTION INTRAMUSCULAR; INTRAVENOUS at 20:38

## 2022-11-07 RX ADMIN — LIDOCAINE HYDROCHLORIDE: 20 SOLUTION ORAL; TOPICAL at 11:23

## 2022-11-07 RX ADMIN — IOPAMIDOL 75 ML: 755 INJECTION, SOLUTION INTRAVENOUS at 12:13

## 2022-11-07 ASSESSMENT — ENCOUNTER SYMPTOMS
SHORTNESS OF BREATH: 0
CONSTIPATION: 1
DIARRHEA: 0
BACK PAIN: 0
SINUS PAIN: 0
EYE PAIN: 0
NAUSEA: 0
VOMITING: 0
COUGH: 0
ABDOMINAL PAIN: 0

## 2022-11-07 NOTE — ED PROVIDER NOTES
ATTENDING PROVIDER ATTESTATION:     Zeynep Mackay presented to the emergency department for evaluation of Abdominal Pain (Pt to ED stating he has been burping more than normal x1 day. Pt c/o epigastric pain, -nausea, -vomiting, -SOB, -CP. Pt arrives A&Ox4, ambulatory, drinking soda in triage. Pt states last BM 1 week ago which is normal for him. )   and was initially evaluated by the Medical Resident. See Original ED Note for H&P and ED course above. I have reviewed and discussed the case, including pertinent history (medical, surgical, family and social) and exam findings with the Medical Resident assigned to Zeynep Mackay. I have personally performed and/or participated in the history, exam, medical decision making, and procedures and agree with all pertinent clinical information and any additional changes or corrections are noted below in my assessment and plan. I have discussed this patient in detail with the resident, and provided the instruction and education,       I have reviewed my findings and recommendations with the assigned Medical Resident, Zeynep Mackay and members of family present at the time of disposition. I have performed a history and physical examination of this patient and directly supervised the resident caring for this patient      History of Present Illness:    Presents to the ED for abdominal pain/bloating with belching, beginning yesterday. The complaint has been constant, moderate in severity, and worsened by nothing. Patient reports he feels bloated and is belching. He reports that he had some abdominal pain as well. He reports it was epigastric. No nausea or vomiting. Positive constipation. He says his last bowel meant was 1 week ago. He does report this is normal for him. No fevers or chills. No chest pain or shortness of breath. He has a history of an appendectomy. He denies any other complaints.       Review of Systems:   A complete review of systems was performed and pertinent positives and negatives are stated within HPI, all other systems reviewed and are negative.    --------------------------------------------- PAST HISTORY ---------------------------------------------  Past Medical History:  has a past medical history of Basal cell carcinoma of skin, Cancer ([facility] [address] Depression, Hypertension, and Severe episode of recurrent major depressive disorder, without psychotic features ([facility] [address] Past Surgical History:  has a past surgical history that includes Brain tumor excision (1972); Appendectomy; Mandible surgery; and other surgical history (Right, 6/11/2014). Social History:  reports that he has quit smoking. He has a 5.00 pack-year smoking history. He has never used smokeless tobacco. He reports that he does not drink alcohol and does not use drugs. Family History: family history includes Alcohol Abuse in his father; Cancer in his father; Stroke in his mother. Unless otherwise noted, family history is non contributory    The patients home medications have been reviewed. Allergies: Patient has no known allergies. Physical Exam:  Constitutional/General: Alert and oriented x3  Head: Normocephalic and atraumatic  Eyes: PERRL, EOMI, sclera non icteric  ENT: Oropharynx clear, handling secretions  Neck: Supple, full ROM, no stridor, no meningeal signs  Respiratory: Lungs clear to auscultation bilaterally, no wheezes, rales, or rhonchi. Not in respiratory distress  Cardiovascular:  Regular rate. Regular rhythm. No murmurs, no gallops, no rubs. 2+ distal pulses. Equal extremity pulses. GI:  Abdomen Soft, Non tender, + distended. Tympanic to percussion. Hyperactive bowel sounds. No rebound, guarding, or rigidity. No pulsatile masses. Musculoskeletal: Moves all extremities x 4. Warm and well perfused,  no clubbing, no cyanosis, no edema. Palpable peripheral pulses  Integument: skin warm and dry. No rashes.    Neurologic: GCS 15, no focal deficits  Psychiatric: Normal Affect      I directly supervised any procedures performed by the resident and was present for the procedure including all critical portions of the procedure                I, Dr. Janine Lopez, am the primary provider of record      Medical Decision Making:   SBO. NG tube, STAT surgery consult, needs admission      Name and Route of medications administered in the ED:  Medications   pantoprazole (PROTONIX) injection 40 mg (40 mg IntraVENous Given 11/7/22 1123)   aluminum & magnesium hydroxide-simethicone (MAALOX) 30 mL, lidocaine viscous hcl (XYLOCAINE) 5 mL (GI COCKTAIL) ( Oral Given 11/7/22 1123)   iopamidol (ISOVUE-370) 76 % injection 75 mL (75 mLs IntraVENous Given 11/7/22 1213)                   This patient's ED course included: a personal history and physicial examination, re-evaluation prior to disposition, IV medications, and complex medical decision making and emergency management    This patient has remained hemodynamically stable during their ED course. Consultations:  General Surgery        1.  Small bowel obstruction (HCC)            William Chen MD  11/07/22 4226

## 2022-11-07 NOTE — ED NOTES
Patient to CT @ this time     Dennise Candelaria, [address] [place] [address]  11/07/22 9543

## 2022-11-07 NOTE — ED NOTES
Unable to fully protocol pt at this time; pt covered in bugs. pt decontaminated and belongings double bagged and labeled and left in [facility] room.       Jamar Benitez RN  11/06/22 3505

## 2022-11-07 NOTE — ED PROVIDER NOTES
HPI     Patient is a 46 y.o. male presents with a chief complaint of belching, indigestion  This has been occurring for 2 days. Patient states that it gets better with nothing. Patient states that it gets worse with nothing. Patient states that it is severe in severity. Patient states it was gradual in onset. Pt arrived with the complaint of increased belching with some mild abd pain. He has a PMH of BCC, depression and HTN. He states for 2 days now his belching has kept him up at night. He can still eat and drink. He denies acid reflux or nausea/vomiting. He has not had a BM for 1 week but says this is normal for him and that he is still passing lots of gas. He feels the abd pain is a result of the belching and says that he hasn't felt the pain before and it does not get worse with eating. He denies CP, SOB, N/V, cough, hematuria, dysuria, melena. Review of Systems   Constitutional:  Negative for chills and fever. HENT:  Negative for ear pain and sinus pain. Eyes:  Negative for pain. Respiratory:  Negative for cough and shortness of breath. Cardiovascular:  Negative for chest pain and palpitations. Gastrointestinal:  Positive for constipation. Negative for abdominal pain, diarrhea, nausea and vomiting. Belching   Genitourinary:  Negative for difficulty urinating, dysuria and flank pain. Musculoskeletal:  Negative for back pain. Skin:  Negative for rash. Neurological:  Negative for dizziness, weakness, light-headedness and headaches. Psychiatric/Behavioral:  Negative for confusion. Physical Exam  Constitutional:       General: He is not in acute distress. Appearance: Normal appearance. He is not ill-appearing. HENT:      Head: Normocephalic. Comments: Scar on right temporal scalp from procedure     Right Ear: External ear normal.      Left Ear: External ear normal.      Nose: Nose normal. No congestion or rhinorrhea.       Mouth/Throat:      Mouth: Mucous membranes are moist.   Eyes:      Conjunctiva/sclera: Conjunctivae normal.      Pupils: Pupils are equal, round, and reactive to light. Cardiovascular:      Rate and Rhythm: Normal rate and regular rhythm. Pulses: Normal pulses. Pulmonary:      Effort: Pulmonary effort is normal. No respiratory distress. Breath sounds: Normal breath sounds. No stridor. No wheezing or rales. Abdominal:      General: Abdomen is flat. Bowel sounds are increased. There is no distension. Palpations: Abdomen is soft. Tenderness: There is abdominal tenderness in the right upper quadrant. There is no guarding. Musculoskeletal:         General: No tenderness. Normal range of motion. Cervical back: Normal range of motion and neck supple. Skin:     General: Skin is warm. Findings: No erythema, lesion or rash. Neurological:      General: No focal deficit present. Mental Status: He is alert and oriented to person, place, and time. Sensory: No sensory deficit. Motor: No weakness. Psychiatric:         Behavior: Behavior normal.        Procedures     EKG: This EKG is signed by emergency department physician. Rate: 124  Rhythm: Sinus tachycardia   Interpretation: no acute changes  Comparison: stable as compared to patient's most recent EKG       MDM         Patient is a 46 y.o. male presenting with belching and indigestion for 2 days. He has not had a BM for 1 week which he says is normal for him. He stated he was still passing gas. On arrival vitals were stable. Exam showed some mild RUQ pain. He was given protonix and GI cocktail which did not provide substantial relief but he reported the symptoms were tolerable. Labs and imaging were ordered revealing a likely SBO with a distended stomach. NG tube was placed and surgery consulted. Dr. Onesimo Quiroga team evaluated the patient and agreed to admit him for further evaluation and management.  Pt agreed with this plan and was admitted in stable condition.       --------------------------------------------- PAST HISTORY ---------------------------------------------  Past Medical History:  has a past medical history of Basal cell carcinoma of skin, Cancer ([facility] [address] Depression, Hypertension, and Severe episode of recurrent major [facility] disorder[facility] Past Surgical History:  has a past surgical history that includes Brain tumor excision (1972); Appendectomy; Mandible surgery; and other surgical history (Right, 6/11/2014). Social History:  reports that he has quit smoking. He has a 5.00 pack-year smoking history. He has never used smokeless tobacco. He reports that he does not drink alcohol and does not use drugs. Family History: family history includes Alcohol Abuse in his father; Cancer in his father; Stroke in his mother. The patients home medications have been reviewed. Allergies: Patient has no known allergies.     -------------------------------------------------- RESULTS -------------------------------------------------    LABS:  Results for orders placed or performed during the hospital encounter of 11/07/22   CBC with Auto Differential   Result Value Ref Range    WBC 11.1 4.5 - 11.5 E9/L    RBC 5.83 (H) 3.80 - 5.80 E12/L    Hemoglobin 15.8 12.5 - 16.5 g/dL    Hematocrit 46.9 37.0 - 54.0 %    MCV 80.4 80.0 - 99.9 fL    MCH 27.1 26.0 - 35.0 pg    MCHC 33.7 32.0 - 34.5 %    RDW 13.7 11.5 - 15.0 fL    Platelets 608 458 - 452 E9/L    MPV 9.8 7.0 - 12.0 fL    Neutrophils % 64.7 43.0 - 80.0 %    Immature Granulocytes % 0.4 0.0 - 5.0 %    Lymphocytes % 21.0 20.0 - 42.0 %    Monocytes % 7.5 2.0 - 12.0 %    Eosinophils % 5.5 0.0 - 6.0 %    Basophils % 0.9 0.0 - 2.0 %    Neutrophils Absolute 7.19 1.80 - 7.30 E9/L    Immature Granulocytes # 0.04 E9/L    Lymphocytes Absolute 2.33 1.50 - 4.00 E9/L    Monocytes Absolute 0.83 0.10 - 0.95 E9/L    Eosinophils Absolute 0.61 (H) 0.05 - 0.50 E9/L    Basophils Absolute 0. 10 0.00 - 0.20 E9/L   CMP   Result Value Ref Range    Sodium 137 132 - 146 mmol/L    Potassium 3.8 3.5 - 5.0 mmol/L    Chloride 100 98 - 107 mmol/L    CO2 25 22 - 29 mmol/L    Anion Gap 12 7 - 16 mmol/L    Glucose 139 (H) 74 - 99 mg/dL    BUN 11 6 - 20 mg/dL    Creatinine 1.0 0.7 - 1.2 mg/dL    Est, Glom Filt Rate >60 >=60 mL/min/1.73    Calcium 9.4 8.6 - 10.2 mg/dL    Total Protein 7.6 6.4 - 8.3 g/dL    Albumin 4.2 3.5 - 5.2 g/dL    Total Bilirubin 0.5 0.0 - 1.2 mg/dL    Alkaline Phosphatase 147 (H) 40 - 129 U/L    ALT 11 0 - 40 U/L    AST 12 0 - 39 U/L   Lipase   Result Value Ref Range    Lipase 39 13 - 60 U/L   Lactic Acid   Result Value Ref Range    Lactic Acid 2.1 0.5 - 2.2 mmol/L   Troponin   Result Value Ref Range    Troponin, High Sensitivity 6 0 - 11 ng/L   Basic Metabolic Panel w/ Reflex to MG   Result Value Ref Range    Sodium 142 132 - 146 mmol/L    Potassium reflex Magnesium 4.0 3.5 - 5.0 mmol/L    Chloride 102 98 - 107 mmol/L    CO2 28 22 - 29 mmol/L    Anion Gap 12 7 - 16 mmol/L    Glucose 98 74 - 99 mg/dL    BUN 16 6 - 20 mg/dL    Creatinine 1.0 0.7 - 1.2 mg/dL    Est, Glom Filt Rate >60 >=60 mL/min/1.73    Calcium 9.5 8.6 - 10.2 mg/dL   CBC with Auto Differential   Result Value Ref Range    WBC 8.6 4.5 - 11.5 E9/L    RBC 5.47 3.80 - 5.80 E12/L    Hemoglobin 15.2 12.5 - 16.5 g/dL    Hematocrit 44.9 37.0 - 54.0 %    MCV 82.1 80.0 - 99.9 fL    MCH 27.8 26.0 - 35.0 pg    MCHC 33.9 32.0 - 34.5 %    RDW 14.0 11.5 - 15.0 fL    Platelets 622 025 - 254 E9/L    MPV 9.7 7.0 - 12.0 fL    Neutrophils % 64.1 43.0 - 80.0 %    Immature Granulocytes % 0.2 0.0 - 5.0 %    Lymphocytes % 20.1 20.0 - 42.0 %    Monocytes % 8.7 2.0 - 12.0 %    Eosinophils % 5.9 0.0 - 6.0 %    Basophils % 1.0 0.0 - 2.0 %    Neutrophils Absolute 5.50 1.80 - 7.30 E9/L    Immature Granulocytes # 0.02 E9/L    Lymphocytes Absolute 1.73 1.50 - 4.00 E9/L    Monocytes Absolute 0.75 0.10 - 0.95 E9/L    Eosinophils Absolute 0.51 (H) 0.05 - 0.50 E9/L    Basophils Absolute 0.09 0.00 - 0.20 E9/L   EKG 12 Lead   Result Value Ref Range    Ventricular Rate 124 BPM    Atrial Rate 124 BPM    P-R Interval 138 ms    QRS Duration 76 ms    Q-T Interval 318 ms    QTc Calculation (Bazett) 456 ms    P Axis 118 degrees    R Axis 167 degrees    T Axis 143 degrees       RADIOLOGY:  FL SMALL BOWEL FOLLOW THROUGH ONLY   Final Result   1. No small or large bowel obstruction. Relatively rapid small-bowel   transit time of less than 30 minutes. 2.  Developmental small-bowel marotation and with small bowel within the   right abdomen. 3.  If symptoms persist, consider further assessment of gastric emptying with   nuclear gastric emptying scan. RECOMMENDATIONS:   Unavailable         XR ABDOMEN FOR NG/OG/NE TUBE PLACEMENT   Final Result   NG tube appears in good radiographic positioning. RECOMMENDATION:   Careful clinical correlation and follow up recommended. CT ABDOMEN PELVIS W IV CONTRAST Additional Contrast? None   Final Result   Distended stomach with decompressed small and large bowel, concerning for   gastric outlet or proximal small bowel obstruction. Constipation. XR CHEST PORTABLE   Final Result   No acute disease. RECOMMENDATION:   Careful clinical correlation and follow up recommended. ------------------------- NURSING NOTES AND VITALS REVIEWED ---------------------------  Date / Time Roomed:  11/7/2022 10:20 AM  ED Bed Assignment:  [id number]    The nursing notes within the ED encounter and vital signs as below have been reviewed.      Patient Vitals for the past 24 hrs:   BP Temp Temp src Pulse Resp SpO2 Height Weight   11/08/22 1054 (!) 152/93 98.9 °F (37.2 °C) Oral (!) 118 18 98 % 5' (1.524 m) 157 lb 13.6 oz (71.6 kg)   11/08/22 0703 (!) 165/93 98.4 °F (36.9 °C) -- (!) 111 16 93 % -- --   11/08/22 0536 -- -- -- -- -- 93 % -- --   11/08/22 0433 (!) 149/91 -- -- (!) 111 16 90 % -- --   11/08/22 0303 (!) 155/87 -- -- (!) 111 27 90 % -- --   11/08/22 0121 (!) 167/103 -- -- (!) 116 21 92 % -- --   11/07/22 1436 (!) 171/93 98 °F (36.7 °C) -- (!) 108 18 98 % -- --       Oxygen Saturation Interpretation: Normal    ------------------------------------------ PROGRESS NOTES ------------------------------------------  Re-evaluation(s):  Time: 1500  Patients symptoms show no change  Repeat physical examination is not changed    Counseling:  I have spoken with the patient and discussed todays results, in addition to providing specific details for the plan of care and counseling regarding the diagnosis and prognosis. Their questions are answered at this time and they are agreeable with the plan of admission.    --------------------------------- ADDITIONAL PROVIDER NOTES ---------------------------------  Consultations:  Time: 1300. Spoke with surgical resident. Discussed case. They will admit the patient. This patient's ED course included: a personal history and physicial examination, re-evaluation prior to disposition, multiple bedside re-evaluations, IV medications, cardiac monitoring, continuous pulse oximetry, and complex medical decision making and emergency management    This patient has remained hemodynamically stable during their ED course. Diagnosis:  1. Small bowel obstruction (HCC)        Disposition:  Patient's disposition: Admit to med/surg floor  Patient's condition is stable. Patient was given return precautions. Labs were interpreted by me. Patient will follow up with their primary care provider. Patient is agreeable to this plan. Patient has remained stable throughout their stay in the ED. Patient was seen and evaluated by myself and my attending Nasreen Pérez MD. Assessment and Plan discussed with attending provider, please see attestation for final plan of care. This note was done using dictation software and there may be some grammatical errors associated with this.     Chino Hodges, DO Frandy Rojas DO  Resident  11/08/22 8930

## 2022-11-08 ENCOUNTER — APPOINTMENT (OUTPATIENT)
Dept: GENERAL RADIOLOGY | Age: 52
DRG: 247 | End: 2022-11-08
Payer: MEDICAID

## 2022-11-08 LAB
ANION GAP SERPL CALCULATED.3IONS-SCNC: 12 MMOL/L (ref 7–16)
BASOPHILS ABSOLUTE: 0.09 E9/L (ref 0–0.2)
BASOPHILS RELATIVE PERCENT: 1 % (ref 0–2)
BUN BLDV-MCNC: 16 MG/DL (ref 6–20)
CALCIUM SERPL-MCNC: 9.5 MG/DL (ref 8.6–10.2)
CHLORIDE BLD-SCNC: 102 MMOL/L (ref 98–107)
CO2: 28 MMOL/L (ref 22–29)
CREAT SERPL-MCNC: 1 MG/DL (ref 0.7–1.2)
EOSINOPHILS ABSOLUTE: 0.51 E9/L (ref 0.05–0.5)
EOSINOPHILS RELATIVE PERCENT: 5.9 % (ref 0–6)
GFR SERPL CREATININE-BSD FRML MDRD: >60 ML/MIN/1.73
GLUCOSE BLD-MCNC: 98 MG/DL (ref 74–99)
HCT VFR BLD CALC: 44.9 % (ref 37–54)
HEMOGLOBIN: 15.2 G/DL (ref 12.5–16.5)
IMMATURE GRANULOCYTES #: 0.02 E9/L
IMMATURE GRANULOCYTES %: 0.2 % (ref 0–5)
LYMPHOCYTES ABSOLUTE: 1.73 E9/L (ref 1.5–4)
LYMPHOCYTES RELATIVE PERCENT: 20.1 % (ref 20–42)
MCH RBC QN AUTO: 27.8 PG (ref 26–35)
MCHC RBC AUTO-ENTMCNC: 33.9 % (ref 32–34.5)
MCV RBC AUTO: 82.1 FL (ref 80–99.9)
MONOCYTES ABSOLUTE: 0.75 E9/L (ref 0.1–0.95)
MONOCYTES RELATIVE PERCENT: 8.7 % (ref 2–12)
NEUTROPHILS ABSOLUTE: 5.5 E9/L (ref 1.8–7.3)
NEUTROPHILS RELATIVE PERCENT: 64.1 % (ref 43–80)
PDW BLD-RTO: 14 FL (ref 11.5–15)
PLATELET # BLD: 299 E9/L (ref 130–450)
PMV BLD AUTO: 9.7 FL (ref 7–12)
POTASSIUM REFLEX MAGNESIUM: 4 MMOL/L (ref 3.5–5)
RBC # BLD: 5.47 E12/L (ref 3.8–5.8)
SODIUM BLD-SCNC: 142 MMOL/L (ref 132–146)
WBC # BLD: 8.6 E9/L (ref 4.5–11.5)

## 2022-11-08 PROCEDURE — 6360000002 HC RX W HCPCS: Performed by: STUDENT IN AN ORGANIZED HEALTH CARE EDUCATION/TRAINING PROGRAM

## 2022-11-08 PROCEDURE — 80048 BASIC METABOLIC PNL TOTAL CA: CPT

## 2022-11-08 PROCEDURE — 2580000003 HC RX 258

## 2022-11-08 PROCEDURE — 74250 X-RAY XM SM INT 1CNTRST STD: CPT

## 2022-11-08 PROCEDURE — 85025 COMPLETE CBC W/AUTO DIFF WBC: CPT

## 2022-11-08 PROCEDURE — 6360000002 HC RX W HCPCS

## 2022-11-08 PROCEDURE — 1200000000 HC SEMI PRIVATE

## 2022-11-08 PROCEDURE — 6360000004 HC RX CONTRAST MEDICATION: Performed by: RADIOLOGY

## 2022-11-08 RX ORDER — METOCLOPRAMIDE HYDROCHLORIDE 5 MG/ML
10 INJECTION INTRAMUSCULAR; INTRAVENOUS EVERY 6 HOURS
Status: DISCONTINUED | OUTPATIENT
Start: 2022-11-08 | End: 2022-11-09 | Stop reason: HOSPADM

## 2022-11-08 RX ADMIN — Medication 10 ML: at 12:00

## 2022-11-08 RX ADMIN — METOCLOPRAMIDE 10 MG: 5 INJECTION, SOLUTION INTRAMUSCULAR; INTRAVENOUS at 16:06

## 2022-11-08 RX ADMIN — ENOXAPARIN SODIUM 40 MG: 100 INJECTION SUBCUTANEOUS at 11:59

## 2022-11-08 RX ADMIN — DIATRIZOATE MEGLUMINE AND DIATRIZOATE SODIUM 120 ML: 660; 100 LIQUID ORAL; RECTAL at 09:29

## 2022-11-08 RX ADMIN — Medication 10 ML: at 20:22

## 2022-11-08 RX ADMIN — METOCLOPRAMIDE 10 MG: 5 INJECTION, SOLUTION INTRAMUSCULAR; INTRAVENOUS at 20:22

## 2022-11-08 ASSESSMENT — LIFESTYLE VARIABLES
HOW MANY STANDARD DRINKS CONTAINING ALCOHOL DO YOU HAVE ON A TYPICAL DAY: PATIENT DOES NOT DRINK
HOW OFTEN DO YOU HAVE A DRINK CONTAINING ALCOHOL: NEVER

## 2022-11-08 NOTE — H&P
GENERAL SURGERY  H&P  11/8/2022    Physician Consulted: Dr. Abdifatah Cavanaugh  Reason for Consult: SBO    HPI  Jimbo Sofia is a 46 y.o. male who presented to ED for about a week of increased belching and bloating and abdominal distention. Pt states that his symptoms started one week ago, but denies any pain or nausea or vomiting. Denies ever having symptoms like this before. Not aggravated or alleviated by anything. Still has appetite. Denies any weight loss, hematemesis, or hematochezia, and states that he is still able to pass flatus. Last BM reported as several days ago. Pt states he has had EGD and colonoscopy before, but does not know when, where, why or by whom. States that he is on a new medicaiton for his involuntary facial movements (tardive dyskinesia) but is unsure what the medicine is called. Past Medical History:   Diagnosis Date    Basal cell carcinoma of skin 11/8/2017    Cancer ([facility] [address] 1/2011    skin- head    Depression     Hypertension     Severe episode of recurrent major depressive disorder, without psychotic features ([facility] [address] 8/18/2017       Past Surgical History:   Procedure Laterality Date    APPENDECTOMY      BRAIN TUMOR EXCISION  1972    MANDIBLE SURGERY      OTHER SURGICAL HISTORY Right 6/11/2014    excision basal cell carcinoma right salp with skin graft. Medications Prior to Admission    Prior to Admission medications    Medication Sig Start Date End Date Taking? Authorizing Provider   amLODIPine (NORVASC) 5 MG tablet TAKE 1 TABLET BY MOUTH DAILY 9/14/22   Young Villa MD   ibuprofen (ADVIL;MOTRIN) 400 MG tablet Take 1 tablet by mouth every 8 hours as needed for Pain 2/21/22   Funmi Martino MD   methyl salicylate-menthol (REJI LOTT GREASELESS) 10-15 % CREA Apply topically 3 times daily as needed for Pain Apply to right hand three times daily as needed for pain 2/21/22   Funmi Martino MD   Misc.  Devices MISC 1 each by Does not apply route once for 1 dose Wrist splint for right hand 2/21/22 2/21/22  Nabila Arambula MD   pantoprazole (PROTONIX) 40 MG tablet TAKE 1 TABLET BY MOUTH EVERY MORNING BEFORE BREAKFAST 1/14/22   Hannah Prieto MD   atorvastatin (LIPITOR) 40 MG tablet Take 1 tablet by mouth daily 1/14/22   Hannah Prieto MD   magnesium citrate solution Take 296 mLs by mouth once for 1 dose 1/14/22 1/14/22  Hannah Prieto MD   senna-docusate (PERICOLACE) 8.6-50 MG per tablet Take 2 tablets by mouth nightly 1/14/22   Hannah Prieto MD   FLOMAX 0.4 MG capsule Take 1 capsule by mouth daily 1/14/22   Hannah Prieto MD   polyethylene glycol [facility]) 17 GM/SCOOP powder Take 17 g by mouth daily 1/12/22   Dillan Cali MD   benzonatate (TESSALON) 100 MG capsule Take 100 mg by mouth 3 times daily as needed for Cough  Patient not taking: Reported on 1/14/2022    Historical Provider, MD   albuterol sulfate HFA (VENTOLIN HFA) 108 (90 Base) MCG/ACT inhaler Inhale 2 puffs into the lungs 4 times daily as needed for Wheezing 5/27/21   Valentina Boyd MD   traZODone (DESYREL) 100 MG tablet  5/12/21   Historical Provider, MD DONEZZA 80 MG CAPS  5/12/21   Historical Provider, MD DONEZZA 40 MG CAPS  1/2/20   Historical Provider, MD   ARIPiprazole (ABILIFY) 15 MG tablet  1/7/20   Historical Provider, MD   acetaminophen (TYLENOL) 500 MG tablet Take 1 tablet by mouth 4 times daily as needed for Pain 1/14/20   Rita Wadsworth MD   venlafaxine (EFFEXOR XR) 150 MG extended release capsule Take 1 capsule by mouth every morning 1/1/19   ERYN Minaya - CNP   vitamin D (CHOLECALCIFEROL) 1000 units TABS tablet Take 1,000 Units by mouth daily    Historical Provider, MD       No Known Allergies    Family History   Problem Relation Age of Onset    Stroke Mother     Alcohol Abuse Father     Cancer Father        Social History     Tobacco Use    Smoking status: Former     Packs/day: 0.50     Years: 10.00     Pack years: 5.00     Types: Cigarettes    Smokeless tobacco: Never   Vaping Use Vaping Use: Never used   Substance Use Topics    Alcohol use: No    Drug use: No         Review of Systems: pertinent ROS listed in HPI, all others negative       PHYSICAL EXAM:    Vitals:    11/08/22 0303   BP: (!) 155/87   Pulse: (!) 111   Resp: 27   Temp:    SpO2: 90%       GENERAL:  NAD. A&Ox3. HEAD:  Normocephalic. Atraumatic. Tardive dyskinesia noted. EYES:   No scleral icterus. PERRL. LUNGS:  No increased work of breathing. CARDIOVASCULAR: RR  ABDOMEN:  Soft, distended, non-tender. No guarding, rigidity, rebound. EXTREMITIES:   MAEx4. Atraumatic. No LE edema. SKIN:  Warm and dry  NEUROLOGIC:  GCS 15    ASSESSMENT/PLAN:  46 y.o. male with SBO    NPO  IVF  NGT to LIWS  SBFT in am  Admit to general floor    Plan discussed with Dr. Luis Enrique Jaramillo.     Prabhakar Marcos, DO  Surgery Resident PGY-1  11/8/2022  5:28 AM

## 2022-11-08 NOTE — CONSULTS
GENERAL SURGERY  CONSULT NOTE  11/7/2022    Physician Consulted: Dr. Charlie Goodwin  Reason for Consult: SBO    HPI  Maya Fonseca is a 46 y.o. male who presented to ED for about a week of increased belching and bloating and abdominal distention. Pt states that his symptoms started one week ago, but denies any pain or nausea or vomiting. Denies ever having symptoms like this before. Not aggravated or alleviated by anything. Still has appetite. Denies any weight loss, hematemesis, or hematochezia, and states that he is still able to pass flatus. Last BM reported as several days ago. Pt states he has had EGD and colonoscopy before, but does not know when, where, why or by whom. States that he is on a new medicaiton for his involuntary facial movements (tardive dyskinesia) but is unsure what the medicine is called. Past Medical History:   Diagnosis Date    Basal cell carcinoma of skin 11/8/2017    Cancer ([facility] [address] 1/2011    skin- head    Depression     Hypertension     Severe episode of recurrent major depressive disorder, without psychotic features ([facility] [address] 8/18/2017       Past Surgical History:   Procedure Laterality Date    APPENDECTOMY      BRAIN TUMOR EXCISION  1972    MANDIBLE SURGERY      OTHER SURGICAL HISTORY Right 6/11/2014    excision basal cell carcinoma right salp with skin graft. Medications Prior to Admission    Prior to Admission medications    Medication Sig Start Date End Date Taking? Authorizing Provider   amLODIPine (NORVASC) 5 MG tablet TAKE 1 TABLET BY MOUTH DAILY 9/14/22   Kandi Wei MD   ibuprofen (ADVIL;MOTRIN) 400 MG tablet Take 1 tablet by mouth every 8 hours as needed for Pain 2/21/22   Dajuan Wasserman MD   methyl salicylate-menthol (REJI LOTT GREASELESS) 10-15 % CREA Apply topically 3 times daily as needed for Pain Apply to right hand three times daily as needed for pain 2/21/22   Dajuan Wasserman MD   Misc.  Devices MISC 1 each by Does not apply route once for 1 dose Wrist splint for right hand 2/21/22 2/21/22  Obdulia Lopez MD   pantoprazole (PROTONIX) 40 MG tablet TAKE 1 TABLET BY MOUTH EVERY MORNING BEFORE BREAKFAST 1/14/22   Alondra Flores MD   atorvastatin (LIPITOR) 40 MG tablet Take 1 tablet by mouth daily 1/14/22   Alondra Flores MD   magnesium citrate solution Take 296 mLs by mouth once for 1 dose 1/14/22 1/14/22  Alondra Flores MD   senna-docusate (PERICOLACE) 8.6-50 MG per tablet Take 2 tablets by mouth nightly 1/14/22   Alondra Flores MD   FLOMAX 0.4 MG capsule Take 1 capsule by mouth daily 1/14/22   Alondra Flores MD   polyethylene glycol [facility]) 17 GM/SCOOP powder Take 17 g by mouth daily 1/12/22   Shena Sifuentes MD   benzonatate (TESSALON) 100 MG capsule Take 100 mg by mouth 3 times daily as needed for Cough  Patient not taking: Reported on 1/14/2022    Historical Provider, MD   albuterol sulfate HFA (VENTOLIN HFA) 108 (90 Base) MCG/ACT inhaler Inhale 2 puffs into the lungs 4 times daily as needed for Wheezing 5/27/21   Kary Estes MD   traZODone (DESYREL) 100 MG tablet  5/12/21   Historical Provider, MD DONEZZA 80 MG CAPS  5/12/21   Historical Provider, MD DONEZZA 40 MG CAPS  1/2/20   Historical Provider, MD   ARIPiprazole (ABILIFY) 15 MG tablet  1/7/20   Historical Provider, MD   acetaminophen (TYLENOL) 500 MG tablet Take 1 tablet by mouth 4 times daily as needed for Pain 1/14/20   Eric Rodriguez MD   venlafaxine (EFFEXOR XR) 150 MG extended release capsule Take 1 capsule by mouth every morning 1/1/19   ERYN Palmer CNP   vitamin D (CHOLECALCIFEROL) 1000 units TABS tablet Take 1,000 Units by mouth daily    Historical Provider, MD       No Known Allergies    Family History   Problem Relation Age of Onset    Stroke Mother     Alcohol Abuse Father     Cancer Father        Social History     Tobacco Use    Smoking status: Former     Packs/day: 0.50     Years: 10.00     Pack years: 5.00     Types: Cigarettes    Smokeless tobacco: Never   Vaping

## 2022-11-08 NOTE — ED NOTES
Brittany Lebron  was given a bag with  [employer]  belongings in it. Clothing and wallet , patient said to let him take it with him.       Annemarie Chen, CECE  16/52/86 [id number]

## 2022-11-08 NOTE — PROGRESS NOTES
GENERAL SURGERY  DAILY PROGRESS NOTE  11/8/2022    Chief Complaint   Patient presents with    Abdominal Pain     Pt to ED stating he has been burping more than normal x1 day. Pt c/o epigastric pain, -nausea, -vomiting, -SOB, -CP. Pt arrives A&Ox4, ambulatory, drinking soda in triage. Pt states last BM 1 week ago which is normal for him. Subjective:  Abdominal pain controlled overnight. No BM or flatus overnight. 350 cc out from NG tube. Objective:  BP (!) 165/93   Pulse (!) 111   Temp 98.4 °F (36.9 °C)   Resp 16   Wt 180 lb (81.6 kg)   SpO2 93%   BMI 35.15 kg/m²     GENERAL:  Laying in bed, awake, alert, cooperative, no apparent distress  HEAD: Normocephalic, atraumatic. NG to LIWS. Tardive dyskinesia   EYES: No sclera icterus, pupils equal  LUNGS:  No increased work of breathing  CARDIOVASCULAR:  regular rate   ABDOMEN:  Soft, mild distention, mild tenderness to palpation   EXTREMITIES: No edema or swelling  SKIN: Warm and dry    Assessment/Plan:  46 y.o. male with abdominal pain 2/2 SBO     Continue NG to LIWS  NPO, IVF  SBFT today     Electronically signed by Kevin Pete MD on 11/8/2022 at 7:36 AM       Attending Attestation   Patient seen and examined, agree with resident note except for changes made by me, for remaining HP/Consult/progress note details please see resident HP/Consult/progress note. - passing some flatus this AM  SBFT completed wnl    He has  congenital malrotation of the gut. Remove JUDAH,  Fly Rubi MD                                                            ROS not completed due to pt inability to answer all questions.

## 2022-11-08 NOTE — ED NOTES
Pt's monitor alarmed, SpO2 was 86% on RA. Pt was placed on 2L NC. Pt now 93%.      Cade Chen RN  11/08/22 9888

## 2022-11-09 VITALS
DIASTOLIC BLOOD PRESSURE: 73 MMHG | SYSTOLIC BLOOD PRESSURE: 143 MMHG | WEIGHT: 157.85 LBS | TEMPERATURE: 98.2 F | HEART RATE: 101 BPM | RESPIRATION RATE: 20 BRPM | BODY MASS INDEX: 30.99 KG/M2 | OXYGEN SATURATION: 91 % | HEIGHT: 60 IN

## 2022-11-09 LAB
ANION GAP SERPL CALCULATED.3IONS-SCNC: 11 MMOL/L (ref 7–16)
BASOPHILS ABSOLUTE: 0.05 E9/L (ref 0–0.2)
BASOPHILS RELATIVE PERCENT: 0.5 % (ref 0–2)
BUN BLDV-MCNC: 13 MG/DL (ref 6–20)
CALCIUM SERPL-MCNC: 8.4 MG/DL (ref 8.6–10.2)
CHLORIDE BLD-SCNC: 98 MMOL/L (ref 98–107)
CO2: 29 MMOL/L (ref 22–29)
CREAT SERPL-MCNC: 0.9 MG/DL (ref 0.7–1.2)
EOSINOPHILS ABSOLUTE: 0.37 E9/L (ref 0.05–0.5)
EOSINOPHILS RELATIVE PERCENT: 3.5 % (ref 0–6)
GFR SERPL CREATININE-BSD FRML MDRD: >60 ML/MIN/1.73
GLUCOSE BLD-MCNC: 101 MG/DL (ref 74–99)
HCT VFR BLD CALC: 40.1 % (ref 37–54)
HEMOGLOBIN: 13.5 G/DL (ref 12.5–16.5)
IMMATURE GRANULOCYTES #: 0.02 E9/L
IMMATURE GRANULOCYTES %: 0.2 % (ref 0–5)
LYMPHOCYTES ABSOLUTE: 1.2 E9/L (ref 1.5–4)
LYMPHOCYTES RELATIVE PERCENT: 11.5 % (ref 20–42)
MAGNESIUM: 1.7 MG/DL (ref 1.6–2.6)
MCH RBC QN AUTO: 27.3 PG (ref 26–35)
MCHC RBC AUTO-ENTMCNC: 33.7 % (ref 32–34.5)
MCV RBC AUTO: 81 FL (ref 80–99.9)
MONOCYTES ABSOLUTE: 0.76 E9/L (ref 0.1–0.95)
MONOCYTES RELATIVE PERCENT: 7.3 % (ref 2–12)
NEUTROPHILS ABSOLUTE: 8.07 E9/L (ref 1.8–7.3)
NEUTROPHILS RELATIVE PERCENT: 77 % (ref 43–80)
PDW BLD-RTO: 13.4 FL (ref 11.5–15)
PLATELET # BLD: 239 E9/L (ref 130–450)
PMV BLD AUTO: 10 FL (ref 7–12)
POTASSIUM REFLEX MAGNESIUM: 3.5 MMOL/L (ref 3.5–5)
RBC # BLD: 4.95 E12/L (ref 3.8–5.8)
SODIUM BLD-SCNC: 138 MMOL/L (ref 132–146)
TROPONIN, HIGH SENSITIVITY: 11 NG/L (ref 0–11)
WBC # BLD: 10.5 E9/L (ref 4.5–11.5)

## 2022-11-09 PROCEDURE — 80048 BASIC METABOLIC PNL TOTAL CA: CPT

## 2022-11-09 PROCEDURE — 84484 ASSAY OF TROPONIN QUANT: CPT

## 2022-11-09 PROCEDURE — 6360000002 HC RX W HCPCS: Performed by: STUDENT IN AN ORGANIZED HEALTH CARE EDUCATION/TRAINING PROGRAM

## 2022-11-09 PROCEDURE — 6370000000 HC RX 637 (ALT 250 FOR IP)

## 2022-11-09 PROCEDURE — 83735 ASSAY OF MAGNESIUM: CPT

## 2022-11-09 PROCEDURE — 36415 COLL VENOUS BLD VENIPUNCTURE: CPT

## 2022-11-09 PROCEDURE — 6370000000 HC RX 637 (ALT 250 FOR IP): Performed by: SURGERY

## 2022-11-09 PROCEDURE — 85025 COMPLETE CBC W/AUTO DIFF WBC: CPT

## 2022-11-09 PROCEDURE — 2580000003 HC RX 258

## 2022-11-09 PROCEDURE — 6360000002 HC RX W HCPCS

## 2022-11-09 PROCEDURE — 93005 ELECTROCARDIOGRAM TRACING: CPT | Performed by: SURGERY

## 2022-11-09 RX ORDER — SENNA AND DOCUSATE SODIUM 50; 8.6 MG/1; MG/1
2 TABLET, FILM COATED ORAL NIGHTLY
Status: DISCONTINUED | OUTPATIENT
Start: 2022-11-09 | End: 2022-11-09

## 2022-11-09 RX ORDER — POLYETHYLENE GLYCOL 3350 17 G/17G
17 POWDER, FOR SOLUTION ORAL DAILY
Status: DISCONTINUED | OUTPATIENT
Start: 2022-11-09 | End: 2022-11-09

## 2022-11-09 RX ORDER — BISACODYL 10 MG
10 SUPPOSITORY, RECTAL RECTAL DAILY PRN
Status: DISCONTINUED | OUTPATIENT
Start: 2022-11-09 | End: 2022-11-09

## 2022-11-09 RX ORDER — ATORVASTATIN CALCIUM 40 MG/1
40 TABLET, FILM COATED ORAL DAILY
Status: DISCONTINUED | OUTPATIENT
Start: 2022-11-09 | End: 2022-11-09 | Stop reason: HOSPADM

## 2022-11-09 RX ORDER — ROSUVASTATIN CALCIUM 20 MG/1
20 TABLET, COATED ORAL DAILY
Status: ON HOLD | COMMUNITY
End: 2022-11-09 | Stop reason: HOSPADM

## 2022-11-09 RX ORDER — AMLODIPINE BESYLATE 5 MG/1
5 TABLET ORAL DAILY
Status: DISCONTINUED | OUTPATIENT
Start: 2022-11-09 | End: 2022-11-09 | Stop reason: HOSPADM

## 2022-11-09 RX ADMIN — Medication 10 ML: at 09:35

## 2022-11-09 RX ADMIN — METOCLOPRAMIDE 10 MG: 5 INJECTION, SOLUTION INTRAMUSCULAR; INTRAVENOUS at 01:39

## 2022-11-09 RX ADMIN — ATORVASTATIN CALCIUM 40 MG: 40 TABLET, FILM COATED ORAL at 12:46

## 2022-11-09 RX ADMIN — METOCLOPRAMIDE 10 MG: 5 INJECTION, SOLUTION INTRAMUSCULAR; INTRAVENOUS at 05:28

## 2022-11-09 RX ADMIN — METOCLOPRAMIDE 10 MG: 5 INJECTION, SOLUTION INTRAMUSCULAR; INTRAVENOUS at 12:47

## 2022-11-09 RX ADMIN — POLYETHYLENE GLYCOL 3350 17 G: 17 POWDER, FOR SOLUTION ORAL at 09:35

## 2022-11-09 RX ADMIN — ENOXAPARIN SODIUM 40 MG: 100 INJECTION SUBCUTANEOUS at 09:34

## 2022-11-09 RX ADMIN — AMLODIPINE BESYLATE 5 MG: 5 TABLET ORAL at 12:46

## 2022-11-09 NOTE — PROGRESS NOTES
GENERAL SURGERY  DAILY PROGRESS NOTE  11/9/2022    Chief Complaint   Patient presents with    Abdominal Pain     Pt to ED stating he has been burping more than normal x1 day. Pt c/o epigastric pain, -nausea, -vomiting, -SOB, -CP. Pt arrives A&Ox4, ambulatory, drinking soda in triage. Pt states last BM 1 week ago which is normal for him. Subjective: Roderick die bowels moving no cp sob,     Objective:  BP (!) 147/65   Pulse (!) 113   Temp 98.6 °F (37 °C) (Oral)   Resp 20   Ht 5' (1.524 m)   Wt 157 lb 13.6 oz (71.6 kg)   SpO2 92%   BMI 30.83 kg/m²     GENERAL:  Laying in bed, awake, alert, cooperative, no apparent distress  HEAD: Normocephalic, atraumatic. NG to LIWS. Tardive dyskinesia   EYES: No sclera icterus, pupils equal  LUNGS:  No increased work of breathing  CARDIOVASCULAR:  tachy no extra heart sounds,    ABDOMEN:  Soft, mild distention, mild tenderness to palpation   EXTREMITIES: No edema or swelling  SKIN: Warm and dry    Assessment/Plan:  46 y.o. male with abdominal pain 2/2 SBO       Gen diet  Tachycardia not clear why, labs ok volume status seems ok, will check trop EKG, restarted home meds,   Dc planning soon.     Francisco José MD

## 2022-11-09 NOTE — H&P
CC: Bloating  HPI  46 y.o. male who presented to ED with complaints of increased belching and bloating and abdominal distention. Pt states that his symptoms started one week ago, and have been lasting since then. He describes it a genrealized full feeling throughout his abdomen that is not really painful. He states that nothing makes it better or worse. Still has appetite. Denies ever having symptoms like this before. Denies any weight loss, hematemesis, or hematochezia, and states that he is still able to pass flatus. He also reports tthat last BM was several days ago. Past Medical History:   Diagnosis Date    Basal cell carcinoma of skin 11/8/2017    Cancer ([facility] [address] 1/2011     skin- head    Depression      Hypertension      Severe episode of recurrent major depressive disorder, without psychotic features ([facility] [address] 8/18/2017          Past Surgical History:      Procedure Laterality Date    APPENDECTOMY        BRAIN TUMOR EXCISION   1972    MANDIBLE SURGERY        OTHER SURGICAL HISTORY Right 6/11/2014     excision basal cell carcinoma right salp with skin graft. Pt states he has had EGD and colonoscopy before, but does not recall who preferomed it and where it was done. Famly History:    Stroke Mother      Alcohol Abuse Father      Cancer Father       Allergies: no allergies  Medications:     Medication Sig Start Date End Date Taking? Authorizing Provider   amLODIPine (NORVASC) 5 MG tablet TAKE 1 TABLET BY MOUTH DAILY 9/14/22     Baylee Stallings MD   ibuprofen (ADVIL;MOTRIN) 400 MG tablet Take 1 tablet by mouth every 8 hours as needed for Pain 2/21/22     Sherry Carcamo MD   methyl salicylate-menthol (REJI LOTT GREASELESS) 10-15 % CREA Apply topically 3 times daily as needed for Pain Apply to right hand three times daily as needed for pain 2/21/22     Sherry Carcamo MD   Misc.  Devices MISC 1 each by Does not apply route once for 1 dose Wrist splint for right hand 2/21/22 2/21/22   Sherry Carcamo MD pantoprazole (PROTONIX) 40 MG tablet TAKE 1 TABLET BY MOUTH EVERY MORNING BEFORE BREAKFAST 1/14/22     Azalea Tello MD   atorvastatin (LIPITOR) 40 MG tablet Take 1 tablet by mouth daily 1/14/22     Azalea Tello MD   magnesium citrate solution Take 296 mLs by mouth once for 1 dose 1/14/22 1/14/22   Azalea Tello MD   senna-docusate (PERICOLACE) 8.6-50 MG per tablet Take 2 tablets by mouth nightly 1/14/22     Azalea Tello MD   FLOMAX 0.4 MG capsule Take 1 capsule by mouth daily 1/14/22     Azalea Tello MD   polyethylene glycol [facility]) 17 GM/SCOOP powder Take 17 g by mouth daily 1/12/22     Denise Savage MD   benzonatate (TESSALON) 100 MG capsule Take 100 mg by mouth 3 times daily as needed for Cough  Patient not taking: Reported on 1/14/2022       Historical Provider, MD   albuterol sulfate HFA (VENTOLIN HFA) 108 (90 Base) MCG/ACT inhaler Inhale 2 puffs into the lungs 4 times daily as needed for Wheezing 5/27/21     Dulce Sharma MD   traZODone (DESYREL) 100 MG tablet   5/12/21     Historical Provider, MD GIBBSZA 80 MG CAPS   5/12/21     Historical Provider, MD   INGREZZA 40 MG CAPS   1/2/20     Historical Provider, MD   ARIPiprazole (ABILIFY) 15 MG tablet   1/7/20     Historical Provider, MD   acetaminophen (TYLENOL) 500 MG tablet Take 1 tablet by mouth 4 times daily as needed for Pain 1/14/20     Lula Rowley MD   venlafaxine (EFFEXOR XR) 150 MG extended release capsule Take 1 capsule by mouth every morning 1/1/19     ERYN Vu - CNP   vitamin D (CHOLECALCIFEROL) 1000 units TABS tablet Take 1,000 Units by mouth daily       Historical Provider, MD          States that he is on a new medicaiton for his tardive dyskinesia but is unsure what the medicine is called. Social History:   lives at home reports that he has quit smoking. He has a 5.00 pack-year smoking history.  He has never used smokeless tobacco. He reports that he does not drink alcohol and does not use drugs.     Review of Systems:   General: No fever, chills   Skin: No rashes, lesions  Head: No trauma  Nose: No discharge  Neck: No swelling or goiter  CV: No chest pain  Resp: No dyspnea or hemoptysis  GI: No diarrhea, no vomiting. Does have nausea  : no hisory of STD, no blood in urine  MSK: no myaglia, no arthralgia     pertinent ROS listed in HPI, all others negative         PHYSICAL EXAM:     Vitals:     11/08/22 0303   BP: (!) 155/87   Pulse: (!) 111   Resp: 27   Temp:     SpO2: 90%        GENERAL:  NAD. A&Ox3. HEAD:  Normocephalic. Atraumatic. Tardive dyskinesia noted. EYES:   No scleral icterus. PERRL. LUNGS:  No increased work of breathing. CARDIOVASCULAR: RR  ABDOMEN:  Soft, distended, non-tender. No guarding, rigidity, rebound. EXTREMITIES:   MAEx4. Atraumatic. No LE edema.   SKIN:  Warm and dry  NEUROLOGIC:  GCS 15     ASSESSMENT/PLAN:    46 y.o. male with SBO     NPO  IVF  NGT to [facility]  SBFT  Admit to general floor

## 2022-11-09 NOTE — DISCHARGE SUMMARY
Physician Discharge Summary     Patient ID:  Elaine Fernandez  [id number]  24 y.o.  1970    Admit date: 11/7/2022    Discharge date and time: No discharge date for patient encounter. Admitting Physician: Scott Onofre MD     Admission Diagnoses: Small bowel obstruction ([facility] [address] [K56.609]  SBO (small bowel obstruction) ([facility] [address] [Y30.702]    Discharge Diagnoses: Principal Problem:    Small bowel obstruction ([facility] [address]  Resolved Problems:    * No resolved hospital problems. *      Admission Condition: fair    Discharged Condition: stable    Indication for Admission: small bowel obstruction    Hospital Course/Procedures/Operation/treatments:   11/8: Patient admitted to floor. Made NPO. NGT to [facility]. Planned for SBFT. Procedure completed, SBFT wnl, patient passing flatus in AM  11/9: Advanced to general diet, restarted home med. Able to DC home    Consults:   IP CONSULT TO GENERAL SURGERY    Significant Diagnostic Studies:   CT ABDOMEN PELVIS W IV CONTRAST Additional Contrast? None    Result Date: 11/7/2022  EXAMINATION: CT OF THE ABDOMEN AND PELVIS WITH CONTRAST 11/7/2022 12:23 pm TECHNIQUE: CT of the abdomen and pelvis was performed with the administration of intravenous contrast. Multiplanar reformatted images are provided for review. Automated exposure control, iterative reconstruction, and/or weight based adjustment of the mA/kV was utilized to reduce the radiation dose to as low as reasonably achievable. COMPARISON: January 11, 2022 HISTORY: ORDERING SYSTEM PROVIDED HISTORY: abd pain, vomiting, c/o obstruction TECHNOLOGIST PROVIDED HISTORY: Additional Contrast?->None Reason for exam:->abd pain, vomiting, c/o obstruction Decision Support Exception - unselect if not a suspected or confirmed emergency medical condition->Emergency Medical Condition (MA) What reading provider will be dictating this exam?->CRC FINDINGS: Lower Chest: No infiltrates or pleural effusion. Organs: No focal liver lesions.   Gallbladder is present with no calcified stones. Spleen is not enlarged. Pancreas and adrenal glands appear unremarkable. There is symmetric enhancement of the kidneys with no hydronephrosis. GI/Bowel: The stomach is distended. The small bowel and colon are decompressed. There is a large amount of retained stool in the colon. Pelvis: Bladder is suboptimally distended with pseudo thickening of the wall. There is no significant free fluid. Peritoneum/Retroperitoneum: No free air or significant adenopathy. Bones/Soft Tissues: Unremarkable. Distended stomach with decompressed small and large bowel, concerning for gastric outlet or proximal small bowel obstruction. Constipation. XR CHEST PORTABLE    Result Date: 11/7/2022  EXAMINATION: ONE XRAY VIEW OF THE CHEST 11/7/2022 4:27 am COMPARISON: None. HISTORY: ORDERING SYSTEM PROVIDED HISTORY: chest pain TECHNOLOGIST PROVIDED HISTORY: Reason for exam:->chest pain What reading provider will be dictating this exam?->CRC FINDINGS: Normal cardiomediastinal silhouette. Lungs clear. No pneumothorax or effusion. Osseous thorax intact. No acute disease. RECOMMENDATION: Careful clinical correlation and follow up recommended. XR ABDOMEN FOR NG/OG/NE TUBE PLACEMENT    Result Date: 11/8/2022  EXAMINATION: ONE SUPINE XRAY VIEW(S) OF THE ABDOMEN 11/7/2022 11:43 pm COMPARISON: None. HISTORY: ORDERING SYSTEM PROVIDED HISTORY: Confirmation of course of NG/OG/NE tube and location of tip of tube TECHNOLOGIST PROVIDED HISTORY: Reason for exam:->Confirmation of course of NG/OG/NE tube and location of tip of tube Portable? ->Yes What reading provider will be dictating this exam?->CRC FINDINGS: Proximal side-hole NG tube the knee the hemidiaphragms, good radiographic positioning. NG tube appears in good radiographic positioning. RECOMMENDATION: Careful clinical correlation and follow up recommended.      FL SMALL BOWEL FOLLOW THROUGH ONLY    Result Date: 11/8/2022  EXAMINATION: GASTROGRAFIN SMALL BOWEL FOLLOW THROUGH SERIES 11/8/2022 TECHNIQUE: Gastrografin small bowel follow through series was performed with serial images obtained. COMPARISON: CT abdomen and pelvis 11/07/2022 HISTORY: ORDERING SYSTEM PROVIDED HISTORY: SBO TECHNOLOGIST PROVIDED HISTORY: Reason for exam:->SBO What reading provider will be dictating this exam?->CRC FINDINGS: The preliminary  views show the distal enteric tube coiled in the region of the gastric fundus. The stomach and small bowel are nondilated. Residual contrast opacification of the urinary bladder related to CT abdomen and pelvis exam performed 1 day earlier. Gastrografin contrast was administered via the indwelling enteric tube. The stomach is nondilated and gastric emptying is satisfactory. 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 No small bowel obstruction. Relatively rapid small-bowel transit time with contrast reaching the right colon before 30 minutes. By 1 hour, the majority of the colon fills with contrast and is nondilated. No bowel obstruction identified. 1.  No small or large bowel obstruction. Relatively rapid small-bowel transit time of less than 30 minutes. 2.  Developmental small-bowel marotation and with small bowel within the right abdomen. 3.  If symptoms persist, consider further assessment of gastric emptying with nuclear gastric emptying scan. RECOMMENDATIONS: Unavailable       Discharge Exam:  GENERAL:  Laying in bed, awake, alert, cooperative, no apparent distress  HEAD: Normocephalic, atraumatic. NG to LIWS.  Tardive dyskinesia   EYES: No sclera icterus, pupils equal  LUNGS:  No increased work of breathing  CARDIOVASCULAR:  tachy no extra heart sounds,    ABDOMEN:  Soft, mild distention, mild tenderness to palpation   EXTREMITIES: No edema or swelling  SKIN: Warm and dry    Disposition: home    In process/preliminary results:  Outstanding Order Results Date and Time Order Name Status Description    11/9/2022 12:15 PM EKG 12 Lead Preliminary             Patient Instructions:   Current Discharge Medication List             Details   ibuprofen (ADVIL;MOTRIN) 400 MG tablet Take 1 tablet by mouth every 8 hours as needed for Pain  Qty: 30 tablet, Refills: 0    Associated Diagnoses: Pain of right hand      pantoprazole (PROTONIX) 40 MG tablet TAKE 1 TABLET BY MOUTH EVERY MORNING BEFORE BREAKFAST  Qty: 90 tablet, Refills: 1    Associated Diagnoses: Gastroesophageal reflux disease without esophagitis      atorvastatin (LIPITOR) 40 MG tablet Take 1 tablet by mouth daily  Qty: 90 tablet, Refills: 1    Associated Diagnoses: Mixed hyperlipidemia      senna-docusate (PERICOLACE) 8.6-50 MG per tablet Take 2 tablets by mouth nightly  Qty: 60 tablet, Refills: 3    Associated Diagnoses: Constipation, unspecified constipation type      polyethylene glycol (MIRALAX) 17 GM/SCOOP powder Take 17 g by mouth daily  Qty: 238 g, Refills: 0      albuterol sulfate HFA (VENTOLIN HFA) 108 (90 Base) MCG/ACT inhaler Inhale 2 puffs into the lungs 4 times daily as needed for Wheezing  Qty: 1 Inhaler, Refills: 0    Associated Diagnoses: Wheezes      traZODone (DESYREL) 100 MG tablet Take 100 mg by mouth in the morning and at bedtime      !! INGREZZA 80 MG CAPS       !! INGREZZA 40 MG CAPS       acetaminophen (TYLENOL) 500 MG tablet Take 1 tablet by mouth 4 times daily as needed for Pain  Qty: 60 tablet, Refills: 1    Associated Diagnoses: Headaches due to old head injury      venlafaxine (EFFEXOR XR) 150 MG extended release capsule Take 1 capsule by mouth every morning  Qty: 30 capsule, Refills: 0       !! - Potential duplicate medications found. Please discuss with provider.         Bowel Blockage (Intestinal Obstruction): Care Instructions  Your Care Instructions  A bowel blockage, also called an intestinal obstruction, can prevent gas, fluids, or solids from moving through the intestines normally. It can cause constipation and, rarely, diarrhea. You may have pain, nausea, vomiting, and cramping. Most of the time, complete blockages require a stay in the hospital and possibly surgery. But if your bowel is only partly blocked, your doctor may tell you to wait until it clears on its own and you are able to pass gas and stool. If so, there are things you can do at home to help make you feel better. If you have had surgery for a bowel blockage, there are things you can do at home to make sure you heal well. You can also make some changes to keep your bowel from becoming blocked again. Follow-up care is a key part of your treatment and safety. Be sure to make and go to all appointments, and call your doctor if you are having problems. It's also a good idea to know your test results and keep a list of the medicines you take. How can you care for yourself at home? If your doctor has told you to wait at home for a blockage to clear on its own: Follow your doctor's instructions. These may include eating a liquid diet to avoid complete blockage. Be safe with medicines. Take your medicines exactly as prescribed. Call your doctor if you think you are having a problem with your medicine. Put a heating pad set on low on your belly to relieve mild cramps and pain. To prevent another blockage  Try to eat smaller amounts of food more often. For example, have 5 or 6 small meals throughout the day instead of 2 or 3 large meals. Chew your food very well. Try to chew each bite about 20 times or until it is liquid. Avoid high-fiber foods and raw fruits and vegetables with skins, husks, strings, or seeds. These can form a ball of undigested material that can cause a blockage if a part of your bowel is scarred or narrowed. Check with your doctor before you eat whole-grain products or use a fiber supplement such as Citrucel or Metamucil.   To help you have regular bowel movements, eat at regular times, do not strain during a bowel movement, and drink plenty of water. If you have kidney, heart, or liver disease and have to limit fluids, talk with your doctor before you increase the amount of fluids you drink. Drink high-calorie liquid formulas if your doctor says to. Severe symptoms may make it hard for your body to take in vitamins and minerals. Get regular exercise. It helps you digest your food better. Get at least 30 minutes of physical activity on most days of the week. Walking is a good choice. When should you call for help? Call your doctor now or seek immediate medical care if:    You have a fever. You are vomiting. You have new or worse belly pain. You cannot pass stools or gas. Watch closely for changes in your health, and be sure to contact your doctor if you have any problems. Where can you learn more? Go to [email or web address].[email or web address]. org and sign in to your [employer] account. Enter C388 in the Zurn box to learn more about \"Bowel Blockage (Intestinal Obstruction): Care Instructions. \"     If you do not have an account, please click on the \"Sign Up Now\" link. Current as of: June 6, 2022               Content Version: 13.4  © 0132-8070 [employer], [employer]. Care instructions adapted under license by [facility] ([facility]). If you have questions about a medical condition or this instruction, always ask your healthcare professional. [place] [address] any warranty or liability for your use of this information.       Follow up:   Clinton Shah, [address] [facility] [phone]    Follow up in 2 week(s)  Hospitalization Follow-Up       Signed:  Ananda Lebron MD  11/9/2022  5:29 PM

## 2022-11-09 NOTE — CARE COORDINATION
Social Work/Case Management Transition of Care Planning ([facility] [place] [phone]): Patient presented to the hospital due to increase in belching, bloating, and abdominal distention. He was started on IVF and had NGT to [facility] pending small bowel follow through and CT of abdomen. Tests were negative. NG was removed. Patient was placed on a regular diet and has been tolerating it. Met with patient at bedside. He reports he resides in a 2 story home with his aunt and uncle. There are 3-4 DURAN. Patient's bedroom and bathroom are on the second floor. Patient reports he is independent with all aspects of care except for transportation. His uncle provides transportation. No DME or oxygen needs. PCP is Dr. Ray Valdez. Pharmacy is [facility]. No HHC or CHULA history. Plan is to discharge to home with no needs. Family to provide transport home. CM/SW will follow.   FRANSICO Waldrop  11/9/2022

## 2022-11-11 LAB
EKG ATRIAL RATE: 111 BPM
EKG P AXIS: 63 DEGREES
EKG P-R INTERVAL: 128 MS
EKG Q-T INTERVAL: 372 MS
EKG QRS DURATION: 80 MS
EKG QTC CALCULATION (BAZETT): 505 MS
EKG R AXIS: 50 DEGREES
EKG T AXIS: 43 DEGREES
EKG VENTRICULAR RATE: 111 BPM

## 2022-11-11 PROCEDURE — 93010 ELECTROCARDIOGRAM REPORT: CPT | Performed by: INTERNAL MEDICINE

## 2023-02-28 ENCOUNTER — HOSPITAL ENCOUNTER (INPATIENT)
Age: 53
LOS: 6 days | Discharge: HOME | DRG: 751 | End: 2023-03-06
Attending: STUDENT IN AN ORGANIZED HEALTH CARE EDUCATION/TRAINING PROGRAM | Admitting: PSYCHIATRY & NEUROLOGY
Payer: MEDICAID

## 2023-02-28 DIAGNOSIS — R45.851 DEPRESSION WITH SUICIDAL IDEATION: Primary | ICD-10-CM

## 2023-02-28 DIAGNOSIS — F32.A DEPRESSION WITH SUICIDAL IDEATION: Primary | ICD-10-CM

## 2023-02-28 PROBLEM — F33.1 MDD (MAJOR DEPRESSIVE DISORDER), RECURRENT EPISODE, MODERATE (HCC): Status: ACTIVE | Noted: 2023-02-28

## 2023-02-28 LAB
ACETAMINOPHEN LEVEL: <5 MCG/ML (ref 10–30)
ALBUMIN SERPL-MCNC: 4.1 G/DL (ref 3.5–5.2)
ALP BLD-CCNC: 109 U/L (ref 40–129)
ALT SERPL-CCNC: 9 U/L (ref 0–40)
AMPHETAMINE SCREEN, URINE: NOT DETECTED
ANION GAP SERPL CALCULATED.3IONS-SCNC: 9 MMOL/L (ref 7–16)
AST SERPL-CCNC: 13 U/L (ref 0–39)
BARBITURATE SCREEN URINE: NOT DETECTED
BASOPHILS ABSOLUTE: 0.06 E9/L (ref 0–0.2)
BASOPHILS RELATIVE PERCENT: 0.8 % (ref 0–2)
BENZODIAZEPINE SCREEN, URINE: NOT DETECTED
BILIRUB SERPL-MCNC: 1 MG/DL (ref 0–1.2)
BILIRUBIN URINE: NEGATIVE
BLOOD, URINE: NEGATIVE
BUN BLDV-MCNC: 9 MG/DL (ref 6–20)
CALCIUM SERPL-MCNC: 9.1 MG/DL (ref 8.6–10.2)
CANNABINOID SCREEN URINE: NOT DETECTED
CHLORIDE BLD-SCNC: 101 MMOL/L (ref 98–107)
CLARITY: CLEAR
CO2: 27 MMOL/L (ref 22–29)
COCAINE METABOLITE SCREEN URINE: NOT DETECTED
COLOR: YELLOW
CREAT SERPL-MCNC: 0.8 MG/DL (ref 0.7–1.2)
EOSINOPHILS ABSOLUTE: 0.27 E9/L (ref 0.05–0.5)
EOSINOPHILS RELATIVE PERCENT: 3.8 % (ref 0–6)
ETHANOL: <10 MG/DL (ref 0–0.08)
FENTANYL SCREEN, URINE: NOT DETECTED
GFR SERPL CREATININE-BSD FRML MDRD: >60 ML/MIN/1.73
GLUCOSE BLD-MCNC: 93 MG/DL (ref 74–99)
GLUCOSE URINE: NEGATIVE MG/DL
HCT VFR BLD CALC: 48.6 % (ref 37–54)
HEMOGLOBIN: 16.5 G/DL (ref 12.5–16.5)
IMMATURE GRANULOCYTES #: 0.03 E9/L
IMMATURE GRANULOCYTES %: 0.4 % (ref 0–5)
INFLUENZA A: NOT DETECTED
INFLUENZA B: NOT DETECTED
KETONES, URINE: NEGATIVE MG/DL
LEUKOCYTE ESTERASE, URINE: NEGATIVE
LYMPHOCYTES ABSOLUTE: 1.37 E9/L (ref 1.5–4)
LYMPHOCYTES RELATIVE PERCENT: 19.3 % (ref 20–42)
Lab: NORMAL
MCH RBC QN AUTO: 27.4 PG (ref 26–35)
MCHC RBC AUTO-ENTMCNC: 34 % (ref 32–34.5)
MCV RBC AUTO: 80.6 FL (ref 80–99.9)
METHADONE SCREEN, URINE: NOT DETECTED
MONOCYTES ABSOLUTE: 0.46 E9/L (ref 0.1–0.95)
MONOCYTES RELATIVE PERCENT: 6.5 % (ref 2–12)
NEUTROPHILS ABSOLUTE: 4.92 E9/L (ref 1.8–7.3)
NEUTROPHILS RELATIVE PERCENT: 69.2 % (ref 43–80)
NITRITE, URINE: NEGATIVE
OPIATE SCREEN URINE: NOT DETECTED
OXYCODONE URINE: NOT DETECTED
PDW BLD-RTO: 14 FL (ref 11.5–15)
PH UA: 7 (ref 5–9)
PHENCYCLIDINE SCREEN URINE: NOT DETECTED
PLATELET # BLD: 274 E9/L (ref 130–450)
PMV BLD AUTO: 9.8 FL (ref 7–12)
POTASSIUM REFLEX MAGNESIUM: 4.2 MMOL/L (ref 3.5–5)
PROTEIN UA: NEGATIVE MG/DL
RBC # BLD: 6.03 E12/L (ref 3.8–5.8)
SALICYLATE, SERUM: <0.3 MG/DL (ref 0–30)
SARS-COV-2 RNA, RT PCR: NOT DETECTED
SODIUM BLD-SCNC: 137 MMOL/L (ref 132–146)
SPECIFIC GRAVITY UA: 1.01 (ref 1–1.03)
TOTAL PROTEIN: 7.2 G/DL (ref 6.4–8.3)
TRICYCLIC ANTIDEPRESSANTS SCREEN SERUM: NEGATIVE NG/ML
UROBILINOGEN, URINE: 1 E.U./DL
WBC # BLD: 7.1 E9/L (ref 4.5–11.5)

## 2023-02-28 PROCEDURE — 82077 ASSAY SPEC XCP UR&BREATH IA: CPT

## 2023-02-28 PROCEDURE — 81003 URINALYSIS AUTO W/O SCOPE: CPT

## 2023-02-28 PROCEDURE — 80307 DRUG TEST PRSMV CHEM ANLYZR: CPT

## 2023-02-28 PROCEDURE — 80053 COMPREHEN METABOLIC PANEL: CPT

## 2023-02-28 PROCEDURE — 80179 DRUG ASSAY SALICYLATE: CPT

## 2023-02-28 PROCEDURE — 85025 COMPLETE CBC W/AUTO DIFF WBC: CPT

## 2023-02-28 PROCEDURE — 80143 DRUG ASSAY ACETAMINOPHEN: CPT

## 2023-02-28 PROCEDURE — 1240000000 HC EMOTIONAL WELLNESS R&B

## 2023-02-28 PROCEDURE — 87636 SARSCOV2 & INF A&B AMP PRB: CPT

## 2023-02-28 PROCEDURE — 93005 ELECTROCARDIOGRAM TRACING: CPT | Performed by: PHYSICIAN ASSISTANT

## 2023-02-28 PROCEDURE — 99285 EMERGENCY DEPT VISIT HI MDM: CPT

## 2023-02-28 NOTE — ED PROVIDER NOTES
[facility]        Pt Name: Rehana Pratt  MRN: [id number]  [place] 1970  Date of evaluation: 2/28/2023  Provider: Mary Carmen Meraz DO  PCP: Randalyn Skiff, MD  Note Started: 1:57 PM EST 2/28/23    CHIEF COMPLAINT       Chief Complaint   Patient presents with    Suicidal     +SI w/ no plan. -HI -hallucinations. Pt states hx of suicidal attempts and thoughts. Pt denies taking his medications at this time       HISTORY OF PRESENT ILLNESS: 1 or more Elements   History From: patient    Limitations to history : None    Rehana Pratt is a 46 y.o. male with a history of schizophrenia, anxiety, and depression who presents to the emergency department complaining of suicidal ideations. Patient states his symptoms are sudden onset earlier today, persistent, moderate in severity, nothing makes it better or worse. He states that he does feel suicidal.  Triage note states that he does not have a plan, but patient states that he wants to go to sleep anywhere he can and not wake up. Patient denies any homicidal ideations, visual hallucinations, auditory hallucinations, recent hospitalization, recent illness, or other acute symptoms or concerns. Patient does have a history of suicide attempts in the past.  He states that he did get into a fight with his aunt and uncle who he lives with today and that had escalated him to having these thoughts. Nursing Notes were all reviewed and agreed with or any disagreements were addressed in the HPI. REVIEW OF SYSTEMS :      Review of Systems    Positives and Pertinent negatives as per HPI. SURGICAL HISTORY     Past Surgical History:   Procedure Laterality Date    APPENDECTOMY      BRAIN TUMOR EXCISION  1972    MANDIBLE SURGERY      OTHER SURGICAL HISTORY Right 6/11/2014    excision basal cell carcinoma right salp with skin graft.        CURRENTMEDICATIONS       Previous Medications    ACETAMINOPHEN (TYLENOL) 500 MG TABLET    Take 1 tablet by mouth 4 times daily as needed for Pain    ALBUTEROL SULFATE HFA (VENTOLIN HFA) 108 (90 BASE) MCG/ACT INHALER    Inhale 2 puffs into the lungs 4 times daily as needed for Wheezing    ATORVASTATIN (LIPITOR) 40 MG TABLET    Take 1 tablet by mouth daily    IBUPROFEN (ADVIL;MOTRIN) 400 MG TABLET    Take 1 tablet by mouth every 8 hours as needed for Pain    INGREZZA 40 MG CAPS        INGREZZA 80 MG CAPS        PANTOPRAZOLE (PROTONIX) 40 MG TABLET    TAKE 1 TABLET BY MOUTH EVERY MORNING BEFORE BREAKFAST    POLYETHYLENE GLYCOL (MIRALAX) 17 GM/SCOOP POWDER    Take 17 g by mouth daily    SENNA-DOCUSATE (PERICOLACE) 8.6-50 MG PER TABLET    Take 2 tablets by mouth nightly    TRAZODONE (DESYREL) 100 MG TABLET    Take 100 mg by mouth in the morning and at bedtime    VENLAFAXINE (EFFEXOR XR) 150 MG EXTENDED RELEASE CAPSULE    Take 1 capsule by mouth every morning       ALLERGIES     Patient has no known allergies.     FAMILYHISTORY       Family History   Problem Relation Age of Onset    Stroke Mother     Alcohol Abuse Father     Cancer Father         SOCIAL HISTORY       Social History     Tobacco Use    Smoking status: Former     Packs/day: 0.50     Years: 10.00     Pack years: 5.00     Types: Cigarettes    Smokeless tobacco: Never   Vaping Use    Vaping Use: Never used   Substance Use Topics    Alcohol use: No    Drug use: No       SCREENINGS        [place] Coma Scale  Eye Opening: Spontaneous  Best Verbal Response: Oriented  Best Motor Response: Obeys commands  [place] Coma Scale Score: 15                CIWA Assessment  BP: (!) 172/92  Heart Rate: (!) 105           PHYSICAL EXAM  1 or more Elements     ED Triage Vitals   BP Temp Temp Source Heart Rate Resp SpO2 Height Weight   02/28/23 1215 02/28/23 1210 02/28/23 1210 02/28/23 1210 02/28/23 1215 02/28/23 1210 -- --   135/83 97.5 °F (36.4 °C) Temporal (!) 126 18 97 %         Physical Exam    Constitutional/General: Alert and oriented x3, sitting up in bed in no acute distress  Head: Normocephalic and atraumatic  Eyes:  EOMI, conjunctiva normal, sclera non icteric  ENT:  Oropharynx clear, handling secretions, no trismus, no asymmetry of the posterior oropharynx or uvular edema  Neck: Supple, full ROM, no stridor, no meningeal signs  Respiratory: Lungs clear to auscultation bilaterally, no wheezes, rales, or rhonchi. Not in respiratory distress  Cardiovascular: Tachycardic rate. Regular rhythm. No murmurs, no gallops, no rubs. 2+ distal pulses. Equal extremity pulses. Chest: No chest wall tenderness  GI:  Abdomen Soft, Non tender, Non distended. +BS. No rebound, guarding, or rigidity. No pulsatile masses. Musculoskeletal: Moves all extremities x 4. Warm and well perfused, no clubbing, no cyanosis, no edema. Capillary refill <3 seconds  Integument: skin warm and dry. No rashes. Neurologic: GCS 15, no focal deficits, symmetric strength 5/5 in the upper and lower extremities bilaterally  Psychiatric: Flat affect. Depressed mood. Suicidal ideations. Denies homicidal ideations or visual hallucinations auditory hallucinations. DIAGNOSTIC RESULTS   LABS:    Labs Reviewed   CBC WITH AUTO DIFFERENTIAL - Abnormal; Notable for the following components:       Result Value    RBC 6.03 (*)     Lymphocytes % 19.3 (*)     Lymphocytes Absolute 1.37 (*)     All other components within normal limits   SERUM DRUG SCREEN - Abnormal; Notable for the following components:    Acetaminophen Level <5.0 (*)     All other components within normal limits   COVID-19 & INFLUENZA COMBO   COMPREHENSIVE METABOLIC PANEL W/ REFLEX TO MG FOR LOW K   URINE DRUG SCREEN   URINALYSIS       As interpreted by me, the above displayed labs are abnormal. All other labs obtained during this visit were within normal range or not returned as of this dictation.         RADIOLOGY:   Non-plain film images such as CT, Ultrasound and MRI are read by the radiologist.     Interpretation per the Radiologist below, if available at the time of this note:    No orders to display     No results found. No results found. PROCEDURES   Unless otherwise noted below, none     Procedures    CRITICAL CARE TIME (.cct)   0    PAST MEDICAL HISTORY/Chronic Conditions Affecting Care      has a past medical history of Basal cell carcinoma of skin (11/8/2017), Cancer ([facility] [address] (1/2011), Depression, Hypertension, and Severe episode of recurrent major depressive disorder, without psychotic features ([facility] (8/18/2017). EMERGENCY DEPARTMENT COURSE    Vitals:    Vitals:    02/28/23 1210 02/28/23 1215 02/28/23 1533   BP:  135/83 (!) 172/92   Pulse: (!) 126 (!) 118 (!) 105   Resp:  18 18   Temp: 97.5 °F (36.4 °C)     TempSrc: Temporal     SpO2: 97% 97% 98%       Patient was given the following medications:  Medications - No data to display        Medical Decision Making/Differential Diagnosis:    CC/HPI Summary, Social Determinants of health, Records Reviewed, DDx, testing done/not done, ED Course, Reassessment, disposition considerations/shared decision making with patient, consults, disposition:        The patient is a 51-year-old male presents emergency department complaining of suicidal ideations. Patient is tachycardic on arrival but otherwise hemodynamically stable, nontoxic, and in no acute distress. No known social detriments to the patient's health. Prior records were reviewed and the patient was last seen here for depression and suicidal ideation 5 years ago in August 2017. He was admitted at that time per chart review. Differential diagnosis includes but is not limited to depression versus anxiety versus schizophrenia versus mood disorder. EKG showed normal QTc and no acute changes. CBC and CMP did not show any acute abnormalities. Drug screens negative. COVID and flu negative. Urinalysis does not show acute infection.   Patient is medically cleared for inpatient psychiatric evaluation and treatment at this time. CONSULTS: (Who and What was discussed)  IP CONSULT TO [facility] with social work. Discussed case. They will provide consultation. I am the Primary Clinician of Record. FINAL IMPRESSION      1. Depression with suicidal ideation          DISPOSITION/PLAN     DISPOSITION Ed Observation 02/28/2023 02:30:56 PM      PATIENT REFERRED TO:  No follow-up provider specified.     DISCHARGE MEDICATIONS:  New Prescriptions    No medications on file       DISCONTINUED MEDICATIONS:  Discontinued Medications    No medications on file              (Please note that portions of this note were completed with a voice recognition program.  Efforts were made to edit the dictations but occasionally words are mis-transcribed.)    Dallas Andrade DO (electronically signed)           Dallas Andrade DO  02/28/23 0968

## 2023-02-28 NOTE — ED NOTES
Belongings placed in [facility] AN AFFILIATE OF [facility]  02/28/23 8979

## 2023-03-01 PROBLEM — F09 COGNITIVE DISORDER: Status: ACTIVE | Noted: 2023-03-01

## 2023-03-01 PROBLEM — F33.1 MDD (MAJOR DEPRESSIVE DISORDER), RECURRENT EPISODE, MODERATE (HCC): Status: RESOLVED | Noted: 2023-02-28 | Resolved: 2023-03-01

## 2023-03-01 PROBLEM — F33.2 MAJOR DEPRESSIVE DISORDER, RECURRENT EPISODE, SEVERE WITH MIXED FEATURES (HCC): Status: ACTIVE | Noted: 2018-12-26

## 2023-03-01 PROCEDURE — 6370000000 HC RX 637 (ALT 250 FOR IP): Performed by: PSYCHIATRY & NEUROLOGY

## 2023-03-01 PROCEDURE — 6370000000 HC RX 637 (ALT 250 FOR IP): Performed by: NURSE PRACTITIONER

## 2023-03-01 PROCEDURE — 1240000000 HC EMOTIONAL WELLNESS R&B

## 2023-03-01 RX ORDER — NICOTINE 21 MG/24HR
1 PATCH, TRANSDERMAL 24 HOURS TRANSDERMAL DAILY
Status: DISCONTINUED | OUTPATIENT
Start: 2023-03-01 | End: 2023-03-02

## 2023-03-01 RX ORDER — MAGNESIUM HYDROXIDE/ALUMINUM HYDROXICE/SIMETHICONE 120; 1200; 1200 MG/30ML; MG/30ML; MG/30ML
30 SUSPENSION ORAL PRN
Status: DISCONTINUED | OUTPATIENT
Start: 2023-03-01 | End: 2023-03-06 | Stop reason: HOSPADM

## 2023-03-01 RX ORDER — ACETAMINOPHEN 325 MG/1
650 TABLET ORAL EVERY 4 HOURS PRN
Status: DISCONTINUED | OUTPATIENT
Start: 2023-03-01 | End: 2023-03-06 | Stop reason: HOSPADM

## 2023-03-01 RX ORDER — LANOLIN ALCOHOL/MO/W.PET/CERES
3 CREAM (GRAM) TOPICAL NIGHTLY
Status: DISCONTINUED | OUTPATIENT
Start: 2023-03-01 | End: 2023-03-06 | Stop reason: HOSPADM

## 2023-03-01 RX ORDER — PANTOPRAZOLE SODIUM 40 MG/1
40 TABLET, DELAYED RELEASE ORAL
Status: DISCONTINUED | OUTPATIENT
Start: 2023-03-02 | End: 2023-03-06 | Stop reason: HOSPADM

## 2023-03-01 RX ORDER — HALOPERIDOL 5 MG/ML
3 INJECTION INTRAMUSCULAR EVERY 6 HOURS PRN
Status: DISCONTINUED | OUTPATIENT
Start: 2023-03-01 | End: 2023-03-06 | Stop reason: HOSPADM

## 2023-03-01 RX ORDER — HYDROXYZINE PAMOATE 50 MG/1
50 CAPSULE ORAL 3 TIMES DAILY PRN
Status: DISCONTINUED | OUTPATIENT
Start: 2023-03-01 | End: 2023-03-06 | Stop reason: HOSPADM

## 2023-03-01 RX ORDER — DIVALPROEX SODIUM 125 MG/1
250 CAPSULE, COATED PELLETS ORAL EVERY 12 HOURS SCHEDULED
Status: DISCONTINUED | OUTPATIENT
Start: 2023-03-01 | End: 2023-03-03

## 2023-03-01 RX ORDER — ATORVASTATIN CALCIUM 40 MG/1
40 TABLET, FILM COATED ORAL DAILY
Status: DISCONTINUED | OUTPATIENT
Start: 2023-03-01 | End: 2023-03-06 | Stop reason: HOSPADM

## 2023-03-01 RX ORDER — HALOPERIDOL 2 MG/1
3 TABLET ORAL EVERY 6 HOURS PRN
Status: DISCONTINUED | OUTPATIENT
Start: 2023-03-01 | End: 2023-03-06 | Stop reason: HOSPADM

## 2023-03-01 RX ADMIN — MELATONIN 3 MG ORAL TABLET 3 MG: 3 TABLET ORAL at 21:28

## 2023-03-01 RX ADMIN — ATORVASTATIN CALCIUM 40 MG: 40 TABLET, FILM COATED ORAL at 21:29

## 2023-03-01 RX ADMIN — HYDROXYZINE PAMOATE 50 MG: 50 CAPSULE ORAL at 21:29

## 2023-03-01 RX ADMIN — HYDROXYZINE PAMOATE 50 MG: 50 CAPSULE ORAL at 03:07

## 2023-03-01 RX ADMIN — DIVALPROEX SODIUM 250 MG: 125 CAPSULE, COATED PELLETS ORAL at 21:28

## 2023-03-01 RX ADMIN — SERTRALINE 50 MG: 50 TABLET, FILM COATED ORAL at 14:01

## 2023-03-01 SDOH — SOCIAL STABILITY: SOCIAL NETWORK: ARE YOU MARRIED, WIDOWED, DIVORCED, SEPARATED, NEVER MARRIED, OR LIVING WITH A PARTNER?: NEVER MARRIED

## 2023-03-01 SDOH — SOCIAL STABILITY: SOCIAL INSECURITY: WITHIN THE LAST YEAR, HAVE YOU BEEN HUMILIATED OR EMOTIONALLY ABUSED IN OTHER WAYS BY YOUR PARTNER OR EX-PARTNER?: YES

## 2023-03-01 SDOH — HEALTH STABILITY: PHYSICAL HEALTH: ON AVERAGE, HOW MANY MINUTES DO YOU ENGAGE IN EXERCISE AT THIS LEVEL?: 0 MIN

## 2023-03-01 SDOH — SOCIAL STABILITY: SOCIAL INSECURITY: WITHIN THE LAST YEAR, HAVE YOU BEEN AFRAID OF YOUR PARTNER OR EX-PARTNER?: NO

## 2023-03-01 SDOH — SOCIAL STABILITY: SOCIAL NETWORK
DO YOU BELONG TO ANY CLUBS OR ORGANIZATIONS SUCH AS CHURCH GROUPS UNIONS, FRATERNAL OR ATHLETIC GROUPS, OR SCHOOL GROUPS?: NO

## 2023-03-01 SDOH — SOCIAL STABILITY: SOCIAL INSECURITY
WITHIN THE LAST YEAR, HAVE TO BEEN RAPED OR FORCED TO HAVE ANY KIND OF SEXUAL ACTIVITY BY YOUR PARTNER OR EX-PARTNER?: NO

## 2023-03-01 SDOH — SOCIAL STABILITY: SOCIAL NETWORK: HOW OFTEN DO YOU ATTENT MEETINGS OF THE CLUB OR ORGANIZATION YOU BELONG TO?: NEVER

## 2023-03-01 SDOH — ECONOMIC STABILITY: INCOME INSECURITY: IN THE LAST 12 MONTHS, WAS THERE A TIME WHEN YOU WERE NOT ABLE TO PAY THE MORTGAGE OR RENT ON TIME?: PATIENT REFUSED

## 2023-03-01 SDOH — SOCIAL STABILITY: SOCIAL NETWORK: HOW OFTEN DO YOU ATTEND CHURCH OR RELIGIOUS SERVICES?: 1 TO 4 TIMES PER YEAR

## 2023-03-01 SDOH — SOCIAL STABILITY: SOCIAL NETWORK: HOW OFTEN DO YOU GET TOGETHER WITH FRIENDS OR RELATIVES?: MORE THAN THREE TIMES A WEEK

## 2023-03-01 SDOH — ECONOMIC STABILITY: HOUSING INSECURITY: IN THE LAST 12 MONTHS, HOW MANY PLACES HAVE YOU LIVED?: 1

## 2023-03-01 SDOH — ECONOMIC STABILITY: INCOME INSECURITY: HOW HARD IS IT FOR YOU TO PAY FOR THE VERY BASICS LIKE FOOD, HOUSING, MEDICAL CARE, AND HEATING?: SOMEWHAT HARD

## 2023-03-01 SDOH — HEALTH STABILITY: MENTAL HEALTH: HOW MANY STANDARD DRINKS CONTAINING ALCOHOL DO YOU HAVE ON A TYPICAL DAY?: PATIENT DOES NOT DRINK

## 2023-03-01 SDOH — HEALTH STABILITY: MENTAL HEALTH
STRESS IS WHEN SOMEONE FEELS TENSE, NERVOUS, ANXIOUS, OR CAN'T SLEEP AT NIGHT BECAUSE THEIR MIND IS TROUBLED. HOW STRESSED ARE YOU?: RATHER MUCH

## 2023-03-01 SDOH — ECONOMIC STABILITY: FOOD INSECURITY: WITHIN THE PAST 12 MONTHS, THE FOOD YOU BOUGHT JUST DIDN'T LAST AND YOU DIDN'T HAVE MONEY TO GET MORE.: NEVER TRUE

## 2023-03-01 SDOH — HEALTH STABILITY: MENTAL HEALTH: HOW OFTEN DO YOU HAVE A DRINK CONTAINING ALCOHOL?: NEVER

## 2023-03-01 SDOH — SOCIAL STABILITY: SOCIAL NETWORK
IN A TYPICAL WEEK, HOW MANY TIMES DO YOU TALK ON THE PHONE WITH FAMILY, FRIENDS, OR NEIGHBORS?: MORE THAN THREE TIMES A WEEK

## 2023-03-01 SDOH — HEALTH STABILITY: PHYSICAL HEALTH: ON AVERAGE, HOW MANY DAYS PER WEEK DO YOU ENGAGE IN MODERATE TO STRENUOUS EXERCISE (LIKE A BRISK WALK)?: 0 DAYS

## 2023-03-01 SDOH — ECONOMIC STABILITY: FOOD INSECURITY: WITHIN THE PAST 12 MONTHS, YOU WORRIED THAT YOUR FOOD WOULD RUN OUT BEFORE YOU GOT MONEY TO BUY MORE.: NEVER TRUE

## 2023-03-01 SDOH — SOCIAL STABILITY: SOCIAL INSECURITY
WITHIN THE LAST YEAR, HAVE YOU BEEN KICKED, HIT, SLAPPED, OR OTHERWISE PHYSICALLY HURT BY YOUR PARTNER OR EX-PARTNER?: NO

## 2023-03-01 SDOH — ECONOMIC STABILITY: TRANSPORTATION INSECURITY
IN THE PAST 12 MONTHS, HAS LACK OF TRANSPORTATION KEPT YOU FROM MEETINGS, WORK, OR FROM GETTING THINGS NEEDED FOR DAILY LIVING?: NO

## 2023-03-01 ASSESSMENT — SLEEP AND FATIGUE QUESTIONNAIRES
DO YOU USE A SLEEP AID: YES
SLEEP PATTERN: DIFFICULTY FALLING ASLEEP;DISTURBED/INTERRUPTED SLEEP
DO YOU HAVE DIFFICULTY SLEEPING: YES
DO YOU USE A SLEEP AID: YES
DO YOU HAVE DIFFICULTY SLEEPING: YES
AVERAGE NUMBER OF SLEEP HOURS: 4
AVERAGE NUMBER OF SLEEP HOURS: 4

## 2023-03-01 ASSESSMENT — LIFESTYLE VARIABLES
HOW OFTEN DO YOU HAVE A DRINK CONTAINING ALCOHOL: NEVER
HOW MANY STANDARD DRINKS CONTAINING ALCOHOL DO YOU HAVE ON A TYPICAL DAY: PATIENT DOES NOT DRINK
HOW OFTEN DO YOU HAVE A DRINK CONTAINING ALCOHOL: NEVER
HOW OFTEN DO YOU HAVE A DRINK CONTAINING ALCOHOL: NEVER
HOW MANY STANDARD DRINKS CONTAINING ALCOHOL DO YOU HAVE ON A TYPICAL DAY: PATIENT DOES NOT DRINK
HOW MANY STANDARD DRINKS CONTAINING ALCOHOL DO YOU HAVE ON A TYPICAL DAY: PATIENT DOES NOT DRINK

## 2023-03-01 ASSESSMENT — PATIENT HEALTH QUESTIONNAIRE - PHQ9: SUM OF ALL RESPONSES TO PHQ QUESTIONS 1-9: 18

## 2023-03-01 NOTE — PROGRESS NOTES
Patient engaged in recreation activity, The Bucket List movie Patient was calm and cooperative, and was able to follow direction. Patient was 1 out of 8 in attendance.

## 2023-03-01 NOTE — H&P
Department of Psychiatry  History and Physical - Adult     CHIEF COMPLAINT:  \"I need some cream for my face\"    Patient was seen after discussing with the treatment team and reviewing the chart    CIRCUMSTANCES OF ADMISSION:   Patient presented to the ED reporting suicidal thoughts. He was pink slipped for SI  HISTORY OF PRESENT ILLNESS:      The patient is a 46 y.o. male with significant past history of past inpatient psychiatric hospitalizations with last hospitalization here in 2017 and again in 2018 diagnosed with major depressive disorder. Presented to [facility] emergency department reporting suicidal thoughts and he was pink slipped for suicidal ideations. He was medically cleared in the emergency department, UDS in ED is negative, QTc is 434. He was admitted to [facility] for psychiatric evaluation and stabilization. On assessment today the patient is very blunted, he appears depressed however when he talks he is somewhat pressured, he brightens during conversation and seems the opposite of depressed. He talks about stressors regarding his ex, residing near where he lives with her new boyfriend. There is also stressors with the kid. He seems to have some limited intellectual functioning. He is distractible with unstable thought process. Denies any auditory or visual hallucinations over though he appears very preoccupied and easily distracted. He is somewhat guarded. He does endorse symptoms of depression including lack of interest, low mood low energy and some anhedonia however he does appear to have some mixed features. He is alert oriented to person place time situation. He has poor insight judgment and impulse control. He stating that he wishes he was dead or that he would not wake up but he does not have a plan to harm himself. Endorses feelings of hopelessness helplessness.     Psychiatric history:  Patient states that he treats with [facility] counseling but has not been on medications in a long time. He has 2 past inpatient psychiatric hospitalizations here at [facility]) in 2018 and 2017 he was diagnosed with major depressive disorder. He denies any history of self-injurious behaviors denies any history of suicide attempts. He has been treated in the past with Effexor. Previously he treated with [facility] point. Has previously reported a history of depression since childhood and his additional hospitalizations at [facility] in [facility].    Family psychiatric history:  Patient does not provide this information, other than stating that family members have mental health histories. Legal history:  Denies    Substance abuse history:  Denies    Personal family social history:  Patient resides in a house with roommates, but also states that he is homeless, after moving out of his aunt and uncle's house after an argument. Ex-wife lives next door. He has been  since 2015. He completed the 10th grade, receives food stamps and SSI.        Past Medical History:        Diagnosis Date    Basal cell carcinoma of skin 11/8/2017    Cancer ([facility] [address] 1/2011    skin- head    Depression     Hypertension     Severe episode of recurrent major depressive disorder, without psychotic features ([facility] [address] 8/18/2017       Medications Prior to Admission:   Medications Prior to Admission: ibuprofen (ADVIL;MOTRIN) 400 MG tablet, Take 1 tablet by mouth every 8 hours as needed for Pain  pantoprazole (PROTONIX) 40 MG tablet, TAKE 1 TABLET BY MOUTH EVERY MORNING BEFORE BREAKFAST  atorvastatin (LIPITOR) 40 MG tablet, Take 1 tablet by mouth daily  senna-docusate (PERICOLACE) 8.6-50 MG per tablet, Take 2 tablets by mouth nightly  polyethylene glycol (MIRALAX) 17 GM/SCOOP powder, Take 17 g by mouth daily  albuterol sulfate HFA (VENTOLIN HFA) 108 (90 Base) MCG/ACT inhaler, Inhale 2 puffs into the lungs 4 times daily as needed for Wheezing  traZODone (DESYREL) 100 MG tablet, Take 100 mg by mouth in the morning and at bedtime  INGREZZA 80 MG CAPS,   INGREZZA 40 MG CAPS,   acetaminophen (TYLENOL) 500 MG tablet, Take 1 tablet by mouth 4 times daily as needed for Pain  venlafaxine (EFFEXOR XR) 150 MG extended release capsule, Take 1 capsule by mouth every morning    Past Surgical History:        Procedure Laterality Date    APPENDECTOMY      BRAIN TUMOR EXCISION  1972    MANDIBLE SURGERY      OTHER SURGICAL HISTORY Right 6/11/2014    excision basal cell carcinoma right salp with skin graft. Allergies:   Patient has no known allergies. Family History  Family History   Problem Relation Age of Onset    Stroke Mother     Alcohol Abuse Father     Cancer Father              EXAMINATION:    REVIEW OF SYSTEMS:    ROS:  [x] All negative/unchanged except if checked.  Explain positive(checked items) below:  [] Constitutional  [] Eyes  [] Ear/Nose/Mouth/Throat  [] Respiratory  [] CV  [] GI  []   [] Musculoskeletal  [] Skin/Breast  [] Neurological  [] Endocrine  [] Heme/Lymph  [] Allergic/Immunologic    Explanation:     Vitals:  BP (!) 168/84   Pulse 94   Temp 97.2 °F (36.2 °C) (Temporal)   Resp 18   Ht 5' 1\" (1.549 m)   Wt 143 lb 7 oz (65.1 kg)   SpO2 96%   BMI 27.10 kg/m²      Physical Examination:   Head: x  Atraumatic: x normocephalic  Skin and Mucosa        Moist x  Dry   Pale  x Normal   Neck:  Thyroid  Palpable   x  Not palpable   venus distention   adenopathy   Chest: x Clear   Rhonchi     Wheezing   CV:  xS1   xS2    xNo murmer   Abdomen:  x  Soft    Tender    Viceromegaly   Extremities:  x No Edema     Edema     Cranial Nerves Examination:   CN II:   xPupils are reactive to light  Pupils are non reactive to light  CN III, IV, VI:  xNo eye deviation    No diplopia or ptosis   CN V:    xFacial Sensation is intact     Facial Sensation is not intact   CN IIIV:   x Hearing is normal to rubbing fingers   CN IX, X:     xNormal gag reflex and phonation   CN XI:   xShoulder shrug and neck rotation is normal  CNXII:    xTongue is midline no deviation or atrophy    Mental Status Examination:    Level of consciousness:  within normal limits   Appearance:  fair grooming and fair hygiene  Behavior/Motor:  no abnormalities noted  Attitude toward examiner:  cooperative  Speech:  spontaneous, normal rate and normal volume  Mood: \" My mood is not good\"  Affect: Blunted, constricted anxious congruent with stated mood  Thought processes: Linear without  flight of ideas or loose associations  Thought content: Devoid of any auditory visual hallucinations delusions or other perceptual normalities.   Denies SI/HI intent or plan, but states that he wishes he would not wake up or to be dead  Language: able to name objects and repeate phrases  Immediate recent remote memory seem intact  Cognition:  oriented to person, place, and time   Fund of Knowledge: Vocabulary intact, pt is aware of current events and past history  Attention and Concentration distractible  Insight judgment impulse control poor    DIAGNOSIS:  Major depressive disorder, recurrent episode, severe with mixed feature  Cognitive disorder          LABS: REVIEWED TODAY:  Recent Labs     02/28/23  1340   WBC 7.1   HGB 16.5        Recent Labs     02/28/23  1340      K 4.2      CO2 27   BUN 9   CREATININE 0.8   GLUCOSE 93     Recent Labs     02/28/23  1340   BILITOT 1.0   ALKPHOS 109   AST 13   ALT 9     Lab Results   Component Value Date/Time    LABAMPH NOT DETECTED 02/28/2023 01:40 PM    LABAMPH NOT DETECTED 02/20/2011 04:52 PM    BARBSCNU NOT DETECTED 02/28/2023 01:40 PM    LABBENZ NOT DETECTED 02/28/2023 01:40 PM    LABBENZ NOT DETECTED 02/20/2011 04:52 PM    CANNAB NOT DETECTED 02/20/2011 04:52 PM    LABMETH NOT DETECTED 02/28/2023 01:40 PM    OPIATESCREENURINE NOT DETECTED 02/28/2023 01:40 PM    PHENCYCLIDINESCREENURINE NOT DETECTED 02/28/2023 01:40 PM    ETOH <10 02/28/2023 01:40 PM     Lab Results   Component Value Date/Time    TSH 2.940 08/17/2017 01:10 PM     No results found for: LITHIUM  No results found for: VALPROATE, CBMZ  No results found for: LITHIUM, VALPROATE      Radiology No results found. TREATMENT PLAN:  The patient's diagnosis, treatment plan, medication management were formulated after patient was seen directly by the attending physician and myself and all relevant documentation was reviewed. Risk, benefit, side effects, possible outcomes of the medication and alternatives discussed with the patient and the patient demonstrated understanding. The patient was also educated that the outcome of treatment will depend on the medication compliance as directed by the prescribers along with regular follow-up, compliance with the labs and other work-up, as clinically indicated. Risk Management: Based on the diagnosis and assessment biopsychosocial treatment model was presented to the patient and was given the opportunity to ask any question. The patient was agreeable to the plan and all the patient's questions were answered to the patient's satisfaction. I discussed with the patient the risk, benefit, alternative and common side effects for the proposed medication treatment. The patient is consenting to this treatment. The patients risk factors have been mitigated as they have been admitted to inpatient behavioral health in an emotionally supportive environment with q 15 minute safety checks. Okay to discontinue the 1:1, low risk for suicide. They have the following:  Risk Factors:  Lack of housing  Prior suicide attempt  Conflict with family  Not taking MH medications  Limited family/friend support     Protective Factors: Outpatient provider- [place]  Has a 3year old son  Steady income  No access to weapons  Help seeking behavior  Initiated this ED visit       Collateral Information:  Will obtain collateral information from the family or friends.   Will obtain medical records as appropriate from out patient providers  Will consult the hospitalist for a physical exam to rule out any co-morbid physical condition. Home medication Reconciled   Reviewed and continued as clinically indicated    New Medications started during this admission :    Depakote 250 mg twice daily for mood stabilization reduce irritability  Zoloft 50 mg daily for depression    Obtain MoCA    Prn Haldol 5mg and Vistaril 50mg q6hr for extreme agitation. Trazodone as ordered for insomnia  Vistaril as ordered for anxiety      Psychotherapy:   Encourage participation in milieu and group therapy  Individual therapy as needed      NOTE: This report was transcribed using voice recognition software. Every effort was made to ensure accuracy; however, inadvertent computerized transcription errors may be present. Behavioral Services  Medicare Certification Upon Admission    I certify that this patient's inpatient psychiatric hospital admission is medically necessary for:    [x] (1) Treatment which could reasonably be expected to improve this patient's condition,       [x] (2) Or for diagnostic study;     AND     [x](2) The inpatient psychiatric services are provided while the individual is under the care of a physician and are included in the individualized plan of care.     Estimated length of stay/service 5 - 7 days based on stability     Plan for post-hospital care follow with OP provider     Electronically signed by ERYN Ayers CNP on 3/1/2023 at 8:06 AM          Electronically signed by ERYN Ayers CNP on 3/1/2023 at 8:05 AM

## 2023-03-01 NOTE — GROUP NOTE
Group Therapy Note    Date: 3/1/2023    Group Start Time: 1400  Group End Time: 1430  Group Topic: Cognitive Skills    [facility] ACUTE [facility] 1    AYESHA Patel LSW        Group Therapy Note    Attendees: 9       Patient's Goal: to participate in group discussion on positive psychology prompt cards and self-care tips. Notes: Pt was an active participant in group discussion. Status After Intervention:  Unchanged    Participation Level:  Active Listener and Interactive    Participation Quality: Appropriate, Attentive, and Sharing      Speech:  normal      Thought Process/Content: Logical      Affective Functioning: Congruent      Mood: anxious      Level of consciousness:  Alert and Oriented x4      Response to Learning: Able to verbalize current knowledge/experience      Endings: None Reported    Modes of Intervention: Education, Support, Socialization, Exploration, Clarifying, and Problem-solving      Discipline Responsible: /Counselor      Signature:  AYESHA Arceo LSW

## 2023-03-01 NOTE — PROGRESS NOTES
Patient attended community meeting   Was updated on expectations of the unit, staffing, and programming  Patient shared goal for today as try to relieve stress and anxiety.

## 2023-03-01 NOTE — PROGRESS NOTES
[facility]  Admission Note     Patient admiited to [facility] unit from [facility] AN AFFILIATE OF [facility] via wheelchair and two attendants with dx MDD. Brisa slipped on 2/28/2023 d/t SI no plan . Patient is A&O x4, affect is flat, sad and preoccupied. Reports anxiety 10/10, depression 10/10. Patient is currently homeless and treats with [facility]. Patient recently lived with aunt/uncle and five roommates. Patient's ex and [de-identified] year old son live next door and she is seeing one of his roommates. This situation is causing him problems when his son visits. Patient is upset with home life and has passive death wish of \"not wanting to wake up\". Past suicide attempt as a teenager. Medical hx: HTN, Skin CA, hyperlipidemia. . Patient noncompliant with medication. Acclimated patient to unit and room, consents signed, snacks provided. Staff will continue to provide support and interventions as needed or requested. Purposeful rounds continued per policy. Admission Type:   Admission Type:  Involuntary    Reason for admission:  Reason for Admission: \" Homelife is stressful\"      Addictive Behavior:   Addictive Behavior  In the Past 3 Months, Have You Felt or Has Someone Told You That You Have a Problem With  : None    Medical Problems:   Past Medical History:   Diagnosis Date    Basal cell carcinoma of skin 11/8/2017    Cancer ([facility] 1/2011    skin- head    Depression     Hypertension     Severe episode of recurrent major depressive disorder, without psychotic features ([facility] [address] [address] 8/18/2017       Status EXAM:  Mental Status and Behavioral Exam  Normal: No  Level of Assistance: Independent/Self  Facial Expression: Sad, Worried  Affect: Blunt  Level of Consciousness: Alert  Frequency of Checks: 4 times per hour, close  Mood:Normal: No  Mood: Depressed, Anxious  Motor Activity:Normal: Yes  Eye Contact: Good  Observed Behavior: Cooperative, Agitated, Preoccupied  Sexual Misconduct History: Current - no  Preception: [place] to person, [place] to time, [place] to place, [place] to situation  Attention:Normal: No  Attention: Distractible  Thought Processes: Circumstantial  Thought Content:Normal: No  Thought Content: Preoccupations  Depression Symptoms: Feelings of hopelessess, Feelings of worthlessness, Feelings of helplessness  Anxiety Symptoms: Generalized  Shea Symptoms: No problems reported or observed.   Hallucinations: None  Delusions: No  Memory:Normal: Yes  Insight and Judgment: No  Insight and Judgment: Poor judgment, Poor insight    Tobacco Screening:  Practical Counseling, on admission, edi X, if applicable and completed (first 3 are required if patient doesn't refuse):            ( ) Recognizing danger situations (included triggers and roadblocks)                    ( ) Coping skills (new ways to manage stress,relaxation techniques, changing routine, distraction)                                                           ( ) Basic information about quitting (benefits of quitting, techniques in how to quit, available resources  ( ) Referral for counseling faxed to [facility]                                                                                                                   ( ) Patient refused counseling  ( x) Patient has not smoked in the last 30 days    Metabolic Screening:    Lab Results   Component Value Date    LABA1C 5.4 12/27/2018       Lab Results   Component Value Date    CHOL 224 (H) 04/03/2021    CHOL 285 (H) 01/14/2020    CHOL 257 (H) 12/27/2018    CHOL 270 (H) 10/10/2013     Lab Results   Component Value Date    TRIG 150 (H) 04/03/2021    TRIG 163 (H) 01/14/2020    TRIG 296 (H) 12/27/2018    TRIG 240 (H) 10/10/2013     Lab Results   Component Value Date    HDL 63 04/03/2021    HDL 59 01/14/2020    HDL 50 12/27/2018    HDL 52.0 10/10/2013     No components found for: LDLCAL  Lab Results   Component Value Date    LABVLDL 30 04/03/2021    LABVLDL 33 01/14/2020    LABVLDL 59 12/27/2018         Body mass index is 27.1 kg/m².    BP Readings from Last 2 Encounters:   03/01/23 (!) 168/84   11/09/22 (!) 143/73           Pt admitted with followings belongings:  Dental Appliances: None  Vision - Corrective Lenses: Eyeglasses  Hearing Aid: None  Jewelry: None  Body Piercings Removed: N/A  Clothing: Footwear, Pants, Socks, Belt, Undergarments  Other Valuables: Other (Comment) (none)    Mira Kilpatrick RN

## 2023-03-01 NOTE — ED NOTES
Behavioral Health Crisis Assessment      Chief Complaint: +SI w/ no plan. -HI -hallucinations. Pt states hx of suicidal attempts and thoughts. Pt denies taking his medications at this time    Mental Status Exam: Pt is alert and oriented x3, calm and cooperative, flat affect, endorses suicidal ideation, denies homicidal ideation, denies visual and auditory hallucinations,    Legal Status:  [] Voluntary:  [x] Involuntary, Issued by: ED doctor    Gender:  [x] Male [] Female [] Transgender  [] Other    Sexual Orientation:  [x] Heterosexual [] Homosexual [] Bisexual [] Other    Brief Clinical Summary: Pt is a 46 y.o. male with a history of schizophrenia, anxiety, and depression who presents to the emergency department complaining of suicidal ideations. Pt states that he \"wants to go to sleep anywhere I can and not wake up. \" Does not have a plan. Pt denies any homicidal ideations, visual hallucinations, and auditory hallucinations. Pt reports he attempted suicide when her was a teenager and had to get his stomach pumped. Pt reports 2 years ago he \"had a nervous breakdown and went to [facility] for a few months. \" Pt states they helped him find his own place and get back on his feet, but states he ended up moving back in with his aunt and uncle at that time. Pt was also inpatient at [facility] on 12/26/18. Pt states he was living with his aunt and uncle for the past 4 years but moved out today after an argument. Pt states he is now homeless and refuses to return there. Pt has a 3year old son and was in close distance to where his son resides with his mother. Pt reports feeling hopeless because he does not have anything to offer, or provide, for his son. Pt reports he does not get along well with his son's mother and her friends. Pt states he follows with [facility] but is not compliant with taking his daily medications. Pt reports he wishes he were dead or that he would go to sleep and not wake up.  Pt denies any self harming behaviors and does not have access to any weapons. Pt would benefit from inpatient psych to ensure his safety. Collateral Information: none    Risk Factors:  Lack of housing  Prior suicide attempt  Conflict with family  Not taking MH medications  Limited family/friend support    Protective Factors: Outpatient provider- [place]  Has a 3year old son  Steady income  No access to weapons  Help seeking behavior  Initiated this ED visit    Suicidal Ideations:  [x] Reports:    [x] Past [x] Present   [] Denies    Suicide Attempts:  [x] Reports: past overdoses in the 1980's;   [] Denies    C-SSRS Screening Completed by RN: Current Suicide Risk:  [] No Risk [] Low [x] Moderate [] High    Homicidal Ideations:  [] Reports:   [] Past [] Present   [x] Denies     Self Injurious/Self Mutilation Behaviors:  [] Reports:    [] Past [] Present   [x] Denies    Hallucinations/Delusions:  [] Reports:   [x] Denies     Substance Use/Alcohol Use/Addiction:  [] Reports:   [x] Denies   [x] SBIRT Screen Complete. Current or Past Substance Abuse Treatment:  [] Yes, When and Where:  [x] No    Current or Past Mental Health Treatment:  [x] Yes, When and Where: has been to [facility] for crisis stabilization; states last psych admission was in 2018. [facility] for outpatient services, not compliant with medications prescribed  [] No    Legal Issues:  []  Yes (Specify)  [x]  No    Access to Weapons:  []  Yes (Specify)  [x]  No    Trauma History:  [] Reports:  [x] Denies     Living Situation: was living with aunt and uncle for past 4 years but states he is now homeless and has no where to go upon discharge. Employment: SSI    Education Level: 10th grade    Violence Risk Screening:        Have you ever thought about hurting someone? [x]  No  []  Yes (Ask the questions listed below)   When? Did you follow through with the thoughts? [x] No     [] Yes- When and what happened?   2.  Have you ever threatened anyone? [x]  No  []  Yes (Ask the questions listed below)   When and what happened? Have you ever threatened someone with a gun, knife or other weapon? [x]  No  []  Yes - When and what happened? 2. Have you ever had an order of protection taken out against you? []  Yes [x]  No  3. Have you ever been arrested due to violence? []  Yes [x]  No  4. Have you ever been cruel to animals?  []  Yes [x]  No    After consideration of C-SSRS screening results, C-SSRS assessments, and this professional's assessment the patient's overall suicide risk assessed to be:  [] No Risk  [x] Low   [] Moderate   [] High     [x] Discussed current suicide risk, protective and risk factors with RN and ED Physician     Disposition:  [] Home:   [] Outpatient Provider:   [] Crisis Unit:   [x] Inpatient Psychiatric Unit:  [] Other:                    Sheela Golden [facility]  02/28/23 3945

## 2023-03-01 NOTE — ED NOTES
SW spoke to [facility] in admitting and assigned bed 7302A.       Jorge A Brown, AYESHA, [facility]  03/01/23 0020

## 2023-03-01 NOTE — GROUP NOTE
Group Therapy Note    Date: 3/1/2023    Group Start Time: 1100  Group End Time: 4408  Group Topic: Education Group - Inpatient    [facility] ACUTE BH 2    Shanice Godfrey, [address]                                                                          Group Therapy Note    Date: 3/1/2023  Start Time: 1100  End Time:  3241  Number of Participants: 9    Type of Group: Psychoeducation    Patient's Goal:  ID what gratitude is and the benefits of gratitude. ID ways to improve gratitude. Demonstrate knowledge of gratitude exercise      Status After Intervention:  Improved    Participation Level:  Active Listener and Interactive    Participation Quality: Appropriate, Attentive, and Sharing      Speech:  normal      Thought Process/Content: Logical      Affective Functioning: Congruent      Mood: euthymic      Level of consciousness:  Alert and Attentive      Response to Learning: Able to verbalize current knowledge/experience and Able to verbalize/acknowledge new learning      Endings: None Reported    Modes of Intervention: Education and Support      Discipline Responsible: Psychoeducational Specialist      Signature:  Shanice Godfrey [address]

## 2023-03-01 NOTE — ED NOTES
Presented to Dr. Shania Croft, accepted to be admitted to [facility], [address] [place] [address]  02/28/23 5252

## 2023-03-01 NOTE — PLAN OF CARE
Denies SI/HI   denies hallucinations    isolative to room much of this shift   cooperative with meds  pleasant on approach  will continue to monitor

## 2023-03-01 NOTE — ED NOTES
Nurse to nurse report called to nurse on [address]. Patient placed in transport.      Lyndal Babinski, RN  03/01/23 0087

## 2023-03-01 NOTE — CARE COORDINATION
Biopsychosocial Assessment Note    Social work met with patient to complete the biopsychosocial assessment and C-SSRS. Chief Complaint: pt reports \"suicidal thoughts. \"     Mental Status Exam: pt alert&oriented x4. Pt cooperative, withdrawn, evasive. Pt mood depressed, anxious, flat affect. Pt eye contact fair, speech clear. Pt thoughts preoccupied, distractible. Pt insight/judgement poor. Pt denied SI/HI/AVH. Clinical Summary: pt reports he has been having suicidal thoughts for the past few months. Pt reports prior to admission he wanted to figure out a way for him to go to sleep and not wake up. Pt reports his SI was triggered by his home life. Pt reports he lives with his aunt, uncle, and few other adults. Pt reports one of the people that live in the home has been seeing his malachi mom. Pt reports this is hard for  him because the roommate is more involved with his son than he is. Pt reports this roommate is trying to be the father figure for pt son and he does not like this. Pt reports he does not want to return home with his aunt, uncle, and roommates. Pt does not know anywhere else to go at time of discharge. Pt reports a hx of inpatient psychiatric admissions. Pt does not know when or where his most recent admission was. Pt last admission at this facility 12/26/18. Pt is active with [facility] but he is not med compliant. Pt reports one previous suicide attempt years ago by overdose. Pt denied any hx of self-injurious behaviors. Pt denied drug or alcohol use hx. Pt denied legal hx. Pt denied abuse hx. Pt completed the 10th grade, receives food stamps, and receives SSI. Pt reports mental health runs in the family but he is not able to provide further details. Pt reports recent decrease in appetite and difficulty sleeping. Pt reports feeling hopeless/helpless with a loss of interest/pleasure in doing things recently.      Risk Factors: mental health diagnosis, prior suicide attempt, limited family/friend support, not taking medications as prescribed, recent conflict with family, family mental health hx    Protective Factors: outpatient provider, has a 3year old son, steady income, help-seeking behavior    Gender  [x] Male [] Female [] Transgender  [] Other    Sexual Orientation    [x] Heterosexual [] Homosexual [] Bisexual [] Other    Suicidal Ideation  [x] Past [] Present [] Denies     C-SSRS Screening Completed: Current Suicide Risk:  [] No Risk  [] Low [x] Moderate [] High    Homicidal Ideation  [] Past [] Present [x] Denies     Hallucinations/Delusions (Specify type)  [] Reports [x] Denies     Current or Past Mental Health Treatment:  [x] Yes, When and Where: [facility]  [] No    Substance Use/Alcohol Use/Addiction  [] Reports [x] Denies     Tobacco Use (within the last 6 months)  [] Reports [x] Denies     Trauma History  [] Reports [x] Denies     Self Injurious/Self Mutilation Behaviors:   [] Reports:    [] Past [] Present   [x] Denies    Legal History:  []  Yes (Specify)    [x] No    Collateral Contact (ANAI signed) pt denied  Name:   Relationship:  Number:     Collateral Information:      Access to Weapons per Collateral Contact: [] Reports [] Denies     After consideration of C-SSRS screening results, C-SSRS assessments, and this professional's assessment the patient's overall suicide risk assessed to be:  [] None   [] Low   [x] Moderate   [] High     [x] Discussed current suicide risk, protective and risk factors with RN and NP/Psychiatrist.    Discharge Plan:  [] Home:  [] Shelter:  [] Crisis Unit:  [] Substance Abuse Rehab:  [] Nursing Facility:  [x] Other (Specify): pt does not know where he wants to go at time of discharge, as of now he does not want to go home with his roommates     Follow up Provider: [facility]

## 2023-03-02 LAB
CHOLESTEROL, TOTAL: 204 MG/DL (ref 0–199)
EKG ATRIAL RATE: 95 BPM
EKG P AXIS: 68 DEGREES
EKG P-R INTERVAL: 154 MS
EKG Q-T INTERVAL: 346 MS
EKG QRS DURATION: 70 MS
EKG QTC CALCULATION (BAZETT): 434 MS
EKG R AXIS: 47 DEGREES
EKG T AXIS: 44 DEGREES
EKG VENTRICULAR RATE: 95 BPM
HBA1C MFR BLD: 5.6 % (ref 4–5.6)
HDLC SERPL-MCNC: 48 MG/DL
LDL CHOLESTEROL CALCULATED: 132 MG/DL (ref 0–99)
TRIGL SERPL-MCNC: 121 MG/DL (ref 0–149)
VLDLC SERPL CALC-MCNC: 24 MG/DL

## 2023-03-02 PROCEDURE — 6370000000 HC RX 637 (ALT 250 FOR IP): Performed by: NURSE PRACTITIONER

## 2023-03-02 PROCEDURE — 93010 ELECTROCARDIOGRAM REPORT: CPT | Performed by: INTERNAL MEDICINE

## 2023-03-02 PROCEDURE — 36415 COLL VENOUS BLD VENIPUNCTURE: CPT

## 2023-03-02 PROCEDURE — 83036 HEMOGLOBIN GLYCOSYLATED A1C: CPT

## 2023-03-02 PROCEDURE — 6370000000 HC RX 637 (ALT 250 FOR IP): Performed by: PSYCHIATRY & NEUROLOGY

## 2023-03-02 PROCEDURE — 80061 LIPID PANEL: CPT

## 2023-03-02 PROCEDURE — 1240000000 HC EMOTIONAL WELLNESS R&B

## 2023-03-02 RX ORDER — DIAPER,BRIEF,INFANT-TODD,DISP
EACH MISCELLANEOUS 2 TIMES DAILY
Status: DISCONTINUED | OUTPATIENT
Start: 2023-03-02 | End: 2023-03-06 | Stop reason: HOSPADM

## 2023-03-02 RX ADMIN — PANTOPRAZOLE SODIUM 40 MG: 40 TABLET, DELAYED RELEASE ORAL at 06:05

## 2023-03-02 RX ADMIN — DIVALPROEX SODIUM 250 MG: 125 CAPSULE, COATED PELLETS ORAL at 08:59

## 2023-03-02 RX ADMIN — HYDROXYZINE PAMOATE 50 MG: 50 CAPSULE ORAL at 19:14

## 2023-03-02 RX ADMIN — HYDROCORTISONE: 1 CREAM TOPICAL at 14:55

## 2023-03-02 RX ADMIN — SERTRALINE 50 MG: 50 TABLET, FILM COATED ORAL at 08:59

## 2023-03-02 RX ADMIN — HYDROCORTISONE: 1 CREAM TOPICAL at 20:56

## 2023-03-02 RX ADMIN — ATORVASTATIN CALCIUM 40 MG: 40 TABLET, FILM COATED ORAL at 20:54

## 2023-03-02 RX ADMIN — MELATONIN 3 MG ORAL TABLET 3 MG: 3 TABLET ORAL at 20:54

## 2023-03-02 RX ADMIN — DIVALPROEX SODIUM 250 MG: 125 CAPSULE, COATED PELLETS ORAL at 20:54

## 2023-03-02 ASSESSMENT — PAIN SCALES - GENERAL
PAINLEVEL_OUTOF10: 0
PAINLEVEL_OUTOF10: 0

## 2023-03-02 NOTE — CARE COORDINATION
STERLING met with pt. Pt reports feeling ok today, denied SI/HI/AVH. Pt reports he is starting to feel better. Pt is active with [facility] and reports he has been trying to get connected with a  there. Pt reports he called them during this admission and they are still looking into getting him a . Pt does not want to return to where he was living prior to coming in. Pt is open to going to [facility] at time of discharge. STERLING contacted [facility] [phone] and scheduled a follow up appointment for 3/10.

## 2023-03-02 NOTE — PROGRESS NOTES
Patient declined invitation to the following groups    Recreation Activity. Patient will continue to be provided with opportunities to enhance leisure skills/interests and/or coping mechanisms.
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
## 2023-03-02 NOTE — PROGRESS NOTES
Patient attended community meeting   Was updated on expectations of the unit, staffing, and programming  Patient was 1 in 16 in attendance. Patient shared goal for today to meet new people.

## 2023-03-02 NOTE — GROUP NOTE
Group Therapy Note    Date: 3/2/2023    Group Start Time: 1410  Group End Time: 7274  Group Topic: Cognitive Skills    [facility]    AYESHA Patel LSW        Group Therapy Note    Attendees: 8       Patient's Goal: To participate in music therapy group. Notes: pt was an active participant. Status After Intervention:  Improved    Participation Level:  Active Listener and Interactive    Participation Quality: Appropriate and Attentive      Speech:  normal      Thought Process/Content: Logical      Affective Functioning: Congruent      Mood: anxious      Level of consciousness:  Alert and Oriented x4      Response to Learning: Able to verbalize current knowledge/experience      Endings: None Reported    Modes of Intervention: Activity      Discipline Responsible: /Counselor      Signature:  AYESHA Bermudez LSW

## 2023-03-02 NOTE — GROUP NOTE
Group Therapy Note    Date: 3/2/2023  Start Time: 0145  End Time:  1567  Number of Participants: 12    Type of Group: pscyhoeducation health/wellness    Patient's Goal:  ID definition of self-love. ID ways to improve self-love. Demonstrate knowledge of self-love exercises    Status After Intervention:  Improved    Participation Level:  Active Listener and Interactive    Participation Quality: Appropriate, Attentive, and Sharing      Speech:  normal      Thought Process/Content: Logical      Affective Functioning: Congruent      Mood: euthymic      Level of consciousness:  Alert and Oriented x4      Response to Learning: Able to verbalize current knowledge/experience and Able to verbalize/acknowledge new learning      Endings: None Reported    Modes of Intervention: Education, Support, and Socialization      Discipline Responsible: Psychoeducational Specialist      Signature:  Dougie Harding

## 2023-03-02 NOTE — PLAN OF CARE
[address] [place] [address]  Initial Interdisciplinary Treatment Plan NOTE    Review Date & Time: 3/2/23 1200    Patient was in treatment team    Admission Type:   Admission Type:  Involuntary    Reason for admission:  Reason for Admission: \" Homelife is stressful\"      Estimated Length of Stay Update:  3-5 days  Estimated Discharge Date Update: 5-7 days    EDUCATION:   Learner Progress Toward Treatment Goals: Reviewed results and recommendations of this team    Method: Group    Outcome: Verbalized understanding    PATIENT GOALS: \"meet new people\"    PLAN/TREATMENT RECOMMENDATIONS UPDATE:day 3    GOALS UPDATE:   Time frame for Short-Term Goals: 3-5 days    Hannah Pereira RN

## 2023-03-02 NOTE — PLAN OF CARE
Denies SI/HI   denies hallucinations  out on the unit more and more social with select peers    states his mood is improved   cooperative with meds   attending groups  no complaints at this time  will continue to monitor

## 2023-03-02 NOTE — PLAN OF CARE
Patient denies SI/HI/AVH. Reports anxiety 5/10, depression 5/10. A&O x 4, denies pain. Observed in bed resting, quiet and cooperative. Medication compliant. No sxs of distress noted. No behaviors noted. Staff will continue to provide support and interventions when needed or requested. Purposeful rounds continued Q 15 minutes. Problem: Coping  Goal: Pt/Family able to verbalize concerns and demonstrate effective coping strategies  Description: INTERVENTIONS:  1. Assist patient/family to identify coping skills, available support systems and cultural and spiritual values  2. Provide emotional support, including active listening and acknowledgement of concerns of patient and caregivers  3. Reduce environmental stimuli, as able  4. Instruct patient/family in relaxation techniques, as appropriate  5. Assess for spiritual pain/suffering and initiate Spiritual Care, Psychosocial Clinical Specialist consults as needed  Outcome: Progressing     Problem: Behavior  Goal: Pt/Family maintain appropriate behavior and adhere to behavioral management agreement, if implemented  Description: INTERVENTIONS:  1. Assess patient/family's coping skills and  non-compliant behavior (including use of illegal substances)  2. Notify security of behavior or suspected illegal substances which indicate the need for search of the family and/or belongings  3. Encourage verbalization of thoughts and concerns in a socially appropriate manner  4. Utilize positive, consistent limit setting strategies supporting safety of patient, staff and others  5. Encourage participation in the decision making process about the behavioral management agreement  6. If a visitor's behavior poses a threat to safety call refer to organization policy.   7. Initiate consult with , Psychosocial CNS, Spiritual Care as appropriate  Outcome: Progressing

## 2023-03-02 NOTE — PROGRESS NOTES
Patient resting quietly to self, respirations are even and unlabored. No sxs of distress or discomfort noted. PRN Visteral 50 mg administered this shift. Purposeful rounds continued Q 15 minutes to ensure the safety of the patient while on the unit.

## 2023-03-03 PROCEDURE — 1240000000 HC EMOTIONAL WELLNESS R&B

## 2023-03-03 PROCEDURE — 6370000000 HC RX 637 (ALT 250 FOR IP): Performed by: NURSE PRACTITIONER

## 2023-03-03 PROCEDURE — 6370000000 HC RX 637 (ALT 250 FOR IP): Performed by: PSYCHIATRY & NEUROLOGY

## 2023-03-03 RX ORDER — DIVALPROEX SODIUM 125 MG/1
500 CAPSULE, COATED PELLETS ORAL NIGHTLY
Status: DISCONTINUED | OUTPATIENT
Start: 2023-03-03 | End: 2023-03-06

## 2023-03-03 RX ORDER — DIVALPROEX SODIUM 250 MG/1
250 TABLET, DELAYED RELEASE ORAL DAILY
Status: DISCONTINUED | OUTPATIENT
Start: 2023-03-04 | End: 2023-03-06 | Stop reason: HOSPADM

## 2023-03-03 RX ORDER — DIVALPROEX SODIUM 125 MG/1
500 CAPSULE, COATED PELLETS ORAL EVERY 12 HOURS SCHEDULED
Status: DISCONTINUED | OUTPATIENT
Start: 2023-03-03 | End: 2023-03-03

## 2023-03-03 RX ADMIN — SERTRALINE 50 MG: 50 TABLET, FILM COATED ORAL at 09:24

## 2023-03-03 RX ADMIN — DIVALPROEX SODIUM 250 MG: 125 CAPSULE, COATED PELLETS ORAL at 09:24

## 2023-03-03 RX ADMIN — ACETAMINOPHEN 650 MG: 325 TABLET ORAL at 09:24

## 2023-03-03 RX ADMIN — DIVALPROEX SODIUM 500 MG: 125 CAPSULE, COATED PELLETS ORAL at 22:55

## 2023-03-03 RX ADMIN — HYDROCORTISONE: 1 CREAM TOPICAL at 22:56

## 2023-03-03 RX ADMIN — MELATONIN 3 MG ORAL TABLET 3 MG: 3 TABLET ORAL at 22:55

## 2023-03-03 RX ADMIN — PANTOPRAZOLE SODIUM 40 MG: 40 TABLET, DELAYED RELEASE ORAL at 06:50

## 2023-03-03 RX ADMIN — HYDROXYZINE PAMOATE 50 MG: 50 CAPSULE ORAL at 09:24

## 2023-03-03 RX ADMIN — HYDROCORTISONE: 1 CREAM TOPICAL at 09:24

## 2023-03-03 RX ADMIN — ATORVASTATIN CALCIUM 40 MG: 40 TABLET, FILM COATED ORAL at 22:55

## 2023-03-03 RX ADMIN — HYDROXYZINE PAMOATE 50 MG: 50 CAPSULE ORAL at 22:55

## 2023-03-03 ASSESSMENT — PAIN DESCRIPTION - ORIENTATION
ORIENTATION: RIGHT
ORIENTATION: RIGHT

## 2023-03-03 ASSESSMENT — PAIN SCALES - GENERAL
PAINLEVEL_OUTOF10: 1
PAINLEVEL_OUTOF10: 0
PAINLEVEL_OUTOF10: 0
PAINLEVEL_OUTOF10: 3

## 2023-03-03 ASSESSMENT — PAIN DESCRIPTION - DESCRIPTORS
DESCRIPTORS: ACHING
DESCRIPTORS: ACHING;SHARP

## 2023-03-03 ASSESSMENT — PAIN DESCRIPTION - FREQUENCY: FREQUENCY: INTERMITTENT

## 2023-03-03 ASSESSMENT — PAIN DESCRIPTION - LOCATION
LOCATION: ARM
LOCATION: SHOULDER

## 2023-03-03 ASSESSMENT — PAIN DESCRIPTION - ONSET: ONSET: ON-GOING

## 2023-03-03 NOTE — PROGRESS NOTES
Pt has been visible on the unit. Isolative to himself. Pt verbalizes anxiety over discharge d/t uncertainty of living situation. Attends group. Q15 min safety maintained.

## 2023-03-03 NOTE — GROUP NOTE
Group Therapy Note    Date: 3/3/2023    Group Start Time: 9714  Group End Time: 8341  Group Topic: Cognitive Skills    [facility]    AYESHA Patel LSW        Group Therapy Note    Attendees: 7       Patient's Goal: To participate in group discussion on strengths. Notes: Pt was an active participant in group discussion. Status After Intervention:  Improved    Participation Level:  Active Listener and Interactive    Participation Quality: Appropriate, Attentive, and Sharing      Speech:  normal      Thought Process/Content: Logical      Affective Functioning: Congruent      Mood: anxious      Level of consciousness:  Alert and Oriented x4      Response to Learning: Able to verbalize current knowledge/experience      Endings: None Reported    Modes of Intervention: Education, Support, Socialization, Exploration, Clarifying, and Problem-solving      Discipline Responsible: /Counselor      Signature:  AYESHA Lentz LSW

## 2023-03-03 NOTE — PROGRESS NOTES
Patient attended morning community meeting. Updated on staffing assignments and daily expectations. Shared goal for the day as to relieve my stress.
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
## 2023-03-03 NOTE — PLAN OF CARE
Problem: Anxiety  Goal: Will report anxiety at manageable levels  Description: INTERVENTIONS:  1. Administer medication as ordered  2. Teach and rehearse alternative coping skills  3. Provide emotional support with 1:1 interaction with staff  Outcome: Progressing     Problem: Coping  Goal: Pt/Family able to verbalize concerns and demonstrate effective coping strategies  Description: INTERVENTIONS:  1. Assist patient/family to identify coping skills, available support systems and cultural and spiritual values  2. Provide emotional support, including active listening and acknowledgement of concerns of patient and caregivers  3. Reduce environmental stimuli, as able  4. Instruct patient/family in relaxation techniques, as appropriate  5. Assess for spiritual pain/suffering and initiate Spiritual Care, Psychosocial Clinical Specialist consults as needed  Outcome: Progressing     Patient has been out on the unit. Mostly keeps to self. Calm and cooperative during assessment. Rated anxiety 10/10 d/t not knowing where he will be going at discharge. He states that he would [facility] out [facility]. Denies suicidal/homicidal ideations and hallucinations at this time. Purposeful rounding continued.

## 2023-03-03 NOTE — PROGRESS NOTES
Assumed care of pt at 0700. Pt observed resting in bed. Calm and cooperative with assessment. Denies current suicidal and homicidal ideation, as well as signs and symptoms of psychosis. Presents blunted with fair eye contact. C/o right arm pain rated 3/10. Medicated with PRN tylenol with good effect. Adequate PO intake. Compliant with scheduled morning medications. Q15 min safety checks maintained.

## 2023-03-03 NOTE — PROGRESS NOTES
Pt continues to deny suicidal and homicidal ideation, as well as s/s of psychosis. No complaints voiced. Attends group. Compliant with scheduled medications. Adequate PO intake. Visible on unit this afternoon. Isolative to self. Q15 min safety checks maintained.

## 2023-03-03 NOTE — GROUP NOTE
Date: 3/3/2023    Group Start Time: 1100  Group End Time: 1150  Group Topic: Psychoeducation    [facility]    JULISSA Oneal                                                                        Group Therapy Note    Date: 3/3/2023  Type of Group: Psychoeducation    Wellness Binder Information  Module Name:  taking time for ones self    Patient's Goal:  Patient will be able to identify how to set time aside for themselves,  In order to manager stressors.     Notes:  Pleasant and able to take turns appropriately to share. Appeared to be an active listener thru group.   Willing to accept handout from group.      Status After Intervention:  Improved    Participation Level: Active Listener and Interactive    Participation Quality: Appropriate, Attentive, and Sharing      Speech:  normal      Thought Process/Content: Logical      Affective Functioning: Congruent      Mood: euthymic      Level of consciousness:  Alert, Oriented x4, and Attentive      Response to Learning: Able to verbalize/acknowledge new learning, Able to retain information, and Progressing to goal      Endings: None Reported    Modes of Intervention: Education, Support, Socialization, Exploration, and Problem-solving      Discipline Responsible: Psychoeducational Specialist      Signature:  JULISSA Oneal

## 2023-03-03 NOTE — PROGRESS NOTES
Patient attended afternoon recreation activity. Patient pleasant and engaged in group.    Patient was 1 our of 11 in attendance

## 2023-03-03 NOTE — PROGRESS NOTES
Pt attended afternoon smoking cessation group. Pt appeared to be an active listener. Pt is able to share appropriately when prompted and asked facilitator relevant questions. Pt was participant 1 of 7.     Electronically signed by Tye Lino on 3/3/2023 at 3:14 PM

## 2023-03-04 LAB — VALPROIC ACID LEVEL: 81 MCG/ML (ref 50–100)

## 2023-03-04 PROCEDURE — 36415 COLL VENOUS BLD VENIPUNCTURE: CPT

## 2023-03-04 PROCEDURE — 6370000000 HC RX 637 (ALT 250 FOR IP): Performed by: PSYCHIATRY & NEUROLOGY

## 2023-03-04 PROCEDURE — 80164 ASSAY DIPROPYLACETIC ACD TOT: CPT

## 2023-03-04 PROCEDURE — 1240000000 HC EMOTIONAL WELLNESS R&B

## 2023-03-04 PROCEDURE — 6370000000 HC RX 637 (ALT 250 FOR IP): Performed by: NURSE PRACTITIONER

## 2023-03-04 RX ADMIN — ACETAMINOPHEN 650 MG: 325 TABLET ORAL at 21:59

## 2023-03-04 RX ADMIN — PANTOPRAZOLE SODIUM 40 MG: 40 TABLET, DELAYED RELEASE ORAL at 06:20

## 2023-03-04 RX ADMIN — DIVALPROEX SODIUM 500 MG: 125 CAPSULE, COATED PELLETS ORAL at 21:59

## 2023-03-04 RX ADMIN — SERTRALINE 50 MG: 50 TABLET, FILM COATED ORAL at 09:03

## 2023-03-04 RX ADMIN — HYDROCORTISONE: 1 CREAM TOPICAL at 09:05

## 2023-03-04 RX ADMIN — ATORVASTATIN CALCIUM 40 MG: 40 TABLET, FILM COATED ORAL at 21:59

## 2023-03-04 RX ADMIN — DIVALPROEX SODIUM 250 MG: 250 TABLET, DELAYED RELEASE ORAL at 09:04

## 2023-03-04 RX ADMIN — HYDROXYZINE PAMOATE 50 MG: 50 CAPSULE ORAL at 21:59

## 2023-03-04 RX ADMIN — HYDROCORTISONE: 1 CREAM TOPICAL at 22:01

## 2023-03-04 RX ADMIN — MELATONIN 3 MG ORAL TABLET 3 MG: 3 TABLET ORAL at 21:59

## 2023-03-04 ASSESSMENT — PAIN DESCRIPTION - DESCRIPTORS: DESCRIPTORS: ACHING

## 2023-03-04 ASSESSMENT — PAIN DESCRIPTION - LOCATION: LOCATION: HEAD

## 2023-03-04 ASSESSMENT — PAIN SCALES - GENERAL
PAINLEVEL_OUTOF10: 4
PAINLEVEL_OUTOF10: 0

## 2023-03-04 NOTE — PROGRESS NOTES
Patient attended community meeting   Was updated on expectations of the unit, staffing, and programming  Patient was 1 out of 12 in participation. Patient shared goal for today as  set up communication.

## 2023-03-04 NOTE — GROUP NOTE
Group Therapy Note    Date: 3/4/2023  Start Time: 1105  End Time:  1150  Number of Participants: 10    Type of Group: Psychoeducation    Wellness Binder Information  Module Name:  Coping Skills a-z    Patient's Goal:  ID healthy vs unhealthy coping mechanisms. ID positive coping skills that start with each letter of the alphabet. Status After Intervention:  Improved    Participation Level:  Active Listener and Interactive    Participation Quality: Appropriate, Attentive, and Sharing      Speech:  normal      Thought Process/Content: Logical      Affective Functioning: Congruent      Mood: euthymic      Level of consciousness:  Alert and Attentive      Response to Learning: Able to verbalize current knowledge/experience and Able to verbalize/acknowledge new learning      Endings: None Reported    Modes of Intervention: Education and Support      Discipline Responsible: Psychoeducational Specialist      Signature:  Pedro Pablo Juárez, [address]

## 2023-03-04 NOTE — PLAN OF CARE
Patient up and about the unit patient denies SI/HI AVH, rates anxiety 6/10 and depression 5/10. Patient is smiling and friendly watching TV states that he is just worried about where he will be going after discharge. Patient compliant with medications and groups and is discharged focused. Will continue to monitor and assess q 15 min for safety throughout the shift. Problem: Anxiety  Goal: Will report anxiety at manageable levels  Description: INTERVENTIONS:  1. Administer medication as ordered  2. Teach and rehearse alternative coping skills  3. Provide emotional support with 1:1 interaction with staff  Outcome: Progressing     Problem: Coping  Goal: Pt/Family able to verbalize concerns and demonstrate effective coping strategies  Description: INTERVENTIONS:  1. Assist patient/family to identify coping skills, available support systems and cultural and spiritual values  2. Provide emotional support, including active listening and acknowledgement of concerns of patient and caregivers  3. Reduce environmental stimuli, as able  4. Instruct patient/family in relaxation techniques, as appropriate  5. Assess for spiritual pain/suffering and initiate Spiritual Care, Psychosocial Clinical Specialist consults as needed  Outcome: Progressing     Problem: Behavior  Goal: Pt/Family maintain appropriate behavior and adhere to behavioral management agreement, if implemented  Description: INTERVENTIONS:  1. Assess patient/family's coping skills and  non-compliant behavior (including use of illegal substances)  2. Notify security of behavior or suspected illegal substances which indicate the need for search of the family and/or belongings  3. Encourage verbalization of thoughts and concerns in a socially appropriate manner  4. Utilize positive, consistent limit setting strategies supporting safety of patient, staff and others  5.  Encourage participation in the decision making process about the behavioral management agreement  6. If a visitor's behavior poses a threat to safety call refer to organization policy. 7. Initiate consult with , Psychosocial CNS, Spiritual Care as appropriate  Outcome: Progressing     Problem: Involuntary Admit  Goal: Will cooperate with staff recommendations and doctor's orders and will demonstrate appropriate behavior  Description: INTERVENTIONS:  1. Treat underlying conditions and offer medication as ordered  2. Educate regarding involuntary admission procedures and rules  3.  Contain excessive/inappropriate behavior per unit and hospital policies  Outcome: Progressing     Problem: Pain  Goal: Verbalizes/displays adequate comfort level or baseline comfort level  Outcome: Progressing  Flowsheets (Taken 3/3/2023 0820 by Reed Kim RN)  Verbalizes/displays adequate comfort level or baseline comfort level: Encourage patient to monitor pain and request assistance

## 2023-03-04 NOTE — PLAN OF CARE
Problem: Coping  Goal: Pt/Family able to verbalize concerns and demonstrate effective coping strategies  Description: INTERVENTIONS:  1. Assist patient/family to identify coping skills, available support systems and cultural and spiritual values  2. Provide emotional support, including active listening and acknowledgement of concerns of patient and caregivers  3. Reduce environmental stimuli, as able  4. Instruct patient/family in relaxation techniques, as appropriate  5. Assess for spiritual pain/suffering and initiate Spiritual Care, Psychosocial Clinical Specialist consults as needed  Outcome: Progressing   Patient denies SI/HI/Hallucinations. Patient in control of his behaviors. Medication compliant. Denies anxiety and depression. No active distress noted. Respirations even and unlabored. Will continue to monitor and will intervene as needed. Problem: Behavior  Goal: Pt/Family maintain appropriate behavior and adhere to behavioral management agreement, if implemented  Description: INTERVENTIONS:  1. Assess patient/family's coping skills and  non-compliant behavior (including use of illegal substances)  2. Notify security of behavior or suspected illegal substances which indicate the need for search of the family and/or belongings  3. Encourage verbalization of thoughts and concerns in a socially appropriate manner  4. Utilize positive, consistent limit setting strategies supporting safety of patient, staff and others  5. Encourage participation in the decision making process about the behavioral management agreement  6. If a visitor's behavior poses a threat to safety call refer to organization policy. 7. Initiate consult with , Psychosocial CNS, Spiritual Care as appropriate  Outcome: Progressing     Problem: Involuntary Admit  Goal: Will cooperate with staff recommendations and doctor's orders and will demonstrate appropriate behavior  Description: INTERVENTIONS:  1.  Treat underlying conditions and offer medication as ordered  2. Educate regarding involuntary admission procedures and rules  3.  Contain excessive/inappropriate behavior per unit and hospital policies  Outcome: Progressing     Problem: Pain  Goal: Verbalizes/displays adequate comfort level or baseline comfort level  Outcome: Progressing
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
## 2023-03-04 NOTE — PROGRESS NOTES
Patient attended afternoon recreation activity, Velvet art and a movie. Patient calm and cooperative. Patient was 11 out of 17 in attendance.

## 2023-03-05 PROCEDURE — 6370000000 HC RX 637 (ALT 250 FOR IP): Performed by: NURSE PRACTITIONER

## 2023-03-05 PROCEDURE — 6370000000 HC RX 637 (ALT 250 FOR IP): Performed by: PSYCHIATRY & NEUROLOGY

## 2023-03-05 PROCEDURE — 1240000000 HC EMOTIONAL WELLNESS R&B

## 2023-03-05 RX ADMIN — HYDROCORTISONE: 1 CREAM TOPICAL at 22:08

## 2023-03-05 RX ADMIN — MELATONIN 3 MG ORAL TABLET 3 MG: 3 TABLET ORAL at 22:06

## 2023-03-05 RX ADMIN — HYDROCORTISONE: 1 CREAM TOPICAL at 08:58

## 2023-03-05 RX ADMIN — DIVALPROEX SODIUM 500 MG: 125 CAPSULE, COATED PELLETS ORAL at 22:06

## 2023-03-05 RX ADMIN — DIVALPROEX SODIUM 250 MG: 250 TABLET, DELAYED RELEASE ORAL at 08:59

## 2023-03-05 RX ADMIN — SERTRALINE 50 MG: 50 TABLET, FILM COATED ORAL at 08:59

## 2023-03-05 RX ADMIN — ATORVASTATIN CALCIUM 40 MG: 40 TABLET, FILM COATED ORAL at 22:06

## 2023-03-05 RX ADMIN — PANTOPRAZOLE SODIUM 40 MG: 40 TABLET, DELAYED RELEASE ORAL at 06:34

## 2023-03-05 ASSESSMENT — PAIN SCALES - GENERAL: PAINLEVEL_OUTOF10: 0

## 2023-03-05 NOTE — PLAN OF CARE
Patient denies SI/HI/Hallucinations. Patient states extremely anxious about discharge. Patient in control of his behavior. Medication compliant. Patient flat and blunt. Patient is in no active distress respirations even and unlabored will continue to monitor and will intervene as needed. Problem: Behavior  Goal: Pt/Family maintain appropriate behavior and adhere to behavioral management agreement, if implemented  Description: INTERVENTIONS:  1. Assess patient/family's coping skills and  non-compliant behavior (including use of illegal substances)  2. Notify security of behavior or suspected illegal substances which indicate the need for search of the family and/or belongings  3. Encourage verbalization of thoughts and concerns in a socially appropriate manner  4. Utilize positive, consistent limit setting strategies supporting safety of patient, staff and others  5. Encourage participation in the decision making process about the behavioral management agreement  6. If a visitor's behavior poses a threat to safety call refer to organization policy. 7. Initiate consult with , Psychosocial CNS, Spiritual Care as appropriate  Outcome: Progressing     Problem: Involuntary Admit  Goal: Will cooperate with staff recommendations and doctor's orders and will demonstrate appropriate behavior  Description: INTERVENTIONS:  1. Treat underlying conditions and offer medication as ordered  2. Educate regarding involuntary admission procedures and rules  3. Contain excessive/inappropriate behavior per unit and hospital policies  Outcome: Progressing     Problem: Coping  Goal: Pt/Family able to verbalize concerns and demonstrate effective coping strategies  Description: INTERVENTIONS:  1. Assist patient/family to identify coping skills, available support systems and cultural and spiritual values  2. Provide emotional support, including active listening and acknowledgement of concerns of patient and caregivers  3. Reduce environmental stimuli, as able  4. Instruct patient/family in relaxation techniques, as appropriate  5.  Assess for spiritual pain/suffering and initiate Spiritual Care, Psychosocial Clinical Specialist consults as needed  Outcome: Progressing

## 2023-03-05 NOTE — GROUP NOTE
Group Therapy Note    Date: 3/5/2023  Start Time: 1500  End Time:  1600  Number of Participants: 8    Type of Group: Recreational, Relaxation      Patient's Goal:  ID positive affirmations and their uses and create a positive affirmation canvas    Notes:  Patients engaged in an art activity that utilized acrylic paint, canvas, and positive affirmations. Soft music also playing during group to promote relaxation. Status After Intervention:  Improved    Participation Level:  Active Listener and Interactive    Participation Quality: Appropriate, Attentive, and Supportive      Speech:  normal      Thought Process/Content: Logical      Affective Functioning: Congruent      Mood: euthymic      Level of consciousness:  Alert and Attentive      Response to Learning: Able to verbalize current knowledge/experience and Able to verbalize/acknowledge new learning      Endings: None Reported    Modes of Intervention: Education, Support, Socialization, Exploration, and Activity      Discipline Responsible: Psychoeducational Specialist      Signature:  Dougie Kurtz

## 2023-03-05 NOTE — PROGRESS NOTES
Patient attended community meeting   Was updated on expectations of the unit, staffing, and programming  Patient shared goal for today as try to make outside calls.

## 2023-03-05 NOTE — PLAN OF CARE
Patient up and about the unit patient denies SI/HI AVH, rates anxiety 8/10 and depression 3/10. Patient is smiling and friendly isolating to room this evening. Patient compliant with medications and groups and is discharged focused. Will continue to monitor and assess q 15 min for safety throughout the shift. Problem: Anxiety  Goal: Will report anxiety at manageable levels  Description: INTERVENTIONS:  1. Administer medication as ordered  2. Teach and rehearse alternative coping skills  3. Provide emotional support with 1:1 interaction with staff  Outcome: Progressing     Problem: Coping  Goal: Pt/Family able to verbalize concerns and demonstrate effective coping strategies  Description: INTERVENTIONS:  1. Assist patient/family to identify coping skills, available support systems and cultural and spiritual values  2. Provide emotional support, including active listening and acknowledgement of concerns of patient and caregivers  3. Reduce environmental stimuli, as able  4. Instruct patient/family in relaxation techniques, as appropriate  5. Assess for spiritual pain/suffering and initiate Spiritual Care, Psychosocial Clinical Specialist consults as needed  Outcome: Progressing     Problem: Behavior  Goal: Pt/Family maintain appropriate behavior and adhere to behavioral management agreement, if implemented  Description: INTERVENTIONS:  1. Assess patient/family's coping skills and  non-compliant behavior (including use of illegal substances)  2. Notify security of behavior or suspected illegal substances which indicate the need for search of the family and/or belongings  3. Encourage verbalization of thoughts and concerns in a socially appropriate manner  4. Utilize positive, consistent limit setting strategies supporting safety of patient, staff and others  5. Encourage participation in the decision making process about the behavioral management agreement  6.  If a visitor's behavior poses a threat to safety call refer to organization policy. 7. Initiate consult with , Psychosocial CNS, Spiritual Care as appropriate  Outcome: Progressing     Problem: Involuntary Admit  Goal: Will cooperate with staff recommendations and doctor's orders and will demonstrate appropriate behavior  Description: INTERVENTIONS:  1. Treat underlying conditions and offer medication as ordered  2. Educate regarding involuntary admission procedures and rules  3.  Contain excessive/inappropriate behavior per unit and hospital policies  Outcome: Progressing     Problem: Pain  Goal: Verbalizes/displays adequate comfort level or baseline comfort level  Outcome: Progressing  Flowsheets (Taken 3/3/2023 0820 by Dariel Black RN)  Verbalizes/displays adequate comfort level or baseline comfort level: Encourage patient to monitor pain and request assistance

## 2023-03-05 NOTE — GROUP NOTE
Group Therapy Note    Date: 3/5/2023    Group Start Time: 1105  Group End Time: 1140  Group Topic: Education Group - Inpatient    [facility]    JULISSA Yanez           Patient's Goal:  ID healthy boundaries and demonstrate knowledge of ways to improve healthy boundaries.      Status After Intervention:  Improved    Participation Level: Active Listener and Interactive    Participation Quality: Appropriate and Attentive      Speech:  normal      Thought Process/Content: Logical      Affective Functioning: Congruent      Mood: euthymic      Level of consciousness:  Alert and Attentive      Response to Learning: Able to verbalize current knowledge/experience and Able to verbalize/acknowledge new learning      Endings: None Reported    Modes of Intervention: Education, Support, and Socialization      Discipline Responsible: Psychoeducational Specialist      Signature:  JULISSA Yanez
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
## 2023-03-06 VITALS
HEIGHT: 61 IN | BODY MASS INDEX: 27.08 KG/M2 | SYSTOLIC BLOOD PRESSURE: 135 MMHG | DIASTOLIC BLOOD PRESSURE: 65 MMHG | WEIGHT: 143.44 LBS | OXYGEN SATURATION: 98 % | HEART RATE: 99 BPM | RESPIRATION RATE: 16 BRPM | TEMPERATURE: 97.8 F

## 2023-03-06 PROCEDURE — 6370000000 HC RX 637 (ALT 250 FOR IP): Performed by: NURSE PRACTITIONER

## 2023-03-06 PROCEDURE — 6370000000 HC RX 637 (ALT 250 FOR IP): Performed by: PSYCHIATRY & NEUROLOGY

## 2023-03-06 RX ORDER — DIVALPROEX SODIUM 500 MG/1
500 TABLET, DELAYED RELEASE ORAL NIGHTLY
Qty: 30 TABLET | Refills: 0 | Status: SHIPPED | OUTPATIENT
Start: 2023-03-06 | End: 2023-04-05

## 2023-03-06 RX ORDER — DIVALPROEX SODIUM 500 MG/1
500 TABLET, DELAYED RELEASE ORAL NIGHTLY
Status: DISCONTINUED | OUTPATIENT
Start: 2023-03-06 | End: 2023-03-06 | Stop reason: HOSPADM

## 2023-03-06 RX ORDER — LANOLIN ALCOHOL/MO/W.PET/CERES
3 CREAM (GRAM) TOPICAL NIGHTLY
Refills: 3 | COMMUNITY
Start: 2023-03-06

## 2023-03-06 RX ORDER — DIVALPROEX SODIUM 250 MG/1
250 TABLET, DELAYED RELEASE ORAL DAILY
Qty: 30 TABLET | Refills: 0 | Status: SHIPPED | OUTPATIENT
Start: 2023-03-07 | End: 2023-04-06

## 2023-03-06 RX ADMIN — SERTRALINE 50 MG: 50 TABLET, FILM COATED ORAL at 09:01

## 2023-03-06 RX ADMIN — PANTOPRAZOLE SODIUM 40 MG: 40 TABLET, DELAYED RELEASE ORAL at 06:37

## 2023-03-06 RX ADMIN — HYDROXYZINE PAMOATE 50 MG: 50 CAPSULE ORAL at 09:01

## 2023-03-06 RX ADMIN — DIVALPROEX SODIUM 250 MG: 250 TABLET, DELAYED RELEASE ORAL at 09:01

## 2023-03-06 RX ADMIN — HYDROCORTISONE: 1 CREAM TOPICAL at 09:01

## 2023-03-06 NOTE — PROGRESS NOTES
[facility]  Discharge Note    Pt discharged with followings belongings:  Dental Appliances: None  Vision - Corrective Lenses: Eyeglasses  Hearing Aid: None  Jewelry: None  Body Piercings Removed: N/A  Clothing: Footwear, Pants, Socks, [employer], Undergarments  Other Valuables: Other (Comment) (none)   Valuables sent home with Patient or returned to patient. Patient educated on aftercare instructions: yes   . Patient verbalize understanding of AVS:  yes       Status EXAM upon discharge:  Mental Status and Behavioral Exam  Normal: No  Level of Assistance: Independent/Self  Facial Expression: Sad, Worried  Affect: Appropriate  Level of Consciousness: Alert  Frequency of Checks: 4 times per hour, close  Mood:Normal: No  Mood: Depressed, Anxious  Motor Activity:Normal: No  Motor Activity: Decreased  Eye Contact: Fair  Observed Behavior: Cooperative, Friendly  Sexual Misconduct History: Current - no  Involved In Any Sexual Misconduct With Others? : No  History of Sexually Inappropriate Behavior When Previously Hospitalized?: No  Uncontrollable/Compulsive Masturbation?: No  Difficulty Controlling Sexual Impulses?: No  Preception: [place] to person, [place] to time, [place] to place, [place] to situation  Attention:Normal: No  Attention: Distractible  Thought Processes: Circumstantial  Thought Content:Normal: No  Thought Content: Preoccupations  Depression Symptoms: Impaired concentration  Anxiety Symptoms: Generalized  Shea Symptoms: No problems reported or observed. Hallucinations: None  Delusions: No  Memory:Normal: No  Memory: Poor recent, Poor remote  Insight and Judgment: No  Insight and Judgment: Poor judgment, Poor insight    Tobacco Screening:  Practical Counseling, on admission, edi X, if applicable and completed (first 3 are required if patient doesn't refuse):             ( X) Recognizing danger situations (included triggers and roadblocks)                    ( X) Coping skills (new ways to manage stress,relaxation techniques, changing routine, distraction)                                                           ( X) Basic information about quitting (benefits of quitting, techniques in how to quit, available resources  (X ) Referral for counseling faxed to [facility]                                                                                                                   ( ) Patient refused counseling  ( ) Patient refused referral  ( ) Patient refused prescription upon discharge  ( ) Patient has not smoked in the last 30 days    Metabolic Screening:    Lab Results   Component Value Date    LABA1C 5.6 03/02/2023       Lab Results   Component Value Date    CHOL 204 (H) 03/02/2023    CHOL 224 (H) 04/03/2021    CHOL 285 (H) 01/14/2020    CHOL 257 (H) 12/27/2018    CHOL 270 (H) 10/10/2013     Lab Results   Component Value Date    TRIG 121 03/02/2023    TRIG 150 (H) 04/03/2021    TRIG 163 (H) 01/14/2020    TRIG 296 (H) 12/27/2018    TRIG 240 (H) 10/10/2013     Lab Results   Component Value Date    HDL 48 03/02/2023    HDL 63 04/03/2021    HDL 59 01/14/2020    HDL 50 12/27/2018    HDL 52.0 10/10/2013     No components found for: [facility]  Lab Results   Component Value Date    LABVLDL 24 03/02/2023    LABVLDL 30 04/03/2021    LABVLDL 33 01/14/2020    LABVLDL 59 12/27/2018       Rowdy Gonzales RN
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
## 2023-03-06 NOTE — CARE COORDINATION
STERLING spoke with pt who stated he did complete the phone intake with the [address] [place] [address]. Pt reports after he answered all of the questions they put him on hold for 20 minutes. Pt reports he did have to terminate the phone call due to needing to use the restroom. Pt does not know if he he has been accepted or denied. SW contacted the [facility] . No answer, a voicemail was left. UPDATE: STERLING was advised by RN that the [address] [place] [address] has approved pt to come to their facility today.

## 2023-03-06 NOTE — PROGRESS NOTES
CLINICAL PHARMACY NOTE: MEDS TO BEDS    Total # of Prescriptions Filled: 3   The following medications were delivered to the patient:  Sertraline 50 mg  Divalproex 250 mg  Divalproex 500 mg    Additional Documentation:     Delivered to RN on the unit @12:15

## 2023-03-06 NOTE — GROUP NOTE
Group Therapy Note    Date: 3/6/2023    Group Start Time: 1400  Group End Time: 1430  Group Topic: Cognitive Skills    [facility]    AYESHA Patel LSW        Group Therapy Note    Attendees: 11       Patient's Goal: To participate in group discussion on values. Notes: Pt was an active participant in group discussion. Status After Intervention:  Improved    Participation Level:  Active Listener and Interactive    Participation Quality: Appropriate, Attentive, Sharing, and Supportive      Speech:  normal      Thought Process/Content: Logical      Affective Functioning: Congruent      Mood: anxious      Level of consciousness:  Alert and Oriented x4      Response to Learning: Able to verbalize current knowledge/experience      Endings: None Reported    Modes of Intervention: Education, Support, Socialization, Exploration, Clarifying, and Problem-solving      Discipline Responsible: /Counselor      Signature:  AYESHA Stephen LSW

## 2023-03-06 NOTE — PROGRESS NOTES
[facility] Interdisciplinary Treatment Plan Note     Review Date & Time: 3/6/2023 0900    Patient was in treatment team.    Estimated Length of Stay Update:  7-10 days   Estimated Discharge Date Update: 3/9/2023    EDUCATION:   Learner Progress Toward Treatment Goals: Reviewed results and recommendations of this team, Reviewed group plan and strategies, and Reviewed signs, symptoms and risk of self harm and violent behavior    Method: Small group    Outcome: Verbalized understanding    PATIENT GOALS: try to get a hold of someone from home    PLAN/TREATMENT RECOMMENDATIONS UPDATE: encourage patient to attend and participate in groups.  Take medications as prescribed     GOALS UPDATE:  Time frame for Short-Term Goals: prior to discharge      Sandra Jackson RN

## 2023-03-06 NOTE — PROGRESS NOTES
Attempted to call [facility] for patient placement. No answer at this time. Will continue to attempt for patient discharge.

## 2023-03-06 NOTE — CARE COORDINATION
SW met with pt to discuss his discharge. Pt reports feeling a little agitated because he cannot get a hold of anyone from his Jehovah's witness. Pt reports feeling ok other than that. Pt denied SI/HI/AVH. Pt is agreeable to go to the [facility] at time of discharge until he can get a  through [employer]. Pt will continue to treat with [facility] at time of discharge. Pt denied signing an ANAI at this time. Pt stated he knows how to take the bus and he can take the bus to the [address] [place] [address]. Pt is aware that a copy of his photo ID and insurance card can be provided to him. Pt expressed feeling ready to discharge today. Pt cooperative, future focused, with good eye contact, clear speech, improving insight/judgement. SW provided pt with the phone number and encouraged him to complete the phone intake. SW following. A copy of pt photo ID and insurance card was placed in pt soft chart.

## 2023-03-06 NOTE — PLAN OF CARE
Patient resting quietly in room denies SI/HI AVH, rates anxiety 8/10 and depression 0/10. Patient is smiling and friendly isolating to room this evening. Patient compliant with medications and groups and is discharged focused. Will continue to monitor and assess q 15 min for safety throughout the shift. Problem: Anxiety  Goal: Will report anxiety at manageable levels  Description: INTERVENTIONS:  1. Administer medication as ordered  2. Teach and rehearse alternative coping skills  3. Provide emotional support with 1:1 interaction with staff  Outcome: Progressing     Problem: Coping  Goal: Pt/Family able to verbalize concerns and demonstrate effective coping strategies  Description: INTERVENTIONS:  1. Assist patient/family to identify coping skills, available support systems and cultural and spiritual values  2. Provide emotional support, including active listening and acknowledgement of concerns of patient and caregivers  3. Reduce environmental stimuli, as able  4. Instruct patient/family in relaxation techniques, as appropriate  5. Assess for spiritual pain/suffering and initiate Spiritual Care, Psychosocial Clinical Specialist consults as needed  Outcome: Progressing     Problem: Behavior  Goal: Pt/Family maintain appropriate behavior and adhere to behavioral management agreement, if implemented  Description: INTERVENTIONS:  1. Assess patient/family's coping skills and  non-compliant behavior (including use of illegal substances)  2. Notify security of behavior or suspected illegal substances which indicate the need for search of the family and/or belongings  3. Encourage verbalization of thoughts and concerns in a socially appropriate manner  4. Utilize positive, consistent limit setting strategies supporting safety of patient, staff and others  5. Encourage participation in the decision making process about the behavioral management agreement  6.  If a visitor's behavior poses a threat to safety call refer to organization policy. 7. Initiate consult with , Psychosocial CNS, Spiritual Care as appropriate  Outcome: Progressing     Problem: Involuntary Admit  Goal: Will cooperate with staff recommendations and doctor's orders and will demonstrate appropriate behavior  Description: INTERVENTIONS:  1. Treat underlying conditions and offer medication as ordered  2. Educate regarding involuntary admission procedures and rules  3.  Contain excessive/inappropriate behavior per unit and hospital policies  Outcome: Progressing     Problem: Pain  Goal: Verbalizes/displays adequate comfort level or baseline comfort level  Outcome: Progressing  Flowsheets (Taken 3/3/2023 0820 by Tu Ferrera RN)  Verbalizes/displays adequate comfort level or baseline comfort level: Encourage patient to monitor pain and request assistance

## 2023-03-06 NOTE — PLAN OF CARE
Patient denies SI/HI/Hallucinations. Patient medication compliant. Agreeable to go to [facility] at discharge attempting to get ahold of [employer]. Patient in no active distress, respirations even and unlabored. Will continue to monitor and will intervene as needed. Problem: Anxiety  Goal: Will report anxiety at manageable levels  Description: INTERVENTIONS:  1. Administer medication as ordered  2. Teach and rehearse alternative coping skills  3. Provide emotional support with 1:1 interaction with staff  Outcome: Progressing     Problem: Coping  Goal: Pt/Family able to verbalize concerns and demonstrate effective coping strategies  Description: INTERVENTIONS:  1. Assist patient/family to identify coping skills, available support systems and cultural and spiritual values  2. Provide emotional support, including active listening and acknowledgement of concerns of patient and caregivers  3. Reduce environmental stimuli, as able  4. Instruct patient/family in relaxation techniques, as appropriate  5. Assess for spiritual pain/suffering and initiate Spiritual Care, Psychosocial Clinical Specialist consults as needed  Outcome: Progressing     Problem: Behavior  Goal: Pt/Family maintain appropriate behavior and adhere to behavioral management agreement, if implemented  Description: INTERVENTIONS:  1. Assess patient/family's coping skills and  non-compliant behavior (including use of illegal substances)  2. Notify security of behavior or suspected illegal substances which indicate the need for search of the family and/or belongings  3. Encourage verbalization of thoughts and concerns in a socially appropriate manner  4. Utilize positive, consistent limit setting strategies supporting safety of patient, staff and others  5. Encourage participation in the decision making process about the behavioral management agreement  6.  If a visitor's behavior poses a threat to safety call refer to organization policy. 7. Initiate consult with , Psychosocial CNS, Spiritual Care as appropriate  Outcome: Progressing     Problem: Involuntary Admit  Goal: Will cooperate with staff recommendations and doctor's orders and will demonstrate appropriate behavior  Description: INTERVENTIONS:  1. Treat underlying conditions and offer medication as ordered  2. Educate regarding involuntary admission procedures and rules  3.  Contain excessive/inappropriate behavior per unit and hospital policies  Outcome: Progressing

## 2023-03-06 NOTE — PROGRESS NOTES
Per patient patient he attempted to call [address] [place] [address] for intake questions no answer when attempting to call. Will continue to encourage to call for intake questions.

## 2023-03-06 NOTE — DISCHARGE INSTRUCTIONS
Follow up for Tobacco Cessation at:    [facility]                                 Date:  Friday 3/10 at [address]  [place],  [address]   ([address]    take [address] elevators to 7th floor)   Phone: [phone]   Fax: [phone]

## 2023-03-13 ENCOUNTER — OFFICE VISIT (OUTPATIENT)
Dept: INTERNAL MEDICINE | Age: 53
End: 2023-03-13
Payer: MEDICAID

## 2023-03-13 ENCOUNTER — HOSPITAL ENCOUNTER (OUTPATIENT)
Age: 53
Discharge: HOME | End: 2023-03-13
Payer: MEDICAID

## 2023-03-13 VITALS
BODY MASS INDEX: 29.51 KG/M2 | DIASTOLIC BLOOD PRESSURE: 68 MMHG | HEART RATE: 108 BPM | OXYGEN SATURATION: 96 % | HEIGHT: 61 IN | TEMPERATURE: 98 F | WEIGHT: 156.3 LBS | SYSTOLIC BLOOD PRESSURE: 141 MMHG | RESPIRATION RATE: 16 BRPM

## 2023-03-13 DIAGNOSIS — I10 PRIMARY HYPERTENSION: ICD-10-CM

## 2023-03-13 DIAGNOSIS — E78.2 MIXED HYPERLIPIDEMIA: ICD-10-CM

## 2023-03-13 DIAGNOSIS — J30.2 SEASONAL ALLERGIES: Primary | ICD-10-CM

## 2023-03-13 DIAGNOSIS — Z12.11 COLON CANCER SCREENING: ICD-10-CM

## 2023-03-13 DIAGNOSIS — K59.00 CONSTIPATION, UNSPECIFIED CONSTIPATION TYPE: ICD-10-CM

## 2023-03-13 DIAGNOSIS — K21.9 GASTROESOPHAGEAL REFLUX DISEASE WITHOUT ESOPHAGITIS: ICD-10-CM

## 2023-03-13 LAB — VALPROIC ACID LEVEL: 73 MCG/ML (ref 50–100)

## 2023-03-13 PROCEDURE — 99212 OFFICE O/P EST SF 10 MIN: CPT

## 2023-03-13 PROCEDURE — 3074F SYST BP LT 130 MM HG: CPT

## 2023-03-13 PROCEDURE — G8419 CALC BMI OUT NRM PARAM NOF/U: HCPCS

## 2023-03-13 PROCEDURE — 36415 COLL VENOUS BLD VENIPUNCTURE: CPT

## 2023-03-13 PROCEDURE — 80164 ASSAY DIPROPYLACETIC ACD TOT: CPT

## 2023-03-13 PROCEDURE — 1036F TOBACCO NON-USER: CPT

## 2023-03-13 PROCEDURE — 99214 OFFICE O/P EST MOD 30 MIN: CPT

## 2023-03-13 PROCEDURE — 3078F DIAST BP <80 MM HG: CPT

## 2023-03-13 PROCEDURE — G8484 FLU IMMUNIZE NO ADMIN: HCPCS

## 2023-03-13 PROCEDURE — 3017F COLORECTAL CA SCREEN DOC REV: CPT

## 2023-03-13 PROCEDURE — G8427 DOCREV CUR MEDS BY ELIG CLIN: HCPCS

## 2023-03-13 PROCEDURE — 1111F DSCHRG MED/CURRENT MED MERGE: CPT

## 2023-03-13 RX ORDER — PANTOPRAZOLE SODIUM 40 MG/1
TABLET, DELAYED RELEASE ORAL
Qty: 90 TABLET | Refills: 1 | Status: SHIPPED | OUTPATIENT
Start: 2023-03-13

## 2023-03-13 RX ORDER — AMOXICILLIN 250 MG
2 CAPSULE ORAL NIGHTLY
Qty: 60 TABLET | Refills: 1 | Status: SHIPPED | OUTPATIENT
Start: 2023-03-13

## 2023-03-13 RX ORDER — AMLODIPINE BESYLATE 5 MG/1
5 TABLET ORAL DAILY
Qty: 90 TABLET | Refills: 1 | Status: SHIPPED | OUTPATIENT
Start: 2023-03-13

## 2023-03-13 RX ORDER — LANOLIN ALCOHOL/MO/W.PET/CERES
3 CREAM (GRAM) TOPICAL NIGHTLY
Qty: 30 TABLET | Refills: 0 | Status: SHIPPED | OUTPATIENT
Start: 2023-04-03

## 2023-03-13 RX ORDER — DIVALPROEX SODIUM 500 MG/1
500 TABLET, DELAYED RELEASE ORAL NIGHTLY
Qty: 30 TABLET | Refills: 0 | Status: SHIPPED | OUTPATIENT
Start: 2023-04-03 | End: 2023-05-03

## 2023-03-13 RX ORDER — DIVALPROEX SODIUM 250 MG/1
250 TABLET, DELAYED RELEASE ORAL DAILY
Qty: 30 TABLET | Refills: 0 | Status: SHIPPED | OUTPATIENT
Start: 2023-04-03 | End: 2023-05-03

## 2023-03-13 RX ORDER — ATORVASTATIN CALCIUM 40 MG/1
40 TABLET, FILM COATED ORAL DAILY
Qty: 90 TABLET | Refills: 1 | Status: SHIPPED | OUTPATIENT
Start: 2023-03-13

## 2023-03-13 RX ORDER — LORATADINE 10 MG/1
10 TABLET ORAL DAILY
Qty: 30 TABLET | Refills: 1 | Status: SHIPPED | OUTPATIENT
Start: 2023-03-13

## 2023-03-13 ASSESSMENT — ENCOUNTER SYMPTOMS
CHEST TIGHTNESS: 0
BLOOD IN STOOL: 0
VOMITING: 0
NAUSEA: 0
SORE THROAT: 0
SHORTNESS OF BREATH: 0
CONSTIPATION: 0
DIARRHEA: 0
ABDOMINAL PAIN: 0
COUGH: 0

## 2023-03-13 ASSESSMENT — PATIENT HEALTH QUESTIONNAIRE - PHQ9
SUM OF ALL RESPONSES TO PHQ QUESTIONS 1-9: 6
4. FEELING TIRED OR HAVING LITTLE ENERGY: 1
9. THOUGHTS THAT YOU WOULD BE BETTER OFF DEAD, OR OF HURTING YOURSELF: 0
8. MOVING OR SPEAKING SO SLOWLY THAT OTHER PEOPLE COULD HAVE NOTICED. OR THE OPPOSITE, BEING SO FIGETY OR RESTLESS THAT YOU HAVE BEEN MOVING AROUND A LOT MORE THAN USUAL: 0
SUM OF ALL RESPONSES TO PHQ9 QUESTIONS 1 & 2: 2
SUM OF ALL RESPONSES TO PHQ QUESTIONS 1-9: 6
5. POOR APPETITE OR OVEREATING: 0
3. TROUBLE FALLING OR STAYING ASLEEP: 1
2. FEELING DOWN, DEPRESSED OR HOPELESS: 0
7. TROUBLE CONCENTRATING ON THINGS, SUCH AS READING THE NEWSPAPER OR WATCHING TELEVISION: 2
SUM OF ALL RESPONSES TO PHQ QUESTIONS 1-9: 6
SUM OF ALL RESPONSES TO PHQ QUESTIONS 1-9: 6
6. FEELING BAD ABOUT YOURSELF - OR THAT YOU ARE A FAILURE OR HAVE LET YOURSELF OR YOUR FAMILY DOWN: 0
1. LITTLE INTEREST OR PLEASURE IN DOING THINGS: 2
10. IF YOU CHECKED OFF ANY PROBLEMS, HOW DIFFICULT HAVE THESE PROBLEMS MADE IT FOR YOU TO DO YOUR WORK, TAKE CARE OF THINGS AT HOME, OR GET ALONG WITH OTHER PEOPLE: 0

## 2023-03-13 NOTE — PROGRESS NOTES
[facility]      SUBJECTIVE:  Yadi Lamb (:  1970) is a 46 y.o. male here for evaluation of the following chief complaint(s): Allergies (Wants something ordered for his allergies)    Complaint of sniffles, he is concerned this is due to allergies. Sniffling has been present for a few months. He does not have any postnasal drip, eye itching, or sneezing. He also has abnormal mouth and tongue movements. He states that he previously tried a medication for the abnormal movement but had no improvement. He is understanding that this sniffing is likely related to the abnormal movements of his mouth and tongue but would still like to try an allergy medication. Dry face under his beard and around his nose. He states he tried cortisone cream in the past, and it did not help. He has not been using any type of moisturizer. He is going to try shaving his beard to see if there is improvement. Concern for bowel obstruction. He was previously admitted for a bowel obstruction. He is passing gas and had a small BM yesterday. He has to strain to use the bathroom. He is interested in a referral for screening colonoscopy. Review of Systems   Constitutional:  Negative for chills and fever. HENT:  Negative for congestion, postnasal drip and sore throat. Respiratory:  Negative for cough, chest tightness and shortness of breath. Cardiovascular:  Negative for chest pain, palpitations and leg swelling. Gastrointestinal:  Negative for abdominal pain, blood in stool, constipation, diarrhea, nausea and vomiting. Genitourinary:  Negative for difficulty urinating, dysuria and hematuria. Skin:  Negative for rash.      Current Outpatient Medications on File Prior to Visit   Medication Sig Dispense Refill    divalproex (DEPAKOTE) 250 MG DR tablet Take 1 tablet by mouth daily 30 tablet 0    sertraline (ZOLOFT) 50 MG tablet Take 1 tablet by mouth daily 30 tablet 0    divalproex (DEPAKOTE) 500 MG DR tablet Take 1 tablet by mouth at bedtime 30 tablet 0    melatonin 3 MG TABS tablet Take 1 tablet by mouth nightly  3    ibuprofen (ADVIL;MOTRIN) 400 MG tablet Take 1 tablet by mouth every 8 hours as needed for Pain 30 tablet 0    pantoprazole (PROTONIX) 40 MG tablet TAKE 1 TABLET BY MOUTH EVERY MORNING BEFORE BREAKFAST (Patient not taking: Reported on 3/13/2023) 90 tablet 1    atorvastatin (LIPITOR) 40 MG tablet Take 1 tablet by mouth daily (Patient not taking: Reported on 3/13/2023) 90 tablet 1    senna-docusate (PERICOLACE) 8.6-50 MG per tablet Take 2 tablets by mouth nightly (Patient not taking: Reported on 3/13/2023) 60 tablet 3    polyethylene glycol (MIRALAX) 17 GM/SCOOP powder Take 17 g by mouth daily (Patient not taking: Reported on 3/13/2023) 238 g 0    albuterol sulfate HFA (VENTOLIN HFA) 108 (90 Base) MCG/ACT inhaler Inhale 2 puffs into the lungs 4 times daily as needed for Wheezing (Patient not taking: Reported on 3/13/2023) 1 Inhaler 0    traZODone (DESYREL) 100 MG tablet Take 100 mg by mouth in the morning and at bedtime (Patient not taking: Reported on 3/13/2023)      acetaminophen (TYLENOL) 500 MG tablet Take 1 tablet by mouth 4 times daily as needed for Pain (Patient not taking: Reported on 3/13/2023) 60 tablet 1     No current facility-administered medications on file prior to visit. OBJECTIVE:    VS:   Vitals:    03/13/23 0923 03/13/23 0931   BP: (!) 154/73 (!) 141/68   Site: Right Upper Arm    Position: Sitting    Cuff Size: Medium Adult    Pulse: (!) 108    Resp: 16    Temp: 98 °F (36.7 °C)    TempSrc: Temporal    SpO2: 96%    Weight: 156 lb 4.8 oz (70.9 kg)    Height: 5' 1\" (1.549 m)      Physical Exam  Constitutional:       Appearance: Normal appearance. HENT:      Head: Normocephalic and atraumatic. Nose:      Comments: Constant sniffling. No nasal drainage/mucus.       Mouth/Throat:      Comments: Mouth and tongue movements consistent with tardive dyskinesia. Cardiovascular:      Rate and Rhythm: Normal rate and regular rhythm. Pulses: Normal pulses. Heart sounds: Normal heart sounds. Pulmonary:      Effort: Pulmonary effort is normal.      Breath sounds: Normal breath sounds. Abdominal:      General: Abdomen is flat. Palpations: Abdomen is soft. Musculoskeletal:      Right lower leg: No edema. Left lower leg: No edema. Skin:     General: Skin is warm and dry. Neurological:      Mental Status: He is alert. Psychiatric:         Mood and Affect: Mood normal.         Behavior: Behavior normal.         Thought Content: Thought content normal.         Judgment: Judgment normal.          ASSESSMENT/PLAN:  1. Seasonal allergies  2. Gastroesophageal reflux disease without esophagitis  The following orders have not been finalized:  -     pantoprazole (PROTONIX) 40 MG tablet  3. Mixed hyperlipidemia  The following orders have not been finalized:  -     atorvastatin (LIPITOR) 40 MG tablet  4. Constipation, unspecified constipation type  The following orders have not been finalized:  -     senna-docusate (PERICOLACE) 8.6-50 MG per tablet     Sniffles  - likely due to tardive kinesia (has abnormal tongue and mouth movements as well), no postnasal drip, no itchy eyes   - Patient concerned may be due to allergies   - Trial Claritin     Dry skin on face  - Did not improve with cortisone previously   - Recommend moisturizing cream and shaving     Bipolar disorder  - Follows with psych   - Denies depressive thoughts, SI, HI today     Hypertension  - start amlodipine 5mg daily     Hyperlipidemia  - continue lipitor    Constipation  - passing gas, BM yesterday  - Continue pericolace    Referred to general surgery for screening colonoscopy    RTC:  No follow-ups on file. I have reviewed my findings and recommendations with Jl Mills and Dr. Bird Natarajan.     Ata Medina MD   3/13/2023 10:00 AM

## 2023-03-13 NOTE — PATIENT INSTRUCTIONS
Changes in Medications  Start amlodipine 5 mg daily for high blood pressure   Start Claritin for possible allergies    Continue taking pericolace for constipation   Use a moisturizing lotion for the dry skin on your face     Referrals   General surgery (Dr. Lucile Siemens) for screening colonoscopy      Please follow up Dr. Janis Sanchez with our clinic. Please call if your symptoms worsen or fail to improve.

## 2023-03-13 NOTE — PROGRESS NOTES
[facility]  [facility]    Attending Physician Statement  I have discussed the case, including pertinent history and exam findings with the resident physician. I have seen and examined the patient and the key elements of the encounter have been performed by me. I agree with the assessment, plan and orders as documented by the resident. I have reviewed all pertinent PMHx, PSHx, FamHx, SocialHx, medications, and allergies and updated history as appropriate. Patient here for routine follow up of medical problems. Depression  -following with psychiatrist and psychologist   -continue current regimen per their recs   -refill of his depakote, melatonin, and sertraline for 1 month 2/2 patient running out priro to followup    HLD: continue statin   The 10-year ASCVD risk score (Wesly HENRY, et al., 2019) is: 5.3%    Values used to calculate the score:      Age: 46 years      Sex: Male      Is Non- : No      Diabetic: No      Tobacco smoker: No      Systolic Blood Pressure: 439 mmHg      Is BP treated: No      HDL Cholesterol: 48 mg/dL      Total Cholesterol: 204 mg/dL    Dry skin face- trial moisturizer    HTN- amlodipine 5 mg starting     Allergies  -trial claritin    HCM  -colonoscopy referral     Remainder of medical problems as per resident note.     [address] Henry DO  3/13/2023 9:47 AM    Encounter time including independent chart review, discussion with patient, interpreting test results and/or external communications: 30'

## 2023-04-13 PROBLEM — R01.1 SYSTOLIC EJECTION MURMUR: Status: ACTIVE | Noted: 2023-04-13

## 2023-04-13 PROBLEM — K56.609 SMALL BOWEL OBSTRUCTION (HCC): Status: RESOLVED | Noted: 2022-11-07 | Resolved: 2023-04-13

## 2023-04-14 PROBLEM — Z12.11 SCREENING FOR COLON CANCER: Status: ACTIVE | Noted: 2023-04-14

## 2023-05-14 PROBLEM — Z12.11 SCREENING FOR COLON CANCER: Status: RESOLVED | Noted: 2023-04-14 | Resolved: 2023-05-14

## 2023-05-18 ENCOUNTER — HOSPITAL ENCOUNTER (OUTPATIENT)
Age: 53
Discharge: HOME | End: 2023-05-18
Payer: MEDICAID

## 2023-05-18 ENCOUNTER — OFFICE VISIT (OUTPATIENT)
Dept: INTERNAL MEDICINE | Age: 53
End: 2023-05-18
Payer: MEDICAID

## 2023-05-18 VITALS
HEART RATE: 100 BPM | HEIGHT: 61 IN | OXYGEN SATURATION: 97 % | SYSTOLIC BLOOD PRESSURE: 120 MMHG | TEMPERATURE: 97.1 F | BODY MASS INDEX: 30.98 KG/M2 | DIASTOLIC BLOOD PRESSURE: 58 MMHG | RESPIRATION RATE: 18 BRPM | WEIGHT: 164.1 LBS

## 2023-05-18 DIAGNOSIS — E78.5 HYPERLIPIDEMIA, UNSPECIFIED HYPERLIPIDEMIA TYPE: ICD-10-CM

## 2023-05-18 DIAGNOSIS — R01.1 SYSTOLIC EJECTION MURMUR: ICD-10-CM

## 2023-05-18 DIAGNOSIS — R26.89 IMBALANCE: ICD-10-CM

## 2023-05-18 DIAGNOSIS — R26.89 IMBALANCE: Primary | ICD-10-CM

## 2023-05-18 LAB
CHOLESTEROL, TOTAL: 183 MG/DL (ref 0–199)
FOLATE SERPL-MCNC: 19.2 NG/ML (ref 4.8–24.2)
HDLC SERPL-MCNC: 49 MG/DL
LDLC SERPL CALC-MCNC: 98 MG/DL (ref 0–99)
TRIGL SERPL-MCNC: 178 MG/DL (ref 0–149)
TSH SERPL-MCNC: 4.16 UIU/ML (ref 0.27–4.2)
VIT B12 SERPL-MCNC: 471 PG/ML (ref 211–946)
VLDLC SERPL CALC-MCNC: 36 MG/DL

## 2023-05-18 PROCEDURE — 3078F DIAST BP <80 MM HG: CPT | Performed by: STUDENT IN AN ORGANIZED HEALTH CARE EDUCATION/TRAINING PROGRAM

## 2023-05-18 PROCEDURE — 80061 LIPID PANEL: CPT

## 2023-05-18 PROCEDURE — 36415 COLL VENOUS BLD VENIPUNCTURE: CPT

## 2023-05-18 PROCEDURE — G8417 CALC BMI ABV UP PARAM F/U: HCPCS | Performed by: STUDENT IN AN ORGANIZED HEALTH CARE EDUCATION/TRAINING PROGRAM

## 2023-05-18 PROCEDURE — 1036F TOBACCO NON-USER: CPT | Performed by: STUDENT IN AN ORGANIZED HEALTH CARE EDUCATION/TRAINING PROGRAM

## 2023-05-18 PROCEDURE — 3017F COLORECTAL CA SCREEN DOC REV: CPT | Performed by: STUDENT IN AN ORGANIZED HEALTH CARE EDUCATION/TRAINING PROGRAM

## 2023-05-18 PROCEDURE — 82607 VITAMIN B-12: CPT

## 2023-05-18 PROCEDURE — G8427 DOCREV CUR MEDS BY ELIG CLIN: HCPCS | Performed by: STUDENT IN AN ORGANIZED HEALTH CARE EDUCATION/TRAINING PROGRAM

## 2023-05-18 PROCEDURE — 99214 OFFICE O/P EST MOD 30 MIN: CPT | Performed by: STUDENT IN AN ORGANIZED HEALTH CARE EDUCATION/TRAINING PROGRAM

## 2023-05-18 PROCEDURE — 82746 ASSAY OF FOLIC ACID SERUM: CPT

## 2023-05-18 PROCEDURE — 3074F SYST BP LT 130 MM HG: CPT | Performed by: STUDENT IN AN ORGANIZED HEALTH CARE EDUCATION/TRAINING PROGRAM

## 2023-05-18 PROCEDURE — 99213 OFFICE O/P EST LOW 20 MIN: CPT | Performed by: STUDENT IN AN ORGANIZED HEALTH CARE EDUCATION/TRAINING PROGRAM

## 2023-05-18 PROCEDURE — 84443 ASSAY THYROID STIM HORMONE: CPT

## 2023-05-18 ASSESSMENT — ENCOUNTER SYMPTOMS
DIARRHEA: 0
COUGH: 0
NAUSEA: 0
EYE DISCHARGE: 0
WHEEZING: 0
TROUBLE SWALLOWING: 0
CHOKING: 0
VOMITING: 0
CONSTIPATION: 0
SHORTNESS OF BREATH: 0

## 2023-05-19 ENCOUNTER — TELEPHONE (OUTPATIENT)
Dept: INTERNAL MEDICINE | Age: 53
End: 2023-05-19

## 2023-05-26 ENCOUNTER — TELEPHONE (OUTPATIENT)
Dept: SURGERY | Age: 53
End: 2023-05-26

## 2023-05-26 NOTE — TELEPHONE ENCOUNTER
MA reviewed pt's chart and found that he has not yet completed echo/bubble study ordered by PCP to obtain clearance for scopes on 6/5/23. MA spoke with Dr. Michelle Linares, pt will need to cancel scopes until this has been completed and pt has been properly cleared. MA attempted to contact pt but got his voicemail, MA left a detailed message and also canceled procedure with surgery scheduling for now. MA awaiting return call from pt. Electronically signed by Mary Womack on 5/26/2023 at 10:52 AM    Reviewed MA's note above.     Electronically signed by Luciano Davies MD on 5/26/23 at 2:11 PM EDT

## 2023-05-26 NOTE — TELEPHONE ENCOUNTER
MA reviewed pt's chart and found that he has not yet completed echo/bubble study ordered by PCP to obtain clearance for scopes on 6/5/23. MA spoke with Dr. Renita Miller, pt will need to cancel scopes until this has been completed and pt has been properly cleared. MA attempted to contact pt but got his voicemail, MA left a detailed message and also canceled procedure with surgery scheduling for now. MA awaiting return call from pt.   Electronically signed by Thor Mohs on 5/26/2023 at 10:52 AM

## 2023-06-01 ENCOUNTER — TELEPHONE (OUTPATIENT)
Dept: SURGERY | Age: 53
End: 2023-06-01

## 2023-06-01 ENCOUNTER — TELEPHONE (OUTPATIENT)
Dept: INTERNAL MEDICINE | Age: 53
End: 2023-06-01

## 2023-06-01 NOTE — TELEPHONE ENCOUNTER
MA reviewed pt's chart and found that he has not yet completed echo/bubble study ordered by PCP to obtain clearance for scopes on 6/5/23. MA spoke with Dr. Tyshawn Young, pt will need to cancel scopes until this has been completed and pt has been properly cleared. MA attempted to contact pt but got his voicemail, MA left a detailed message and also canceled procedure with surgery scheduling for now. MA awaiting return call from pt. - This was the second attempt to contact pt in regards to canceled procedure.   Electronically signed by Ghislaine Vail on 6/1/2023 at 4:00 PM

## 2023-06-02 ENCOUNTER — TELEPHONE (OUTPATIENT)
Dept: SURGERY | Age: 53
End: 2023-06-02

## 2023-06-02 NOTE — TELEPHONE ENCOUNTER
Pt returned call and verbalize understanding that scores are canceled until he received cardiac clearance and completes other testing that was ordered.   Electronically signed by Aric Larson on 6/2/2023 at 4:27 PM

## 2023-06-21 ENCOUNTER — HOSPITAL ENCOUNTER (OUTPATIENT)
Dept: MRI IMAGING | Age: 53
Discharge: HOME | End: 2023-06-23
Payer: MEDICAID

## 2023-06-21 DIAGNOSIS — R26.89 IMBALANCE: ICD-10-CM

## 2023-06-21 PROCEDURE — 6360000004 HC RX CONTRAST MEDICATION: Performed by: RADIOLOGY

## 2023-06-21 PROCEDURE — A9577 INJ MULTIHANCE: HCPCS | Performed by: RADIOLOGY

## 2023-06-21 PROCEDURE — 70553 MRI BRAIN STEM W/O & W/DYE: CPT

## 2023-06-21 RX ADMIN — GADOBENATE DIMEGLUMINE 15 ML: 529 INJECTION, SOLUTION INTRAVENOUS at 17:46

## 2023-06-23 ENCOUNTER — TELEPHONE (OUTPATIENT)
Dept: INTERNAL MEDICINE | Age: 53
End: 2023-06-23

## 2023-06-23 DIAGNOSIS — R26.89 IMBALANCE: Primary | ICD-10-CM

## 2023-06-23 NOTE — TELEPHONE ENCOUNTER
Called patient and informed about the MRI results and meningioma. Patient is advised to follow up with neurology (referral made on previous) and neurosurgery referral.     All questions answered.        Electronically signed by Ashley Cohen MD on 6/23/2023 at 10:00 AM

## 2023-06-30 ENCOUNTER — OFFICE VISIT (OUTPATIENT)
Dept: NEUROSURGERY | Age: 53
End: 2023-06-30
Payer: MEDICAID

## 2023-06-30 VITALS
WEIGHT: 132 LBS | OXYGEN SATURATION: 98 % | SYSTOLIC BLOOD PRESSURE: 129 MMHG | BODY MASS INDEX: 24.92 KG/M2 | DIASTOLIC BLOOD PRESSURE: 60 MMHG | HEIGHT: 61 IN | HEART RATE: 93 BPM

## 2023-06-30 DIAGNOSIS — R26.89 IMBALANCE: Primary | ICD-10-CM

## 2023-06-30 DIAGNOSIS — D32.9 MENINGIOMA (HCC): ICD-10-CM

## 2023-06-30 PROCEDURE — 99202 OFFICE O/P NEW SF 15 MIN: CPT

## 2023-06-30 PROCEDURE — 1036F TOBACCO NON-USER: CPT | Performed by: NEUROLOGICAL SURGERY

## 2023-06-30 PROCEDURE — G8427 DOCREV CUR MEDS BY ELIG CLIN: HCPCS | Performed by: NEUROLOGICAL SURGERY

## 2023-06-30 PROCEDURE — 99204 OFFICE O/P NEW MOD 45 MIN: CPT | Performed by: NEUROLOGICAL SURGERY

## 2023-06-30 PROCEDURE — 3017F COLORECTAL CA SCREEN DOC REV: CPT | Performed by: NEUROLOGICAL SURGERY

## 2023-06-30 PROCEDURE — 3078F DIAST BP <80 MM HG: CPT | Performed by: NEUROLOGICAL SURGERY

## 2023-06-30 PROCEDURE — 3074F SYST BP LT 130 MM HG: CPT | Performed by: NEUROLOGICAL SURGERY

## 2023-06-30 PROCEDURE — G8420 CALC BMI NORM PARAMETERS: HCPCS | Performed by: NEUROLOGICAL SURGERY

## 2023-07-25 ENCOUNTER — OFFICE VISIT (OUTPATIENT)
Dept: INTERNAL MEDICINE | Age: 53
End: 2023-07-25
Payer: MEDICAID

## 2023-07-25 VITALS
RESPIRATION RATE: 20 BRPM | WEIGHT: 156 LBS | HEIGHT: 61 IN | OXYGEN SATURATION: 99 % | BODY MASS INDEX: 29.45 KG/M2 | DIASTOLIC BLOOD PRESSURE: 78 MMHG | HEART RATE: 98 BPM | SYSTOLIC BLOOD PRESSURE: 128 MMHG | TEMPERATURE: 98 F

## 2023-07-25 DIAGNOSIS — I10 ESSENTIAL HYPERTENSION: Primary | ICD-10-CM

## 2023-07-25 DIAGNOSIS — E78.2 MIXED HYPERLIPIDEMIA: ICD-10-CM

## 2023-07-25 DIAGNOSIS — R01.1 SYSTOLIC EJECTION MURMUR: ICD-10-CM

## 2023-07-25 DIAGNOSIS — Z23 NEED FOR SHINGLES VACCINE: ICD-10-CM

## 2023-07-25 PROCEDURE — 99212 OFFICE O/P EST SF 10 MIN: CPT

## 2023-07-25 PROCEDURE — 3017F COLORECTAL CA SCREEN DOC REV: CPT

## 2023-07-25 PROCEDURE — 3078F DIAST BP <80 MM HG: CPT

## 2023-07-25 PROCEDURE — G8427 DOCREV CUR MEDS BY ELIG CLIN: HCPCS

## 2023-07-25 PROCEDURE — 3074F SYST BP LT 130 MM HG: CPT

## 2023-07-25 PROCEDURE — 1036F TOBACCO NON-USER: CPT

## 2023-07-25 PROCEDURE — 99213 OFFICE O/P EST LOW 20 MIN: CPT

## 2023-07-25 PROCEDURE — G8417 CALC BMI ABV UP PARAM F/U: HCPCS

## 2023-07-25 RX ORDER — ATORVASTATIN CALCIUM 40 MG/1
40 TABLET, FILM COATED ORAL DAILY
Qty: 90 TABLET | Refills: 1 | Status: SHIPPED | OUTPATIENT
Start: 2023-07-25

## 2023-07-25 RX ORDER — AMLODIPINE BESYLATE 5 MG/1
5 TABLET ORAL DAILY
Qty: 90 TABLET | Refills: 1 | Status: SHIPPED | OUTPATIENT
Start: 2023-07-25

## 2023-07-25 RX ORDER — ZOSTER VACCINE RECOMBINANT, ADJUVANTED 50 MCG/0.5
0.5 KIT INTRAMUSCULAR SEE ADMIN INSTRUCTIONS
Qty: 0.5 ML | Refills: 0 | Status: SHIPPED | OUTPATIENT
Start: 2023-07-25 | End: 2024-01-21

## 2023-07-25 ASSESSMENT — ENCOUNTER SYMPTOMS
NAUSEA: 0
BACK PAIN: 0
CONSTIPATION: 0
DIARRHEA: 0
SHORTNESS OF BREATH: 0
BLOOD IN STOOL: 0
ABDOMINAL PAIN: 0
COUGH: 0
VOMITING: 0

## 2023-07-25 NOTE — PROGRESS NOTES
Called [facility] and they only have psych medication.  Left voicemail with hospital pharmcy to call back and confirm

## 2023-07-25 NOTE — PATIENT INSTRUCTIONS
Please continue taking your blood pressure and Cholesterol medications. Please get your Echo done. We will see you back in 6 months. Dr. Solomon Huffman.

## 2023-07-25 NOTE — PROGRESS NOTES
Li Ferrer (:  1970) is a 46 y.o. male,Established patient, here for evaluation of the following chief complaint(s): Allergies and Other (States unsteady at times)         ASSESSMENT/PLAN:  1. Essential hypertension  -     amLODIPine (NORVASC) 5 MG tablet; Take 1 tablet by mouth daily, Disp-90 tablet, R-1Normal  2. Mixed hyperlipidemia  -     atorvastatin (LIPITOR) 40 MG tablet; Take 1 tablet by mouth daily, Disp-90 tablet, R-1Normal  3. Systolic ejection murmur  -     Echocardiogram complete; Future  4. Need for shingles vaccine  -     zoster recombinant adjuvanted vaccine [facility]) 50 MCG/0.5ML SUSR injection; Inject 0.5 mLs into the muscle See Admin Instructions 1 dose now and repeat in 2-6 months, Disp-0.5 mL, R-0Print      Depression:  Follows up with [facility] counseling. We will give them a call once again to confirm patient's medication. HLD:   The 10-year ASCVD risk score (Wesly HENRY, et al., 2019) is: 4.5%    Values used to calculate the score:      Age: 46 years      Sex: Male      Is Non- : No      Diabetic: No      Tobacco smoker: No      Systolic Blood Pressure: 588 mmHg      Is BP treated: Yes      HDL Cholesterol: 49 mg/dL      Total Cholesterol: 183 mg/dL  LDL 98 chol 198  Continue atorvastatin 40 mg. HTN:  Blood pressure controlled in the office today 128/78. Has had high blood pressure in the past.    Continue amlodipine 5 mg. HCM:  Colonoscopy could not be done due to SPARKLE and unability to do ECHO. Reordered echo and gave it to the patient. I also gave her order of shingles vaccine to the patient. BCC:  Have a surgery in [facility]    SOB:  Was used to does not use his inhaler anymore. Return in about 5 months (around 2023), or if symptoms worsen or fail to improve, for PCP follow up, Blood pressure check.          Subjective   SUBJECTIVE/OBJECTIVE:  URBANO    Maya Villafana is a 46 yr.o male with past medical history of hypertension,

## 2023-07-25 NOTE — PROGRESS NOTES
[facility]  Internal Medicine Residency Clinic    Attending Physician Statement  I have discussed the case, including pertinent history and exam findings with the resident physician. I agree with the assessment, plan and orders as documented by the resident. I have reviewed the relevant PMHx, PSHx, FamHx, SocialHx, medications, and allergies and updated history as appropriate. Patient presents for routine follow up of medical problems. Meningioma  Continues on surveillance with annual MRI and recently established with neurosurgery    Hypertension   Controlled, ran out of meds 4 months   Continue amlodipine 5 mg     Systolic murmur  Will again order echo to evaluate, clinically asymptomatic    HLD   Continue in Lipitor 40 mg    Anxiety / depression   Follows on Depakote, trazodone per [facility]; obtain records to reconcile meds    Hx [facility]  Following with dermatology and planning for surgical excision      Remainder of medical problems as per resident note.     Esperanza Ramos DO  7/25/2023 1:42 PM

## 2023-08-07 ENCOUNTER — HOSPITAL ENCOUNTER (OUTPATIENT)
Dept: CARDIOLOGY | Age: 53
Discharge: HOME | End: 2023-08-07
Payer: MEDICAID

## 2023-08-07 DIAGNOSIS — R01.1 SYSTOLIC EJECTION MURMUR: ICD-10-CM

## 2023-08-07 LAB
LV EF: 65 %
LVEF MODALITY: NORMAL

## 2023-08-07 PROCEDURE — 93306 TTE W/DOPPLER COMPLETE: CPT

## 2023-08-18 ENCOUNTER — TELEPHONE (OUTPATIENT)
Dept: SURGERY | Age: 53
End: 2023-08-18

## 2023-08-18 ENCOUNTER — OFFICE VISIT (OUTPATIENT)
Dept: INTERNAL MEDICINE | Age: 53
End: 2023-08-18

## 2023-08-18 VITALS
TEMPERATURE: 97.9 F | OXYGEN SATURATION: 97 % | HEIGHT: 61 IN | SYSTOLIC BLOOD PRESSURE: 121 MMHG | HEART RATE: 86 BPM | DIASTOLIC BLOOD PRESSURE: 58 MMHG | RESPIRATION RATE: 16 BRPM | BODY MASS INDEX: 29.76 KG/M2 | WEIGHT: 157.6 LBS

## 2023-08-18 DIAGNOSIS — I10 ESSENTIAL HYPERTENSION: ICD-10-CM

## 2023-08-18 DIAGNOSIS — R01.1 SYSTOLIC EJECTION MURMUR: ICD-10-CM

## 2023-08-18 DIAGNOSIS — E78.2 MIXED HYPERLIPIDEMIA: ICD-10-CM

## 2023-08-18 DIAGNOSIS — F33.2 MAJOR DEPRESSIVE DISORDER, RECURRENT EPISODE, SEVERE WITH MIXED FEATURES (HCC): ICD-10-CM

## 2023-08-18 DIAGNOSIS — R26.89 IMBALANCE: ICD-10-CM

## 2023-08-18 DIAGNOSIS — D32.9 MENINGIOMA (HCC): ICD-10-CM

## 2023-08-18 DIAGNOSIS — C44.41 BASAL CELL CARCINOMA (BCC) OF SCALP: Primary | ICD-10-CM

## 2023-08-18 PROBLEM — K21.9 GASTROESOPHAGEAL REFLUX DISEASE WITHOUT ESOPHAGITIS: Status: RESOLVED | Noted: 2020-02-12 | Resolved: 2023-08-18

## 2023-08-18 NOTE — PATIENT INSTRUCTIONS
Dear Denilson Jose,    Thank you for coming to your appointment today. I hope we have addressed all of your needs. Please make sure to do the following:  - Continue your medications as listed. -General surgery will call you to reschedule your EGD and colonoscopy  - We will see each other again in 5 months    Call for a sooner appointment if you develop any issues    Have a great day!     Sincerely,  Mandy Ordoñez M.D  8/18/2023  9:23 AM

## 2023-08-18 NOTE — PROGRESS NOTES
[address] [place] [address]  Internal Medicine Residency Clinic    Attending Physician Statement  I have discussed the case, including pertinent history and exam findings with the resident physician. I agree with the assessment, plan and orders as documented by the resident. I have reviewed the relevant PMHx, PSHx, FamHx, SocialHx, medications, and allergies and updated history as appropriate. Presents for 4 week appointment    No acute complaints    4 week follow-up as plan was to resume meds that were stopped by patient for several months included amlodipine, atorvastatin   Patient did not resume any meds and does not want to resume at this time    BP remains controlled of amlodipine     Hypertension- currently stable off amlodipine     Hx of Meningioma and removal   Following with Neurosurgery    Balance concerns worsening   Seen recently by Neurosurgery and recommending Vestibular therapy which he has not started- will need to establish- strongly encouraged    MRI completed in June 2023 and repeat scheduled for 1 year by neurosurgery     Systolic Murmur- ? Related to aortic sclerosis    ECHO reviewed;   Summary   Technically limited study with off axis images. Left ventricular internal dimensions were normal in diastole and systole. No regional wall motion abnormalities seen. Normal left ventricular ejection fraction. The aortic valve appears mildly sclerotic. Mild tricuspid regurgitation. Severe GERD    Previous use of PPI and not taking regularly    Was seen previous by Gen Surgery and planning for EGD/CSCOPE   ECHO was completed for optimization due to systolic murmur    Ok to proceed with EGD/CSCOPE at this time    Previous admission for possible SBO noted under Gen Surgery- moving bowels, no abdominal pain currently     Remainder of medical problems as per resident note.     Theresa Bowers MD, [address] [place] [address]   8/18/2023 9:17 AM
affecting the balance. Has fallen 3 times in last 4 months.      I have reviewed my findings and recommendations with Cullen Venegas and Dr. Shirley Ware MD PGY-2  8/18/2023 4:47 PM

## 2023-08-18 NOTE — TELEPHONE ENCOUNTER
LPN received call from Dr Ranulfo Cruz in IM, regarding patients obtained ECHO. Per Dr Ranulfo Cruz [facility] SYSTEM was completed for optimization due to systolic murmur. Ok to proceed with EGD/CSCOPE at this time\"     LPN stated we will reach back out to patient to get rescheduled for scopes. Routing to Dr Neel Cobos for scheduling purposes.      Electronically signed by Celina Yan LPN on 8/11/02 at 1:90 AM EDT

## 2023-09-14 ENCOUNTER — EVALUATION (OUTPATIENT)
Dept: PHYSICAL THERAPY | Age: 53
End: 2023-09-14

## 2023-09-14 DIAGNOSIS — R26.89 IMBALANCE: Primary | ICD-10-CM

## 2023-09-14 NOTE — PROGRESS NOTES
[facility]          Phone: [phone] Fax: [phone]    Physical Therapy Daily Treatment Note  Date:  2023    Patient Name:  Angélica Parmar    :  1970  MRN: [id number]    Evaluating Therapist:  Krystin Campos    Restrictions/Precautions:    Diagnosis:     Diagnosis Orders   1.  Imbalance          Treatment Diagnosis:    Insurance/Certification information:  [address] [facility]  Referring Physician:  Nino Aaron MD  Plan of care signed (Y/N):    Visit# / total visits:    Pain level: 0/10   Time In:  1305  Time Out:  1335    Subjective:  See evaluation    Exercises:  Exercise/Equipment Resistance/Repetitions Other comments     bike       Standing balance on foam       Tandem gait       Sit to stand       Standing balance eyes closed                                                                                                            Other Therapeutic Activities:  PT evaluation completed    Home Exercise Program:  N/A    Manual Treatments:  N/A    Modalities:  N/A     Time-in Time-out Total Time   00054  Evaluation Low Complexity      10890  Evaluation Med Complexity      27750  Evaluation High Complexity      38172  Ther Ex 1305 1335 30   34597  Neuro Re-ed        64638  Ther Activities        90260  Manual Therapy       06430  E-stim       77903  Ultrasound            Session [id number]       Treatment/Activity Tolerance:  [x] Patient tolerated treatment well [] Patient limited by fatigue  [] Patient limited by pain  [] Patient limited by other medical complications  [] Other:     Prognosis: [x] Good [] Fair  [] Poor    Patient Requires Follow-up: [x] Yes  [] No    Plan:   [] Continue per plan of care [] Alter current plan (see comments)  [x] Plan of care initiated [] Hold pending MD visit [] Discharge  Plan for Next Session:        Electronically signed by:  Ulysses Blend, [place], [id number]
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
## 2023-10-24 ENCOUNTER — OFFICE VISIT (OUTPATIENT)
Dept: PRIMARY CARE CLINIC | Age: 53
End: 2023-10-24
Payer: MEDICAID

## 2023-10-24 ENCOUNTER — TREATMENT (OUTPATIENT)
Dept: PHYSICAL THERAPY | Age: 53
End: 2023-10-24
Payer: MEDICAID

## 2023-10-24 VITALS
HEART RATE: 93 BPM | DIASTOLIC BLOOD PRESSURE: 67 MMHG | SYSTOLIC BLOOD PRESSURE: 158 MMHG | OXYGEN SATURATION: 96 % | TEMPERATURE: 97.3 F

## 2023-10-24 DIAGNOSIS — S69.92XA INJURY OF LEFT WRIST, INITIAL ENCOUNTER: ICD-10-CM
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
## 2023-10-24 DIAGNOSIS — S69.92XA INJURY OF LEFT HAND, INITIAL ENCOUNTER: Primary | ICD-10-CM

## 2023-10-24 DIAGNOSIS — R26.89 IMBALANCE: Primary | ICD-10-CM

## 2023-10-24 PROCEDURE — 97112 NEUROMUSCULAR REEDUCATION: CPT | Performed by: PHYSICAL THERAPIST

## 2023-10-24 PROCEDURE — 99213 OFFICE O/P EST LOW 20 MIN: CPT | Performed by: NURSE PRACTITIONER

## 2023-10-24 PROCEDURE — 3017F COLORECTAL CA SCREEN DOC REV: CPT | Performed by: NURSE PRACTITIONER

## 2023-10-24 PROCEDURE — G8484 FLU IMMUNIZE NO ADMIN: HCPCS | Performed by: NURSE PRACTITIONER

## 2023-10-24 PROCEDURE — G8427 DOCREV CUR MEDS BY ELIG CLIN: HCPCS | Performed by: NURSE PRACTITIONER

## 2023-10-24 PROCEDURE — 97110 THERAPEUTIC EXERCISES: CPT | Performed by: PHYSICAL THERAPIST

## 2023-10-24 PROCEDURE — 3078F DIAST BP <80 MM HG: CPT | Performed by: NURSE PRACTITIONER

## 2023-10-24 PROCEDURE — 1036F TOBACCO NON-USER: CPT | Performed by: NURSE PRACTITIONER

## 2023-10-24 PROCEDURE — 3074F SYST BP LT 130 MM HG: CPT | Performed by: NURSE PRACTITIONER

## 2023-10-24 PROCEDURE — G8417 CALC BMI ABV UP PARAM F/U: HCPCS | Performed by: NURSE PRACTITIONER
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
## 2024-01-08 ENCOUNTER — OFFICE VISIT (OUTPATIENT)
Dept: PRIMARY CARE CLINIC | Age: 54
End: 2024-01-08
Payer: MEDICAID

## 2024-01-08 VITALS
BODY MASS INDEX: 29.45 KG/M2 | DIASTOLIC BLOOD PRESSURE: 80 MMHG | HEIGHT: 60 IN | WEIGHT: 150 LBS | TEMPERATURE: 97.6 F | HEART RATE: 72 BPM | RESPIRATION RATE: 18 BRPM | OXYGEN SATURATION: 98 % | SYSTOLIC BLOOD PRESSURE: 148 MMHG

## 2024-01-08 DIAGNOSIS — U07.1 COVID-19: Primary | ICD-10-CM

## 2024-01-08 DIAGNOSIS — R05.9 COUGH, UNSPECIFIED TYPE: ICD-10-CM

## 2024-01-08 LAB
Lab: ABNORMAL
PERFORMING INSTRUMENT: ABNORMAL
QC PASS/FAIL: ABNORMAL
SARS-COV-2, POC: DETECTED

## 2024-01-08 PROCEDURE — 3079F DIAST BP 80-89 MM HG: CPT | Performed by: NURSE PRACTITIONER

## 2024-01-08 PROCEDURE — G8417 CALC BMI ABV UP PARAM F/U: HCPCS | Performed by: NURSE PRACTITIONER

## 2024-01-08 PROCEDURE — G8427 DOCREV CUR MEDS BY ELIG CLIN: HCPCS | Performed by: NURSE PRACTITIONER

## 2024-01-08 PROCEDURE — 3077F SYST BP >= 140 MM HG: CPT | Performed by: NURSE PRACTITIONER

## 2024-01-08 PROCEDURE — 99213 OFFICE O/P EST LOW 20 MIN: CPT | Performed by: NURSE PRACTITIONER

## 2024-01-08 PROCEDURE — G8484 FLU IMMUNIZE NO ADMIN: HCPCS | Performed by: NURSE PRACTITIONER

## 2024-01-08 PROCEDURE — 87426 SARSCOV CORONAVIRUS AG IA: CPT | Performed by: NURSE PRACTITIONER

## 2024-01-08 PROCEDURE — 1036F TOBACCO NON-USER: CPT | Performed by: NURSE PRACTITIONER

## 2024-01-08 PROCEDURE — 3017F COLORECTAL CA SCREEN DOC REV: CPT | Performed by: NURSE PRACTITIONER

## 2024-03-01 ENCOUNTER — HOSPITAL ENCOUNTER (OUTPATIENT)
Age: 54
End: 2024-03-01
Payer: MEDICAID

## 2024-03-01 ENCOUNTER — OFFICE VISIT (OUTPATIENT)
Dept: PRIMARY CARE CLINIC | Age: 54
End: 2024-03-01
Payer: MEDICAID

## 2024-03-01 ENCOUNTER — HOSPITAL ENCOUNTER (OUTPATIENT)
Dept: GENERAL RADIOLOGY | Age: 54
End: 2024-03-01
Payer: MEDICAID

## 2024-03-01 VITALS
SYSTOLIC BLOOD PRESSURE: 133 MMHG | DIASTOLIC BLOOD PRESSURE: 86 MMHG | HEIGHT: 60 IN | OXYGEN SATURATION: 98 % | BODY MASS INDEX: 29.45 KG/M2 | HEART RATE: 96 BPM | WEIGHT: 150 LBS | TEMPERATURE: 97.7 F | RESPIRATION RATE: 16 BRPM

## 2024-03-01 DIAGNOSIS — M25.531 ACUTE PAIN OF RIGHT WRIST: Primary | ICD-10-CM
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
## 2024-03-01 DIAGNOSIS — M25.531 ACUTE PAIN OF RIGHT WRIST: ICD-10-CM

## 2024-03-01 PROCEDURE — 3017F COLORECTAL CA SCREEN DOC REV: CPT

## 2024-03-01 PROCEDURE — G8484 FLU IMMUNIZE NO ADMIN: HCPCS

## 2024-03-01 PROCEDURE — 73110 X-RAY EXAM OF WRIST: CPT

## 2024-03-01 PROCEDURE — 3079F DIAST BP 80-89 MM HG: CPT

## 2024-03-01 PROCEDURE — 1036F TOBACCO NON-USER: CPT

## 2024-03-01 PROCEDURE — G8417 CALC BMI ABV UP PARAM F/U: HCPCS

## 2024-03-01 PROCEDURE — 3075F SYST BP GE 130 - 139MM HG: CPT

## 2024-03-01 PROCEDURE — 99213 OFFICE O/P EST LOW 20 MIN: CPT

## 2024-03-01 PROCEDURE — G8427 DOCREV CUR MEDS BY ELIG CLIN: HCPCS

## 2024-03-01 RX ORDER — IBUPROFEN 600 MG/1
600 TABLET ORAL 3 TIMES DAILY PRN
Qty: 30 TABLET | Refills: 0 | Status: SHIPPED | OUTPATIENT
Start: 2024-03-01

## 2024-03-01 NOTE — PROGRESS NOTES
Chief Complaint:   Fall (Fell yesterday after physical therapy and fell in street (unsure if tripped or leg gave out). Now having pain in right wrist/swelling (hurt same wrist 4 months ago). Nothing done for symptoms. )      History of Present Illness   Source of history provided by:  patient.       Prabhakar Reynolds is a 53 y.o. old male who presents to the walk-in clinic, for a mechanical fall which occured 1 day(s) prior to arrival. He reportedly fell running while trying to catch the bus.  Since onset the symptoms have been remaining constant.  His pain is aggraveated by any movement or pressure on or palpation of painful area and relieved by nothing, as no treatment has been provided prior to this visit.  He denies any head injury, loss of consciousness, confusion, dizziness, numbness, weakness, or blurred vision. He takes no blood thinning agents.    ROS    Unless otherwise stated in this report or unable to obtain because of the patient's clinical or mental status as evidenced by the medical record, this patients's positive and negative responses for Review of Systems, constitutional, psych, eyes, ENT, cardiovascular, respiratory, gastrointestinal, neurological, genitourinary, musculoskeletal, integument systems and systems related to the presenting problem are either stated in the preceding or were not pertinent or were negative for the symptoms and/or complaints related to the medical problem.    Past Medical History:  has a past medical history of Basal cell carcinoma of skin, Cancer (HCC), Depression, Gastroesophageal reflux disease without esophagitis, Hypertension, and Severe episode of recurrent major depressive disorder, without psychotic features (HCC).   Past Surgical History:  has a past surgical history that includes Brain tumor excision (01/01/1972); Appendectomy (1985); Mandible surgery (1998); other surgical history (Right, 06/11/2014); Colonoscopy (2008); and Esophagogastroduodenoscopy

## 2024-03-12 DIAGNOSIS — S62.346A CLOSED NONDISPLACED FRACTURE OF BASE OF FIFTH METACARPAL BONE OF RIGHT HAND, INITIAL ENCOUNTER: Primary | ICD-10-CM

## 2024-03-14 ENCOUNTER — PATIENT MESSAGE (OUTPATIENT)
Dept: ORTHOPEDIC SURGERY | Age: 54
End: 2024-03-14

## 2024-03-26 ENCOUNTER — OFFICE VISIT (OUTPATIENT)
Dept: ORTHOPEDIC SURGERY | Age: 54
End: 2024-03-26
Payer: MEDICAID

## 2024-03-26 VITALS — TEMPERATURE: 98 F | HEIGHT: 60 IN | BODY MASS INDEX: 29.45 KG/M2 | WEIGHT: 150 LBS

## 2024-03-26 DIAGNOSIS — M79.641 RIGHT HAND PAIN: Primary | ICD-10-CM

## 2024-03-26 DIAGNOSIS — M79.641 RIGHT HAND PAIN: ICD-10-CM

## 2024-03-26 DIAGNOSIS — S62.316A CLOSED DISPLACED FRACTURE OF BASE OF FIFTH METACARPAL BONE OF RIGHT HAND, INITIAL ENCOUNTER: Primary | ICD-10-CM

## 2024-03-26 PROCEDURE — 99203 OFFICE O/P NEW LOW 30 MIN: CPT | Performed by: ORTHOPAEDIC SURGERY

## 2024-03-26 PROCEDURE — 26600 TREAT METACARPAL FRACTURE: CPT | Performed by: ORTHOPAEDIC SURGERY

## 2024-03-26 ASSESSMENT — ENCOUNTER SYMPTOMS
SHORTNESS OF BREATH: 0
ABDOMINAL DISTENTION: 0
EYE DISCHARGE: 0
ALLERGIC/IMMUNOLOGIC NEGATIVE: 1
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
## 2024-04-10 ENCOUNTER — HOSPITAL ENCOUNTER (INPATIENT)
Age: 54
LOS: 3 days | Discharge: HOME | End: 2024-04-13
Attending: STUDENT IN AN ORGANIZED HEALTH CARE EDUCATION/TRAINING PROGRAM | Admitting: PSYCHIATRY & NEUROLOGY
Payer: MEDICAID

## 2024-04-10 DIAGNOSIS — R45.851 SUICIDAL IDEATION: Primary | ICD-10-CM

## 2024-04-10 PROBLEM — F32.9 MAJOR DEPRESSION, SINGLE EPISODE: Status: ACTIVE | Noted: 2024-04-10

## 2024-04-10 LAB
AMPHET UR QL SCN: NEGATIVE
ANION GAP SERPL CALCULATED.3IONS-SCNC: 10 MMOL/L (ref 7–16)
APAP SERPL-MCNC: <5 UG/ML (ref 10–30)
BARBITURATES UR QL SCN: NEGATIVE
BASOPHILS # BLD: 0.07 K/UL (ref 0–0.2)
BASOPHILS NFR BLD: 1 % (ref 0–2)
BENZODIAZ UR QL: NEGATIVE
BILIRUB UR QL STRIP: NEGATIVE
BUN SERPL-MCNC: 15 MG/DL (ref 6–20)
BUPRENORPHINE UR QL: NEGATIVE
CALCIUM SERPL-MCNC: 9 MG/DL (ref 8.6–10.2)
CANNABINOIDS UR QL SCN: NEGATIVE
CHLORIDE SERPL-SCNC: 101 MMOL/L (ref 98–107)
CLARITY UR: CLEAR
CO2 SERPL-SCNC: 26 MMOL/L (ref 22–29)
COCAINE UR QL SCN: NEGATIVE
COLOR UR: YELLOW
COMMENT: NORMAL
CREAT SERPL-MCNC: 0.7 MG/DL (ref 0.7–1.2)
DATE LAST DOSE: ABNORMAL
EKG ATRIAL RATE: 84 BPM
EKG P AXIS: 75 DEGREES
EKG P-R INTERVAL: 158 MS
EKG Q-T INTERVAL: 372 MS
EKG QRS DURATION: 80 MS
EKG QTC CALCULATION (BAZETT): 439 MS
EKG R AXIS: 59 DEGREES
EKG T AXIS: 48 DEGREES
EKG VENTRICULAR RATE: 84 BPM
EOSINOPHIL # BLD: 0.56 K/UL (ref 0.05–0.5)
EOSINOPHILS RELATIVE PERCENT: 8 % (ref 0–6)
ERYTHROCYTE [DISTWIDTH] IN BLOOD BY AUTOMATED COUNT: 13.4 % (ref 11.5–15)
ETHANOLAMINE SERPL-MCNC: <10 MG/DL
FENTANYL UR QL: NEGATIVE
GFR SERPL CREATININE-BSD FRML MDRD: >90 ML/MIN/1.73M2
GLUCOSE SERPL-MCNC: 92 MG/DL (ref 74–99)
GLUCOSE UR STRIP-MCNC: NEGATIVE MG/DL
HCT VFR BLD AUTO: 41.8 % (ref 37–54)
HGB BLD-MCNC: 14.4 G/DL (ref 12.5–16.5)
HGB UR QL STRIP.AUTO: NEGATIVE
IMM GRANULOCYTES # BLD AUTO: 0.03 K/UL (ref 0–0.58)
IMM GRANULOCYTES NFR BLD: 0 % (ref 0–5)
KETONES UR STRIP-MCNC: NEGATIVE MG/DL
LEUKOCYTE ESTERASE UR QL STRIP: NEGATIVE
LYMPHOCYTES NFR BLD: 2.46 K/UL (ref 1.5–4)
LYMPHOCYTES RELATIVE PERCENT: 33 % (ref 20–42)
MCH RBC QN AUTO: 28.8 PG (ref 26–35)
MCHC RBC AUTO-ENTMCNC: 34.4 G/DL (ref 32–34.5)
MCV RBC AUTO: 83.6 FL (ref 80–99.9)
METHADONE UR QL: NEGATIVE
MONOCYTES NFR BLD: 0.65 K/UL (ref 0.1–0.95)
MONOCYTES NFR BLD: 9 % (ref 2–12)
NEUTROPHILS NFR BLD: 49 % (ref 43–80)
NEUTS SEG NFR BLD: 3.65 K/UL (ref 1.8–7.3)
NITRITE UR QL STRIP: NEGATIVE
OPIATES UR QL SCN: NEGATIVE
OXYCODONE UR QL SCN: NEGATIVE
PCP UR QL SCN: NEGATIVE
PH UR STRIP: 6 [PH] (ref 5–9)
PLATELET # BLD AUTO: 252 K/UL (ref 130–450)
PMV BLD AUTO: 9.6 FL (ref 7–12)
POTASSIUM SERPL-SCNC: 4 MMOL/L (ref 3.5–5)
PROT UR STRIP-MCNC: NEGATIVE MG/DL
RBC # BLD AUTO: 5 M/UL (ref 3.8–5.8)
SALICYLATES SERPL-MCNC: <0.3 MG/DL (ref 0–30)
SODIUM SERPL-SCNC: 137 MMOL/L (ref 132–146)
SP GR UR STRIP: 1.01 (ref 1–1.03)
TEST INFORMATION: NORMAL
TME LAST DOSE: ABNORMAL H
TOXIC TRICYCLIC SC,BLOOD: NEGATIVE
UROBILINOGEN UR STRIP-ACNC: 0.2 EU/DL (ref 0–1)
VALPROATE SERPL-MCNC: 34 UG/ML (ref 50–100)
VANCOMYCIN DOSE: ABNORMAL MG
WBC OTHER # BLD: 7.4 K/UL (ref 4.5–11.5)

## 2024-04-10 PROCEDURE — 90792 PSYCH DIAG EVAL W/MED SRVCS: CPT | Performed by: NURSE PRACTITIONER

## 2024-04-10 PROCEDURE — 80179 DRUG ASSAY SALICYLATE: CPT

## 2024-04-10 PROCEDURE — 93005 ELECTROCARDIOGRAM TRACING: CPT | Performed by: STUDENT IN AN ORGANIZED HEALTH CARE EDUCATION/TRAINING PROGRAM

## 2024-04-10 PROCEDURE — 6370000000 HC RX 637 (ALT 250 FOR IP): Performed by: NURSE PRACTITIONER

## 2024-04-10 PROCEDURE — 80164 ASSAY DIPROPYLACETIC ACD TOT: CPT

## 2024-04-10 PROCEDURE — 80143 DRUG ASSAY ACETAMINOPHEN: CPT

## 2024-04-10 PROCEDURE — 99285 EMERGENCY DEPT VISIT HI MDM: CPT

## 2024-04-10 PROCEDURE — G0480 DRUG TEST DEF 1-7 CLASSES: HCPCS

## 2024-04-10 PROCEDURE — 6370000000 HC RX 637 (ALT 250 FOR IP): Performed by: PSYCHIATRY & NEUROLOGY

## 2024-04-10 PROCEDURE — 80048 BASIC METABOLIC PNL TOTAL CA: CPT

## 2024-04-10 PROCEDURE — 81003 URINALYSIS AUTO W/O SCOPE: CPT

## 2024-04-10 PROCEDURE — 85025 COMPLETE CBC W/AUTO DIFF WBC: CPT

## 2024-04-10 PROCEDURE — 1240000000 HC EMOTIONAL WELLNESS R&B

## 2024-04-10 PROCEDURE — 93010 ELECTROCARDIOGRAM REPORT: CPT | Performed by: INTERNAL MEDICINE

## 2024-04-10 PROCEDURE — 80307 DRUG TEST PRSMV CHEM ANLYZR: CPT

## 2024-04-10 RX ORDER — DIVALPROEX SODIUM 500 MG/1
500 TABLET, DELAYED RELEASE ORAL NIGHTLY
Status: DISCONTINUED | OUTPATIENT
Start: 2024-04-10 | End: 2024-04-13 | Stop reason: HOSPADM

## 2024-04-10 RX ORDER — NICOTINE 21 MG/24HR
1 PATCH, TRANSDERMAL 24 HOURS TRANSDERMAL DAILY
Status: DISCONTINUED | OUTPATIENT
Start: 2024-04-10 | End: 2024-04-13 | Stop reason: HOSPADM

## 2024-04-10 RX ORDER — ACETAMINOPHEN 325 MG/1
650 TABLET ORAL EVERY 6 HOURS PRN
Status: DISCONTINUED | OUTPATIENT
Start: 2024-04-10 | End: 2024-04-13 | Stop reason: HOSPADM

## 2024-04-10 RX ORDER — HALOPERIDOL 5 MG/ML
5 INJECTION INTRAMUSCULAR EVERY 6 HOURS PRN
Status: DISCONTINUED | OUTPATIENT
Start: 2024-04-10 | End: 2024-04-13 | Stop reason: HOSPADM

## 2024-04-10 RX ORDER — HALOPERIDOL 5 MG/1
5 TABLET ORAL EVERY 6 HOURS PRN
Status: DISCONTINUED | OUTPATIENT
Start: 2024-04-10 | End: 2024-04-13 | Stop reason: HOSPADM

## 2024-04-10 RX ORDER — DIVALPROEX SODIUM 500 MG/1
500 TABLET, DELAYED RELEASE ORAL NIGHTLY
Status: ON HOLD | COMMUNITY
End: 2024-04-12 | Stop reason: HOSPADM

## 2024-04-10 RX ORDER — MAGNESIUM HYDROXIDE/ALUMINUM HYDROXICE/SIMETHICONE 120; 1200; 1200 MG/30ML; MG/30ML; MG/30ML
30 SUSPENSION ORAL PRN
Status: DISCONTINUED | OUTPATIENT
Start: 2024-04-10 | End: 2024-04-13 | Stop reason: HOSPADM

## 2024-04-10 RX ORDER — DIVALPROEX SODIUM 250 MG/1
250 TABLET, DELAYED RELEASE ORAL DAILY
Status: DISCONTINUED | OUTPATIENT
Start: 2024-04-10 | End: 2024-04-13 | Stop reason: HOSPADM

## 2024-04-10 RX ORDER — HYDROXYZINE PAMOATE 25 MG/1
50 CAPSULE ORAL 3 TIMES DAILY PRN
Status: DISCONTINUED | OUTPATIENT
Start: 2024-04-10 | End: 2024-04-13 | Stop reason: HOSPADM

## 2024-04-10 RX ORDER — DIVALPROEX SODIUM 250 MG/1
250 TABLET, DELAYED RELEASE ORAL DAILY
Status: ON HOLD | COMMUNITY
End: 2024-04-12 | Stop reason: HOSPADM

## 2024-04-10 RX ORDER — LANOLIN ALCOHOL/MO/W.PET/CERES
3 CREAM (GRAM) TOPICAL NIGHTLY PRN
Status: DISCONTINUED | OUTPATIENT
Start: 2024-04-10 | End: 2024-04-13 | Stop reason: HOSPADM

## 2024-04-10 RX ADMIN — DIVALPROEX SODIUM 500 MG: 500 TABLET, DELAYED RELEASE ORAL at 21:20

## 2024-04-10 RX ADMIN — SERTRALINE HYDROCHLORIDE 50 MG: 50 TABLET ORAL at 12:17

## 2024-04-10 RX ADMIN — DIVALPROEX SODIUM 250 MG: 250 TABLET, DELAYED RELEASE ORAL at 12:17

## 2024-04-10 RX ADMIN — Medication 3 MG: at 21:20

## 2024-04-10 ASSESSMENT — PATIENT HEALTH QUESTIONNAIRE - PHQ9
1. LITTLE INTEREST OR PLEASURE IN DOING THINGS: NEARLY EVERY DAY
SUM OF ALL RESPONSES TO PHQ9 QUESTIONS 1 & 2: 6
SUM OF ALL RESPONSES TO PHQ QUESTIONS 1-9: 16
5. POOR APPETITE OR OVEREATING: SEVERAL DAYS
9. THOUGHTS THAT YOU WOULD BE BETTER OFF DEAD, OR OF HURTING YOURSELF: MORE THAN HALF THE DAYS
10. IF YOU CHECKED OFF ANY PROBLEMS, HOW DIFFICULT HAVE THESE PROBLEMS MADE IT FOR YOU TO DO YOUR WORK, TAKE CARE OF THINGS AT HOME, OR GET ALONG WITH OTHER PEOPLE: VERY DIFFICULT
SUM OF ALL RESPONSES TO PHQ QUESTIONS 1-9: 16
4. FEELING TIRED OR HAVING LITTLE ENERGY: SEVERAL DAYS
2. FEELING DOWN, DEPRESSED OR HOPELESS: NEARLY EVERY DAY
SUM OF ALL RESPONSES TO PHQ QUESTIONS 1-9: 16
3. TROUBLE FALLING OR STAYING ASLEEP: MORE THAN HALF THE DAYS
SUM OF ALL RESPONSES TO PHQ QUESTIONS 1-9: 14
6. FEELING BAD ABOUT YOURSELF - OR THAT YOU ARE A FAILURE OR HAVE LET YOURSELF OR YOUR FAMILY DOWN: SEVERAL DAYS
7. TROUBLE CONCENTRATING ON THINGS, SUCH AS READING THE NEWSPAPER OR WATCHING TELEVISION: SEVERAL DAYS
8. MOVING OR SPEAKING SO SLOWLY THAT OTHER PEOPLE COULD HAVE NOTICED. OR THE OPPOSITE, BEING SO FIGETY OR RESTLESS THAT YOU HAVE BEEN MOVING AROUND A LOT MORE THAN USUAL: MORE THAN HALF THE DAYS

## 2024-04-10 ASSESSMENT — SLEEP AND FATIGUE QUESTIONNAIRES
SLEEP PATTERN: DIFFICULTY FALLING ASLEEP;INSOMNIA
AVERAGE NUMBER OF SLEEP HOURS: 6
DO YOU HAVE DIFFICULTY SLEEPING: YES
DO YOU USE A SLEEP AID: YES

## 2024-04-10 ASSESSMENT — LIFESTYLE VARIABLES
HOW MANY STANDARD DRINKS CONTAINING ALCOHOL DO YOU HAVE ON A TYPICAL DAY: PATIENT DOES NOT DRINK
HOW OFTEN DO YOU HAVE A DRINK CONTAINING ALCOHOL: NEVER
HOW MANY STANDARD DRINKS CONTAINING ALCOHOL DO YOU HAVE ON A TYPICAL DAY: PATIENT DOES NOT DRINK
HOW OFTEN DO YOU HAVE A DRINK CONTAINING ALCOHOL: NEVER

## 2024-04-10 NOTE — PROGRESS NOTES
[facility]  Admission Note     Pt denies SI, HI and AVH. Pt stated he has been feeling suicidal for about a month but only last night started to formulate a plan and then came into the ER. Pt stated he lives alone but takes trips on the bus to help him \"kick the thoughts.\" Pt presents sad and depressed. Speaks looking down. When asked questions he had to elaborate on, pt did lift his head to speak to this nurse. Pt stated he is medication compliant but does not believe AM medications are helping him as much as PM medications. Pt hopeful things will get better.  Pt has brace on right wrist. Pt stated he fell multiple times in the last few months. The fx occurred when he left rehab and went to catch a bus across the street and fell in the street. Brace to stay on arm for 2 more weeks. Oriented to room and unit. Will continue to monitor.     Admission Type:   Admission Type: Involuntary    Reason for admission:  Reason for Admission: \"I've been suicidal. Mostly related to self esteem issues.\"      Addictive Behavior:   Addictive Behavior  In the Past 3 Months, Have You Felt or Has Someone Told You That You Have a Problem With  : None    Medical Problems:   Past Medical History:   Diagnosis Date    Basal cell carcinoma of skin 11/8/2017    Cancer ([facility]) 1/2011    skin- head    Depression     Gastroesophageal reflux disease without esophagitis 2/12/2020    Hypertension     Severe episode of recurrent major depressive disorder, without psychotic features ([facility]) 8/18/2017       Status EXAM:  Mental Status and Behavioral Exam  Normal: No  Level of Assistance: Independent/Self  Facial Expression: Flat, Sad  Affect: Congruent  Level of Consciousness: Alert  Frequency of Checks: 4 times per hour, close  Mood:Normal: No  Mood: Depressed, Anxious, Helpless, Sad, Despair  Motor Activity:Normal: No  Motor Activity: Decreased  Eye Contact: Fair  Observed Behavior: Friendly, Cooperative  Sexual Misconduct History:

## 2024-04-10 NOTE — ED NOTES
Per section g ORQUIDEA Sarabia, Dr. Barraza accepted for [address]. SW contacted admitting ([facility]), patient assigned to bed [address]B on [address].

## 2024-04-10 NOTE — ED PROVIDER NOTES
Department of Emergency Medicine   ED  Provider Note  Admit Date/RoomTime: 4/10/2024  1:14 AM  ED Room: 30/30          History of Present Illness:    4/10/24, Time: 1:02 AM EDT         Prabhakar Reynolds is a 53 y.o. male presenting to the ED for complaint of suicidal with a plan to overdose on his home nighttime medications.  Does elicit previous suicide attempt in the past.  Is been suicidal for about 2 weeks.  Denies any drug or alcohol abuse denies any other acute complaints this time    Review of Systems:   Pertinent positives and negatives are stated within HPI, all other systems reviewed and are negative.        --------------------------------------------- PAST HISTORY ---------------------------------------------  Past Medical History:  has a past medical history of Basal cell carcinoma of skin, Cancer (HCC), Depression, Gastroesophageal reflux disease without esophagitis, Hypertension, and Severe episode of recurrent major depressive disorder, without psychotic features (HCC).    Past Surgical History:  has a past surgical history that includes Brain tumor excision (01/01/1972); Appendectomy (1985); Mandible surgery (1998); other surgical history (Right, 06/11/2014); Colonoscopy (2008); and Esophagogastroduodenoscopy (2010).    Social History:  reports that he quit smoking about 16 years ago. His smoking use included cigarettes. He started smoking about 40 years ago. He has a 12.0 pack-year smoking history. He has been exposed to tobacco smoke. He has never used smokeless tobacco. He reports that he does not drink alcohol and does not use drugs.    Family History: family history includes Alcohol Abuse in his father; Cancer in his father; Stroke in his mother.     The patient’s home medications have been reviewed.    Allergies: Patient has no known allergies.        ---------------------------------------------------PHYSICAL EXAM--------------------------------------    Constitutional/General: Alert and  about these medications      ibuprofen 600 MG tablet  Commonly known as: ADVIL;MOTRIN  Take 1 tablet by mouth 3 times daily as needed for Pain     melatonin 3 MG Tabs tablet  Take 1 tablet by mouth nightly     sertraline 50 MG tablet  Commonly known as: ZOLOFT  Take 1 tablet by mouth daily     traZODone 100 MG tablet  Commonly known as: DESYREL                  Re-Evaluations:             ED Course as of 04/10/24 0223   Wed Apr 10, 2024   0145 EKG as interpreted by myself  Rate of 84  Sinus rhythm normal axis stable intervals no acute ST elevation or depression QTc 439 [CB]      ED Course User Index  [CB] Terence Peerz MD         This patient's ED course included: a personal history and physicial examination, re-evaluation prior to disposition, multiple bedside re-evaluations, IV medications, cardiac monitoring, and continuous pulse oximetry    This patient has remained hemodynamically stable during their ED course.          Counseling:   The emergency provider has spoken with the patient and discussed today’s results, in addition to providing specific details for the plan of care and counseling regarding the diagnosis and prognosis.  Questions are answered at this time and they are agreeable with the plan.       --------------------------------- IMPRESSION AND DISPOSITION ---------------------------------    IMPRESSION  1. Suicidal ideation        DISPOSITION  Disposition: Per  recommendations  Patient condition is stable        NOTE: This report was transcribed using voice recognition software. Every effort was made to ensure accuracy; however, inadvertent computerized transcription errors may be present       Terence Perez MD  04/10/24 0223

## 2024-04-10 NOTE — PLAN OF CARE
[facility]  Initial Interdisciplinary Treatment Plan Note      Original treatment plan Date & Time: 4/10 9am    Admission Type:  Admission Type: Involuntary    Reason for admission:   Reason for Admission: \"I've been suicidal. Mostly related to self esteem issues.\"    Estimated Length of Stay:  5-7days  Estimated Discharge Date: To be determined by physician.    PATIENT STRENGTHS:  Patient Strengths:   Patient Strengths and Limitations:   Addictive Behavior: Addictive Behavior  In the Past 3 Months, Have You Felt or Has Someone Told You That You Have a Problem With  : None  Medical Problems:  Past Medical History:   Diagnosis Date    Basal cell carcinoma of skin 11/8/2017    Cancer ([facility]) 1/2011    skin- head    Depression     Gastroesophageal reflux disease without esophagitis 2/12/2020    Hypertension     Severe episode of recurrent major depressive disorder, without psychotic features ([facility]) 8/18/2017     Status EXAM:Mental Status and Behavioral Exam  Normal: No  Level of Assistance: Independent/Self  Facial Expression: Flat, Sad  Affect: Congruent  Level of Consciousness: Alert  Frequency of Checks: 4 times per hour, close  Mood:Normal: No  Mood: Depressed, Anxious, Helpless, Sad, Despair  Motor Activity:Normal: No  Motor Activity: Decreased  Eye Contact: Fair  Observed Behavior: Friendly, Cooperative  Sexual Misconduct History: Current - no  Preception: Howard to person, Howard to time, Howard to place, Howard to situation  Attention:Normal: No  Attention: Distractible  Thought Processes: Circumstantial  Thought Content:Normal: No  Thought Content: Preoccupations  Depression Symptoms: Impaired concentration, Feelings of hopelessess, Feelings of helplessness, Feelings of worthlessness, Loss of interest, Isolative  Anxiety Symptoms: Generalized  Shea Symptoms: Poor judgment  Hallucinations: None  Delusions: No  Memory:Normal: Yes  Insight and Judgment: No  Insight and Judgment: Poor judgment, Poor

## 2024-04-10 NOTE — ED NOTES
Nurse to nurse report given to Desiree HEARD on [address] which includes patient presentation complaint, pink slip, medications, lab results EKG Qtc, and past medical/ psych history and admitting orders.

## 2024-04-10 NOTE — CARE COORDINATION
Biopsychosocial Assessment Note    Social work met with patient to complete the biopsychosocial assessment and C-SSRS.     Chief Complaint: \"I was having suicidal thoughts.\"    Mental Status Exam: Pt was alert and oriented x4. Pt's thoughts process was circumstantial, mood was sad with a congruent affect. Pt's speech was slow and low in volume. Pt's eye contact was poor. Pt had poor insight/judgment. Pt denied current SI, HI, or AVH    Clinical Summary: Pt reported he has been having increased issues with his self-esteem and being secluded. Pt reported he has always struggled with self-esteem and depression ever since he was 15 years old. Pt reported, \"I just feel like I've been by myself for too long and it is getting to me. I called the ambulance before I acted on anything.\" Pt reported he has been having this thoughts for the past several weeks but reported \"they have been coming to a head recently.\"    Pt reported previous inpatient psychiatric admissions. Pt stated, \"It feels like I come to the hospital every year to year and a half.\" Pt's last admission to [facility] was on 2/28/2023. Pt reported he I currently active with [facility] and he has been compliant with his medications. Pt reported he sees a counselor, psychiatrist, and has a  through [facility]. Pt reported he has been struggling with depression and suicidal thoughts ever since he was 15 years old. Pt reported recently his suicidal thoughts have increased and reported they have been 4-5 times a week and will last 3-4 hours. Pt reported the thoughts are hard to control when they are happening. Pt reported the thought of his 6 year old son stops him from acting on these intentions. Pt reported 2 previous suicide attempts. Pt reported his first attempt was when he was 15 years old and pt tried to overdose on his medication. Pt reported he was taken to the hospital and admitted during that time. Pt reported his second

## 2024-04-10 NOTE — H&P
Department of Psychiatry  History and Physical - Adult     CHIEF COMPLAINT:  \"I am depressed\"    Patient was seen after discussing with the treatment team and reviewing the chart    CIRCUMSTANCES OF ADMISSION:   Prabhakar Reynolds is a 53-year-old male with history of major depression and past inpatient psychiatric hospitalizations presenting to [facility] emergency department via EMS reporting suicidal ideation continuing to worsen over the last 3-4 weeks.  Placed on involuntary hold by ER doctor for suicidal ideation with plan to overdose.  Precipitating factors may include loneliness, and isolation.    HISTORY OF PRESENT ILLNESS:      The patient is a 53 y.o. male with significant past history of major depressive disorder, with past inpatient psychiatric hospitalization here in February 2023, presenting to [facility] emergency department via EMS reporting suicidal ideation continuing to worsen over the last 3-4 weeks.  Placed on involuntary hold for suicidal ideation with plan to overdose.  UDS in ED is negative.  Medically cleared, admitted to [facility] for psychiatric evaluation and stabilization.      Prabhakar is depressed and lonely. He appears blunted, sad and depressed. He is despondent, eye contact is poor. He has some underlying anxiety he is talking about some conflict with his ex that he has a 6 year old with. He states that she has her own problems. He tells me that after he was discharged the last time [facility] he [facility] able to get an apartment. He denies any AVH, states that sometimes he feels like people are talking about him at the apartment complex. He states that he thinks this is really happening and he does not care what they have to say.  No overt or covert signs of psychosis or paranoia,. He states that he is sleeping okay with the medication he takes at night. Denies any manic symptoms. Denies current SI/HI intent or plan. Agreeable to treatment Alert  04/10/2024     Lab Results   Component Value Date/Time    VALPROATE 34 04/10/2024 01:30 AM         Radiology XR HAND RIGHT (MIN 3 VIEWS)    Result Date: 3/31/2024  EXAMINATION: THREE XRAY VIEWS OF THE RIGHT HAND 3/26/2024 11:05 am COMPARISON: Wrist dated 03/12/2000 24 HISTORY: ORDERING SYSTEM PROVIDED HISTORY: Right hand pain FINDINGS: Three views of the right hand reveal healing fracture involving the base of the 5th metacarpal.  Minimal callus formation identified.  No change seen in the alignment with slight volar angulation of the fracture fragments with minimal displacement.  Minimal degenerative changes seen of the radiocarpal joints.  Cortex is intact.  Joint spaces are preserved.  No other acute bony abnormality.     Healing fracture of the base of the 5th metacarpal with stable alignment.         TREATMENT PLAN:  The patient's diagnosis, treatment plan, medication management were formulated after patient was seen directly by the attending physician and myself and all relevant documentation was reviewed.    Risk, benefit, side effects, possible outcomes of the medication and alternatives discussed with the patient and the patient demonstrated understanding.  The patient was also educated that the outcome of treatment will depend on the medication compliance as directed by the prescribers along with regular follow-up, compliance with the labs and other work-up, as clinically indicated.    Risk Management: Based on the diagnosis and assessment biopsychosocial treatment model was presented to the patient and was given the opportunity to ask any question.  The patient was agreeable to the plan and all the patient's questions were answered to the patient's satisfaction.  I discussed with the patient the risk, benefit, alternative and common side effects for the proposed medication treatment.  The patient is consenting to this treatment.     The patients risk factors have been mitigated as they have been admitted

## 2024-04-10 NOTE — PROGRESS NOTES
OQ complete  Medications verified with [facility].   Appointment at [facility] cancelled per pt request

## 2024-04-10 NOTE — ED NOTES
Behavioral Health Crisis Assessment      Chief Complaint: Patient report that he is having suicidal thoughts due to depression and low self-esteem.    Mental Status Exam: Patient is alert and oriented x 3.  Patient made limited eye contact.  Patient with flat affect. Patient remained calm and cooperative, no sign of agitation. Patient denied experiencing any hallucinations.  Patient endorsed suicidal thoughts daily for the last 3 to 4 weeks.  Patient denied HI.     Legal Status:  [] Voluntary:  [x] Involuntary, Issued by: ED doctor    Gender:  [x] Male [] Female [] Transgender  [] Other    Sexual Orientation:  [x] Heterosexual [] Homosexual [] Bisexual [] Other    Brief Clinical Summary:    Patient is a 53-year-old male who presented to the ED via ambulance.  Patient is on an involuntary hold: Patient suicidal with plan to overdose on his home medications.  Suicidal for 2 weeks.  SW met with patient for assessment.  Patient reported having suicidal thoughts due to self-esteem and depression.  Patient added that he lives alone and is feeling isolated.  Patient said the thoughts have been daily for the last 3 to 4 weeks.  Patient stated he is having thoughts about overdosing.  Patient reported having a history of an overdose \"in the late 80s.\"  Patient denied having history of self-injurious behavior.  Patient denied having HI, denied having any history of aggression.  Per chart patient is diagnosed with major depressive disorder, recurrent episode, severe with mixed features.  Per chart patient has several admissions including [facility], last being on 3/1/2023.  Patient reported he follows outpatient treatment at [facility], patient reported med compliance.    Patient denied having any history of AOD, denied having any history of AOD treatment.  Patient denied having a legal guardian or POA.    Collateral Information: None    Risk Factors:  Mental health diagnosis: Major depressive disorder, recurrent episode,

## 2024-04-10 NOTE — GROUP NOTE
Group Therapy Note    Date: 4/10/2024    Group Start Time: 1425  Group End Time: 1457  Group Topic: Cognitive Skills    [facility] ACUTE BH 1    Wicho Gutierrez MSW        Group Therapy Note    Attendees: 5       Patient's Goal: To actively participate in group discussion on Self Care and Taking Time for Yourself.    Notes: Pt was an active participant in group discussion.    Status After Intervention:  Improved    Participation Level: Active Listener and Interactive    Participation Quality: Appropriate, Attentive, Sharing, and Supportive      Speech:  normal      Thought Process/Content: Linear      Affective Functioning: Congruent      Mood: depressed      Level of consciousness:  Alert, Oriented x4, and Attentive      Response to Learning: Able to verbalize current knowledge/experience, Able to verbalize/acknowledge new learning, and Able to retain information      Endings: None Reported    Modes of Intervention: Education, Support, Socialization, Exploration, Clarifying, and Problem-solving      Discipline Responsible: /Counselor      Signature:  AYESHA Lira

## 2024-04-10 NOTE — ED NOTES
Assumed care of pt at this time; provided linens to change and cup for urine specimen; will monitor

## 2024-04-10 NOTE — PROGRESS NOTES
4 Eyes Skin Assessment     NAME:  Prabhakar Reynolds  YOB: 1970  MEDICAL RECORD NUMBER:  [id number]    The patient is being assessed for  Admission    I agree that at least one RN has performed a thorough Head to Toe Skin Assessment on the patient. ALL assessment sites listed below have been assessed.      Areas assessed by both nurses:    Head, Face, Ears, Shoulders, Back, Chest, Arms, Elbows, Hands, Sacrum. Buttock, Coccyx, Ischium, and Legs. Feet and Heels        Does the Patient have a Wound? No noted wound(s)       Anupam Prevention initiated by RN: No  Wound Care Orders initiated by RN: No    Pressure Injury (Stage 3,4, Unstageable, DTI, NWPT, and Complex wounds) if present, place Wound referral order by RN under : No    New Ostomies, if present place, Ostomy referral order under : No     Nurse 1 eSignature: Electronically signed by Imelda Duggan RN on 4/10/24 at 11:52 AM EDT    **SHARE this note so that the co-signing nurse can place an eSignature**    Nurse 2 eSignature: Electronically signed by Tania Coats RN on 4/10/24 at 3:43 PM EDT

## 2024-04-11 LAB
CHOLEST SERPL-MCNC: 221 MG/DL
HDLC SERPL-MCNC: 46 MG/DL
LDLC SERPL CALC-MCNC: 154 MG/DL
TRIGL SERPL-MCNC: 106 MG/DL
VLDLC SERPL CALC-MCNC: 21 MG/DL

## 2024-04-11 PROCEDURE — 80061 LIPID PANEL: CPT

## 2024-04-11 PROCEDURE — 1240000000 HC EMOTIONAL WELLNESS R&B

## 2024-04-11 PROCEDURE — 99232 SBSQ HOSP IP/OBS MODERATE 35: CPT | Performed by: NURSE PRACTITIONER

## 2024-04-11 PROCEDURE — 36415 COLL VENOUS BLD VENIPUNCTURE: CPT

## 2024-04-11 PROCEDURE — 6370000000 HC RX 637 (ALT 250 FOR IP): Performed by: PSYCHIATRY & NEUROLOGY

## 2024-04-11 PROCEDURE — 6370000000 HC RX 637 (ALT 250 FOR IP): Performed by: NURSE PRACTITIONER

## 2024-04-11 RX ADMIN — DIVALPROEX SODIUM 250 MG: 250 TABLET, DELAYED RELEASE ORAL at 09:48

## 2024-04-11 RX ADMIN — ACETAMINOPHEN 650 MG: 325 TABLET ORAL at 05:56

## 2024-04-11 RX ADMIN — HYDROXYZINE PAMOATE 50 MG: 25 CAPSULE ORAL at 22:20

## 2024-04-11 RX ADMIN — DIVALPROEX SODIUM 500 MG: 500 TABLET, DELAYED RELEASE ORAL at 22:19

## 2024-04-11 RX ADMIN — Medication 3 MG: at 22:20

## 2024-04-11 RX ADMIN — SERTRALINE HYDROCHLORIDE 50 MG: 50 TABLET ORAL at 09:48

## 2024-04-11 ASSESSMENT — PAIN SCALES - GENERAL
PAINLEVEL_OUTOF10: 8
PAINLEVEL_OUTOF10: 8
PAINLEVEL_OUTOF10: 3

## 2024-04-11 ASSESSMENT — PAIN DESCRIPTION - ORIENTATION
ORIENTATION: LOWER

## 2024-04-11 ASSESSMENT — PAIN DESCRIPTION - LOCATION
LOCATION: BACK

## 2024-04-11 ASSESSMENT — PAIN DESCRIPTION - ONSET: ONSET: GRADUAL

## 2024-04-11 ASSESSMENT — PAIN - FUNCTIONAL ASSESSMENT
PAIN_FUNCTIONAL_ASSESSMENT: ACTIVITIES ARE NOT PREVENTED

## 2024-04-11 ASSESSMENT — PAIN DESCRIPTION - PAIN TYPE
TYPE: ACUTE PAIN
TYPE: ACUTE PAIN

## 2024-04-11 ASSESSMENT — PAIN DESCRIPTION - FREQUENCY: FREQUENCY: INTERMITTENT

## 2024-04-11 ASSESSMENT — PAIN DESCRIPTION - DESCRIPTORS
DESCRIPTORS: ACHING
DESCRIPTORS: ACHING;DISCOMFORT
DESCRIPTORS: ACHING;DISCOMFORT

## 2024-04-11 NOTE — CARE COORDINATION
[facility] contacted [facility] [phone] and scheduled a medication management appointment on 4/16 and a counseling appointment on 4/18.

## 2024-04-11 NOTE — GROUP NOTE
Group Therapy Note    Date: 4/11/2024    Group Start Time: 1405  Group End Time: 1440  Group Topic: Cognitive Skills    [facility] 7SE ACUTE BH 1    Wicho Gutierrez MSW        Group Therapy Note    Attendees: 6       Patient's Goal: To participate in group discussion on \"Getting out of thinking traps\" and Cognitive Distortions.     Notes: Pt was an active participant in group discussion.    Status After Intervention:  Improved    Participation Level: Active Listener    Participation Quality: Appropriate, Attentive, Sharing, and Supportive      Speech:  normal      Thought Process/Content: Linear      Affective Functioning: Congruent      Mood: depressed      Level of consciousness:  Alert, Oriented x4, and Attentive      Response to Learning: Able to verbalize current knowledge/experience, Able to verbalize/acknowledge new learning, and Able to retain information      Endings: None Reported    Modes of Intervention: Education, Support, Socialization, Exploration, Clarifying, and Problem-solving      Discipline Responsible: /Counselor      Signature:  AYESHA Lira

## 2024-04-11 NOTE — GROUP NOTE
Group Therapy Note    Date: 4/11/2024    Group Start Time: 1045  Group End Time: 1115  Group Topic: Recreational    [facility] 7[facility] ACUTE BH 2    Shiloh Prado CTRS    Group Therapy Note    Attendees: 7    Date: 4/11/2024  Start Time: 1045  End Time:  1115  Number of Participants: 7    Type of Group: Recreational    Name:  Roll and Respond Coping Skill Discussion    Patient's Goal:  ID positive coping skills through roll and respond game.    Notes:  Nursing students facilitated roll and respond game with nursing student instructor and CTRS in observance. Patient followed directions and was actively engaged in game and group discussion.    Status After Intervention:  Improved    Participation Level: Active Listener and Interactive    Participation Quality: Appropriate, Attentive, Sharing, and Supportive      Speech:  normal      Thought Process/Content: Logical  Linear      Affective Functioning: Congruent      Mood:  Appropriate      Level of consciousness:  Alert and Attentive      Response to Learning: Able to verbalize current knowledge/experience, Able to verbalize/acknowledge new learning, Able to retain information, Capable of insight, Able to change behavior, and Progressing to goal      Endings: None Reported    Modes of Intervention: Education, Support, Socialization, Exploration, Clarifying, Problem-solving, and Activity      Discipline Responsible: Psychoeducational Specialist      Signature:  JULISSA Mcbride

## 2024-04-11 NOTE — PLAN OF CARE
INTERVENTIONS:  1. Assess impact of patient's symptoms on level of functioning, self care needs and offer support as indicated  2. Assess patient/family knowledge of depression, impact on illness and need for teaching  3. Provide emotional support, presence and reassurance  4. Assess for possible suicidal thoughts or ideation. If patient expresses suicidal thoughts or statements do not leave alone, initiate Suicide Precautions, move to a room close to the nursing station and obtain sitter  5. Initiate consults as appropriate with Mental Health Professional, Spiritual Care, Psychosocial CNS, and consider a recommendation to the LIP for a Psychiatric Consultation  4/11/2024 1319 by Allison Lazaro, RN  Outcome: Progressing  4/11/2024 0305 by Jerri Mcgill, RN  Outcome: Progressing     Problem: Spiritual Care  Goal: Pt/Family able to move forward in process of forgiving self, others, and/or higher power  Description: INTERVENTIONS:  1. Assist patient/family to overcome blocks to healing by use of spiritual practices (prayer, meditation, guided imagery, reiki, breath work, etc).  2. De-myth guilt and help patient/family identify possible irrational spiritual/cultural beliefs and values.  3. Explore possibilities of making amends & reconciliation with self, others, and/or a greater power.  4. Guide patient/family in identifying painful feelings of guilt.  5. Help patient/famiy explore and identify spiritual beliefs, cultural understandings or values that may help or hinder letting go of issue.  6. Help patient/family explore feelings of anger, bitterness, resentment.  7. Help patient/family identify and examine the situation in which these feelings are experienced.  8. Help patient/family identify destructive displacement of feelings onto other individuals.  9. Invite use of sacraments/rituals/ceremonies as appropriate (e.g. - confession, anointing, smudging).  10. Refer patient/family to formal counseling and/or to  [facility] for further support work.  Outcome: Progressing     Problem: Pain  Goal: Verbalizes/displays adequate comfort level or baseline comfort level  Outcome: Progressing

## 2024-04-11 NOTE — PROGRESS NOTES
BEHAVIORAL HEALTH FOLLOW-UP NOTE     2024     Patient was seen and examined in person, Chart reviewed   Patient's case discussed with staff/team    Chief Complaint: \"I am fine of show to keep my mind occupied.\"    Interim History:   I saw patient's morning along with Dr. Zuniga.  He states that he is \"fine.\"  He states that he is \"just try to keep his mind occupied.\"  He denies suicidal ideations intent or plan denies any auditory or visual hallucinations limited send judgment guarding circumstance of hospitalization need for treatment minimizing the circumstance of this admission.  He has been attending select groups on the unit.    Appetite: [] Normal/Unchanged  [] Increased  [] Decreased      Sleep:       [] Normal/Unchanged  [] Fair       [] Poor              Energy:    [] Normal/Unchanged  [] Increased  [] Decreased        SI [] Present  [] Absent    HI  []Present  [] Absent     Aggression:  [] yes  [] no    Patient is [] able  [] unable to CONTRACT FOR SAFETY     PAST MEDICAL/PSYCHIATRIC HISTORY:   Past Medical History:   Diagnosis Date    Basal cell carcinoma of skin 2017    Cancer (HCC) 2011    skin- head    Depression     Gastroesophageal reflux disease without esophagitis 2020    Hypertension     Severe episode of recurrent major depressive disorder, without psychotic features ([facility]) 2017       FAMILY/SOCIAL HISTORY:  Family History   Problem Relation Age of Onset    Stroke Mother     Alcohol Abuse Father     Cancer Father      Social History     Socioeconomic History    Marital status: Single     Spouse name: Not on file    Number of children: 1    Years of education: 10    Highest education level: Not on file   Occupational History    Not on file   Tobacco Use    Smoking status: Former     Current packs/day: 0.00     Average packs/day: 0.5 packs/day for 24.0 years (12.0 ttl pk-yrs)     Types: Cigarettes     Start date: 1984     Quit date: 2008     Years since quittin.2

## 2024-04-11 NOTE — PLAN OF CARE
Problem: Safety - Adult  Goal: Free from fall injury  Outcome: Progressing     Problem: Anxiety  Goal: Will report anxiety at manageable levels  Description: INTERVENTIONS:  1. Administer medication as ordered  2. Teach and rehearse alternative coping skills  3. Provide emotional support with 1:1 interaction with staff  Outcome: Progressing     Problem: Decision Making  Goal: Pt/Family able to effectively weigh alternatives and participate in decision making related to treatment and care  Description: INTERVENTIONS:  1. Determine when there are differences between patient's view, family's view, and healthcare provider's view of condition  2. Facilitate patient and family articulation of goals for care  3. Help patient and family identify pros/cons of alternative solutions  4. Provide information as requested by patient/family  5. Respect patient/family right to receive or not to receive information  6. Serve as a liaison between patient and family and health care team  7. Initiate Consults from Ethics, Palliative Care or initiate Family Care Conference as is appropriate  Outcome: Progressing     Problem: Depression/Self Harm  Goal: Effect of psychiatric condition will be minimized and patient will be protected from self harm  Description: INTERVENTIONS:  1. Assess impact of patient's symptoms on level of functioning, self care needs and offer support as indicated  2. Assess patient/family knowledge of depression, impact on illness and need for teaching  3. Provide emotional support, presence and reassurance  4. Assess for possible suicidal thoughts or ideation. If patient expresses suicidal thoughts or statements do not leave alone, initiate Suicide Precautions, move to a room close to the nursing station and obtain sitter  5. Initiate consults as appropriate with Mental Health Professional, Spiritual Care, Psychosocial CNS, and consider a recommendation to the LIP for a Psychiatric Consultation  Outcome:

## 2024-04-11 NOTE — BH NOTE
Patient denies suicidal ideation, homicidal ideations and AVH. Rates his anxiety and depression a 2/10 at this time. Presents flat, sad, calm and cooperative during assessment. Poor eye contact. Patient is isolative to his room this evening..  Medications taken without issue.  No complaints or concerns verbalized at this time.  No unit problems reported.  Will continue to observe and support.

## 2024-04-11 NOTE — PROGRESS NOTES
Patient declined invitation to the following groups:    Community Meeting    Patient will continue to be provided with opportunities to enhance leisure skills/interests and/or coping mechanisms.

## 2024-04-12 LAB — HBA1C MFR BLD: 5.6 % (ref 4–5.6)

## 2024-04-12 PROCEDURE — 83036 HEMOGLOBIN GLYCOSYLATED A1C: CPT

## 2024-04-12 PROCEDURE — 6370000000 HC RX 637 (ALT 250 FOR IP): Performed by: NURSE PRACTITIONER

## 2024-04-12 PROCEDURE — 6370000000 HC RX 637 (ALT 250 FOR IP): Performed by: PSYCHIATRY & NEUROLOGY

## 2024-04-12 PROCEDURE — 36415 COLL VENOUS BLD VENIPUNCTURE: CPT

## 2024-04-12 PROCEDURE — 99232 SBSQ HOSP IP/OBS MODERATE 35: CPT | Performed by: NURSE PRACTITIONER

## 2024-04-12 PROCEDURE — 1240000000 HC EMOTIONAL WELLNESS R&B

## 2024-04-12 RX ORDER — DIVALPROEX SODIUM 500 MG/1
500 TABLET, DELAYED RELEASE ORAL NIGHTLY
Qty: 30 TABLET | Refills: 0 | Status: SHIPPED | OUTPATIENT
Start: 2024-04-12 | End: 2024-05-12

## 2024-04-12 RX ORDER — LANOLIN ALCOHOL/MO/W.PET/CERES
6 CREAM (GRAM) TOPICAL NIGHTLY PRN
COMMUNITY
Start: 2024-04-12

## 2024-04-12 RX ORDER — DIVALPROEX SODIUM 250 MG/1
250 TABLET, DELAYED RELEASE ORAL DAILY
Qty: 30 TABLET | Refills: 0 | Status: SHIPPED | OUTPATIENT
Start: 2024-04-13 | End: 2024-05-13

## 2024-04-12 RX ADMIN — HYDROXYZINE PAMOATE 50 MG: 25 CAPSULE ORAL at 22:09

## 2024-04-12 RX ADMIN — SERTRALINE HYDROCHLORIDE 50 MG: 50 TABLET ORAL at 09:18

## 2024-04-12 RX ADMIN — Medication 3 MG: at 22:08

## 2024-04-12 RX ADMIN — DIVALPROEX SODIUM 250 MG: 250 TABLET, DELAYED RELEASE ORAL at 09:18

## 2024-04-12 RX ADMIN — DIVALPROEX SODIUM 500 MG: 500 TABLET, DELAYED RELEASE ORAL at 22:08

## 2024-04-12 ASSESSMENT — PAIN - FUNCTIONAL ASSESSMENT: PAIN_FUNCTIONAL_ASSESSMENT: ACTIVITIES ARE NOT PREVENTED

## 2024-04-12 ASSESSMENT — PAIN DESCRIPTION - LOCATION: LOCATION: BACK

## 2024-04-12 ASSESSMENT — PAIN DESCRIPTION - PAIN TYPE: TYPE: ACUTE PAIN

## 2024-04-12 ASSESSMENT — PAIN DESCRIPTION - FREQUENCY: FREQUENCY: INTERMITTENT

## 2024-04-12 ASSESSMENT — PAIN SCALES - GENERAL: PAINLEVEL_OUTOF10: 4

## 2024-04-12 ASSESSMENT — PAIN DESCRIPTION - DESCRIPTORS: DESCRIPTORS: ACHING

## 2024-04-12 ASSESSMENT — PAIN DESCRIPTION - ONSET: ONSET: GRADUAL

## 2024-04-12 NOTE — PROGRESS NOTES
CLINICAL PHARMACY NOTE: MEDS TO BEDS    Total # of Prescriptions Filled: 3   The following medications were delivered to the patient:  Depakote dr 250mg  Depakote 500mg  Sertraline  50mg    Additional Documentation:  Delivered to Allison HEARD 4-12-24

## 2024-04-12 NOTE — PROGRESS NOTES
BEHAVIORAL HEALTH FOLLOW-UP NOTE     2024     Patient was seen and examined in person, Chart reviewed   Patient's case discussed with staff/team    Chief Complaint: \"I am feeling a little more back to myself\"    Interim History:   Andreas is seen in treatment team this morning. He states that he is feeling better than when he came in and is pretty much back to himself. He is future focused, denies suicidal ideations this morning.  No overt or covert signs of psychosis or paranoia. Linear and coherent thought process.  He has been attending select groups on the unit.  Reporting some minor back pain. Offered IBU or tylenol.      Appetite: [x] Normal/Unchanged  [] Increased  [] Decreased      Sleep:       [x] Normal/Unchanged  [] Fair       [] Poor              Energy:    [x] Normal/Unchanged  [] Increased  [] Decreased        SI [] Present  [x] Absent    HI  []Present  [x] Absent     Aggression:  [] yes  [x] no    Patient is [x] able  [] unable to CONTRACT FOR SAFETY     PAST MEDICAL/PSYCHIATRIC HISTORY:   Past Medical History:   Diagnosis Date    Basal cell carcinoma of skin 2017    Cancer ([facility]) 2011    skin- head    Depression     Gastroesophageal reflux disease without esophagitis 2020    Hypertension     Severe episode of recurrent major depressive disorder, without psychotic features ([facility]) 2017       FAMILY/SOCIAL HISTORY:  Family History   Problem Relation Age of Onset    Stroke Mother     Alcohol Abuse Father     Cancer Father      Social History     Socioeconomic History    Marital status: Single     Spouse name: Not on file    Number of children: 1    Years of education: 10    Highest education level: Not on file   Occupational History    Not on file   Tobacco Use    Smoking status: Former     Current packs/day: 0.00     Average packs/day: 0.5 packs/day for 24.0 years (12.0 ttl pk-yrs)     Types: Cigarettes     Start date: 1984     Quit date: 2008     Years since quittin.2

## 2024-04-12 NOTE — BH NOTE
Patient denies suicidal ideation, homicidal ideations and AVH. Reports anxiety a 3/10 and depression 0/10. Flat, sad affect that brightens with conversation. Presents calm and cooperative during assessment.  Patient is out on the unit and is social with peers.  Ate HS snack. Medications taken without issue.  No complaints or concerns verbalized at this time.  No unit problems reported.  Will continue to observe and support.

## 2024-04-12 NOTE — GROUP NOTE
Group Therapy Note    Date: 4/12/2024    Group Start Time: 1405  Group End Time: 1450  Group Topic: Cognitive Skills    [facility] 7[facility] ACUTE BH 2    Inna Page MSW, LSW        Group Therapy Note    Attendees: 6       Patient's Goal:  Pt will be able to discuss different attachment styles and identify ways to create a secure attachment.     Notes:  Pt was an active participant in group discussion.     Status After Intervention:  Improved    Participation Level: Active Listener and Minimal    Participation Quality: Appropriate, Attentive, Sharing, and Supportive      Speech:  normal      Thought Process/Content: Logical  Linear      Affective Functioning: Flat      Mood: anxious and depressed      Level of consciousness:  Alert, Oriented x4, and Attentive      Response to Learning: Able to verbalize current knowledge/experience, Able to verbalize/acknowledge new learning, Able to retain information, Capable of insight, and Progressing to goal      Endings: None Reported    Modes of Intervention: Education, Support, Socialization, Exploration, Clarifying, and Problem-solving      Discipline Responsible: /Counselor      Signature:  AYESHA Salazar LSW

## 2024-04-12 NOTE — PLAN OF CARE
Problem: Safety - Adult  Goal: Free from fall injury  4/11/2024 2337 by Jerri Mcgill RN  Outcome: Progressing     Problem: Anxiety  Goal: Will report anxiety at manageable levels  Description: INTERVENTIONS:  1. Administer medication as ordered  2. Teach and rehearse alternative coping skills  3. Provide emotional support with 1:1 interaction with staff  4/11/2024 2337 by Jerri Mcgill RN  Outcome: Progressing     Problem: Decision Making  Goal: Pt/Family able to effectively weigh alternatives and participate in decision making related to treatment and care  Description: INTERVENTIONS:  1. Determine when there are differences between patient's view, family's view, and healthcare provider's view of condition  2. Facilitate patient and family articulation of goals for care  3. Help patient and family identify pros/cons of alternative solutions  4. Provide information as requested by patient/family  5. Respect patient/family right to receive or not to receive information  6. Serve as a liaison between patient and family and health care team  7. Initiate Consults from Ethics, Palliative Care or initiate Family Care Conference as is appropriate  4/11/2024 2337 by Jerri Mcgill RN  Outcome: Progressing     Problem: Depression/Self Harm  Goal: Effect of psychiatric condition will be minimized and patient will be protected from self harm  Description: INTERVENTIONS:  1. Assess impact of patient's symptoms on level of functioning, self care needs and offer support as indicated  2. Assess patient/family knowledge of depression, impact on illness and need for teaching  3. Provide emotional support, presence and reassurance  4. Assess for possible suicidal thoughts or ideation. If patient expresses suicidal thoughts or statements do not leave alone, initiate Suicide Precautions, move to a room close to the nursing station and obtain sitter  5. Initiate consults as appropriate with Mental Health Professional,

## 2024-04-12 NOTE — CARE COORDINATION
Pt was seen during treatment team. Pt is calm and cooperative. He states that he is feeling better, that he feels like he is back to himself and his mood feels baseline. Pt denied SI/HI/AVH. He feels that he needs one more day of treatment prior to returning home to ensure stability. Pt denied questions/concerns for treatment team.

## 2024-04-12 NOTE — PLAN OF CARE
Patient denies SI/HI/AVH. Denies depression. Reports anxiety rated 1/10. Patient appears flat but brightens with conversation. No paranoia or delusions reported or observed. Patient is out on the unit, social with select peers, taking prescribed medications, and attending groups. Will continue to monitor and intervene as needed.     Problem: Safety - Adult  Goal: Free from fall injury  4/12/2024 1032 by Allison Lazaro RN  Outcome: Progressing  4/11/2024 2337 by Jerri Mcgill RN  Outcome: Progressing     Problem: Anxiety  Goal: Will report anxiety at manageable levels  Description: INTERVENTIONS:  1. Administer medication as ordered  2. Teach and rehearse alternative coping skills  3. Provide emotional support with 1:1 interaction with staff  4/12/2024 1032 by Allison Lazaro RN  Outcome: Progressing  4/11/2024 2337 by Jerri Mcgill RN  Outcome: Progressing     Problem: Decision Making  Goal: Pt/Family able to effectively weigh alternatives and participate in decision making related to treatment and care  Description: INTERVENTIONS:  1. Determine when there are differences between patient's view, family's view, and healthcare provider's view of condition  2. Facilitate patient and family articulation of goals for care  3. Help patient and family identify pros/cons of alternative solutions  4. Provide information as requested by patient/family  5. Respect patient/family right to receive or not to receive information  6. Serve as a liaison between patient and family and health care team  7. Initiate Consults from Ethics, Palliative Care or initiate Family Care Conference as is appropriate  4/12/2024 1032 by Allison Lazaro RN  Outcome: Progressing  4/11/2024 2337 by Jerri Mcgill RN  Outcome: Progressing     Problem: Depression/Self Harm  Goal: Effect of psychiatric condition will be minimized and patient will be protected from self harm  Description: INTERVENTIONS:  1. Assess impact of patient's symptoms on  level of functioning, self care needs and offer support as indicated  2. Assess patient/family knowledge of depression, impact on illness and need for teaching  3. Provide emotional support, presence and reassurance  4. Assess for possible suicidal thoughts or ideation. If patient expresses suicidal thoughts or statements do not leave alone, initiate Suicide Precautions, move to a room close to the nursing station and obtain sitter  5. Initiate consults as appropriate with Mental Health Professional, Spiritual Care, Psychosocial CNS, and consider a recommendation to the [facility]IP for a Psychiatric Consultation  4/12/2024 1032 by Allison Lazaro, RN  Outcome: Progressing  4/11/2024 2337 by Jerri Mcgill, RN  Outcome: Progressing     Problem: Spiritual Care  Goal: Pt/Family able to move forward in process of forgiving self, others, and/or higher power  Description: INTERVENTIONS:  1. Assist patient/family to overcome blocks to healing by use of spiritual practices (prayer, meditation, guided imagery, reiki, breath work, etc).  2. De-myth guilt and help patient/family identify possible irrational spiritual/cultural beliefs and values.  3. Explore possibilities of making amends & reconciliation with self, others, and/or a greater power.  4. Guide patient/family in identifying painful feelings of guilt.  5. Help patient/famiy explore and identify spiritual beliefs, cultural understandings or values that may help or hinder letting go of issue.  6. Help patient/family explore feelings of anger, bitterness, resentment.  7. Help patient/family identify and examine the situation in which these feelings are experienced.  8. Help patient/family identify destructive displacement of feelings onto other individuals.  9. Invite use of sacraments/rituals/ceremonies as appropriate (e.g. - confession, anointing, smudging).  10. Refer patient/family to formal counseling and/or to [facility] for further support work.  Outcome:

## 2024-04-12 NOTE — PLAN OF CARE
Denies S/HI  denies hallucinations   1:1 maintained   pt states \"I'm going to die\" when asked why he made that statement he responded \"because my head hurts\"  reassurance given   out of bed with assist of walker for breakfast then returned to bed    cooperative with meds  will continue to monitor

## 2024-04-13 VITALS
DIASTOLIC BLOOD PRESSURE: 69 MMHG | SYSTOLIC BLOOD PRESSURE: 144 MMHG | RESPIRATION RATE: 18 BRPM | HEIGHT: 60 IN | BODY MASS INDEX: 27.48 KG/M2 | WEIGHT: 140 LBS | HEART RATE: 99 BPM | OXYGEN SATURATION: 98 % | TEMPERATURE: 97.1 F

## 2024-04-13 PROCEDURE — 6370000000 HC RX 637 (ALT 250 FOR IP): Performed by: NURSE PRACTITIONER

## 2024-04-13 PROCEDURE — 99239 HOSP IP/OBS DSCHRG MGMT >30: CPT | Performed by: NURSE PRACTITIONER

## 2024-04-13 PROCEDURE — 6370000000 HC RX 637 (ALT 250 FOR IP): Performed by: PSYCHIATRY & NEUROLOGY

## 2024-04-13 RX ADMIN — ACETAMINOPHEN 650 MG: 325 TABLET ORAL at 09:04

## 2024-04-13 RX ADMIN — DIVALPROEX SODIUM 250 MG: 250 TABLET, DELAYED RELEASE ORAL at 09:04

## 2024-04-13 RX ADMIN — SERTRALINE HYDROCHLORIDE 50 MG: 50 TABLET ORAL at 09:04

## 2024-04-13 ASSESSMENT — PAIN SCALES - GENERAL
PAINLEVEL_OUTOF10: 0
PAINLEVEL_OUTOF10: 4
PAINLEVEL_OUTOF10: 4

## 2024-04-13 ASSESSMENT — PAIN DESCRIPTION - LOCATION
LOCATION: BACK
LOCATION: BACK

## 2024-04-13 ASSESSMENT — PAIN DESCRIPTION - DESCRIPTORS
DESCRIPTORS: ACHING;SORE
DESCRIPTORS: ACHING;SORE

## 2024-04-13 ASSESSMENT — PAIN DESCRIPTION - PAIN TYPE: TYPE: ACUTE PAIN

## 2024-04-13 ASSESSMENT — PAIN DESCRIPTION - FREQUENCY: FREQUENCY: INTERMITTENT

## 2024-04-13 ASSESSMENT — PAIN - FUNCTIONAL ASSESSMENT: PAIN_FUNCTIONAL_ASSESSMENT: ACTIVITIES ARE NOT PREVENTED

## 2024-04-13 ASSESSMENT — PAIN DESCRIPTION - ORIENTATION
ORIENTATION: LOWER
ORIENTATION: LOWER

## 2024-04-13 ASSESSMENT — PAIN DESCRIPTION - ONSET: ONSET: GRADUAL

## 2024-04-13 NOTE — PROGRESS NOTES
[facility]  Discharge Note    Pt discharged with followings belongings:   Dental Appliances: None  Vision - Corrective Lenses: Eyeglasses  Hearing Aid: None  Jewelry: None  Body Piercings Removed: N/A  Clothing: Other (Comment)  Other Valuables: Wallet   Valuables sent home with or returned to patient. Patient educated on aftercare instructions: Yes  Information faxed to N/A by N/A  at 11:16 AM .Patient verbalize understanding of AVS:  Yes.      Status EXAM upon discharge:  Mental Status and Behavioral Exam  Normal: No  Level of Assistance: Independent/Self  Facial Expression: Brightened  Affect: Congruent  Level of Consciousness: Alert  Frequency of Checks: 4 times per hour, close  Mood:Normal: No  Mood: Anxious  Motor Activity:Normal: No  Motor Activity: Decreased  Eye Contact: Good  Observed Behavior: Cooperative, Friendly  Sexual Misconduct History: Current - no  Preception: [place] to person, [place] to time, [place] to place, [place] to situation  Attention:Normal: No  Attention: Distractible  Thought Processes: Unremarkable  Thought Content:Normal: Yes  Thought Content: Preoccupations  Depression Symptoms: Change in energy level  Anxiety Symptoms: Generalized  Shea Symptoms: No problems reported or observed.  Hallucinations: None  Delusions: No  Memory:Normal: Yes  Insight and Judgment: Yes  Insight and Judgment: Other (comment), Poor judgment, Poor insight (improving)    Tobacco Screening:  Practical Counseling, on admission, edi X, if applicable and completed (first 3 are required if patient doesn't refuse):            ( ) Recognizing danger situations (included triggers and roadblocks)                    ( ) Coping skills (new ways to manage stress,relaxation techniques, changing routine, distraction)                                                           ( ) Basic information about quitting (benefits of quitting, techniques in how to quit, available resources  ( ) Referral for counseling

## 2024-04-13 NOTE — PLAN OF CARE
Problem: Anxiety  Goal: Will report anxiety at manageable levels  Description: INTERVENTIONS:  1. Administer medication as ordered  2. Teach and rehearse alternative coping skills  3. Provide emotional support with 1:1 interaction with staff  4/12/2024 2159 by Evelyn Lake, RN  Outcome: Progressing     Problem: Depression/Self Harm  Goal: Effect of psychiatric condition will be minimized and patient will be protected from self harm  Description: INTERVENTIONS:  1. Assess impact of patient's symptoms on level of functioning, self care needs and offer support as indicated  2. Assess patient/family knowledge of depression, impact on illness and need for teaching  3. Provide emotional support, presence and reassurance  4. Assess for possible suicidal thoughts or ideation. If patient expresses suicidal thoughts or statements do not leave alone, initiate Suicide Precautions, move to a room close to the nursing station and obtain sitter  5. Initiate consults as appropriate with Mental Health Professional, Spiritual Care, Psychosocial CNS, and consider a recommendation to the [facility] for a Psychiatric Consultation  4/12/2024 2159 by Evelyn Lake, RN  Outcome: Progressing

## 2024-04-13 NOTE — TRANSITION OF CARE
Behavioral Health Transition Record to Provider    Patient Name: Prabhakar Reynolds  YOB: 1970   Medical Record Number: [id number]  Date of Admission: 4/10/2024  1:14 AM   Date of Discharge: 4/13/2024    Attending Provider: El Barraza MD   Discharging Provider: El Barraza MD  To contact this individual call [phone] and ask the  to page.  If unavailable, ask to be transferred to Behavioral Health Provider on call.  A Behavioral Health Provider will be available on call 24/7 and during holidays.    Primary Care Provider: Sg Rodriguez MD    No Known Allergies    Reason for Admission: Prabhakar Reynolds is a 53-year-old male with history of major depression and past inpatient psychiatric hospitalizations presenting to [facility] emergency department via EMS reporting suicidal ideation continuing to worsen over the last 3-4 weeks.  Placed on involuntary hold by ER doctor for suicidal ideation with plan to overdose.  Precipitating factors may include loneliness, and isolation.     Admission Diagnosis: Suicidal ideation [R45.851]  Major depression, single episode [F32.9]    * No surgery found *    Results for orders placed or performed during the hospital encounter of 04/10/24   Serum Drug Screen   Result Value Ref Range    Acetaminophen Level <5 (L) 10.0 - 30.0 ug/mL    Ethanol <10 <10 mg/dL    Salicylate Lvl <0.3 0.0 - 30.0 mg/dL    Toxic Tricyclic Sc,Blood NEGATIVE NEGATIVE   Urine Drug Screen   Result Value Ref Range    Amphetamine Screen, Ur NEGATIVE NEGATIVE    Barbiturate Screen, Ur NEGATIVE NEGATIVE    Benzodiazepine Screen, Urine NEGATIVE NEGATIVE    Cocaine Metabolite, Urine NEGATIVE NEGATIVE    Methadone Screen, Urine NEGATIVE NEGATIVE    Opiates, Urine NEGATIVE NEGATIVE    Phencyclidine, Urine NEGATIVE NEGATIVE    Cannabinoid Scrn, Ur NEGATIVE NEGATIVE    Oxycodone Screen, Ur NEGATIVE NEGATIVE    Fentanyl, Ur NEGATIVE NEGATIVE    Buprenorphine Urine NEGATIVE NEGATIVE  changed:   how much to take  when to take this  reasons to take this            CONTINUE taking these medications      * divalproex 500 MG DR tablet  Commonly known as: DEPAKOTE  Take 1 tablet by mouth nightly     * divalproex 250 MG DR tablet  Commonly known as: DEPAKOTE  Take 1 tablet by mouth daily     ibuprofen 600 MG tablet  Commonly known as: ADVIL;MOTRIN  Take 1 tablet by mouth 3 times daily as needed for Pain     sertraline 50 MG tablet  Commonly known as: ZOLOFT  Take 1 tablet by mouth daily           * This list has 2 medication(s) that are the same as other medications prescribed for you. Read the directions carefully, and ask your doctor or other care provider to review them with you.                STOP taking these medications      traZODone 100 MG tablet  Commonly known as: DESYREL               Where to Get Your Medications        These medications were sent to [facility] [address] - P [phone] - F [phone]  [facility] [place] [id number]      Phone: [phone]   divalproex 250 MG DR tablet  divalproex 500 MG DR tablet  sertraline 50 MG tablet       You can get these medications from any pharmacy    You don't need a prescription for these medications  melatonin 3 MG Tabs tablet         Unresulted Labs (24h ago, onward)      None            To obtain results of studies pending at discharge, please contact [phone]    Follow-up Information       Follow up With Specialties Details Why Contact Info    [facility]  Go on 4/16/2024 10:40 am in office mental health medication management [place] [address]     Phone: [phone]    Fax [phone]    [facility]  Go on 4/18/2024 1:30 pm in office mental health counseling appointment Amita Black [place] [id number]   Phone: [phone]   Fax [phone]             Advanced Directive:   Does the patient have an appointed

## 2024-04-13 NOTE — GROUP NOTE
Group Therapy Note    Date: 4/13/2024    Group Start Time: 1030  Group End Time: 1045  Group Topic: Community Meeting    [facility] AC[facility] 2    [facility]    Group Therapy Note    Attendees: 6    Date: 4/13/2024  Start Time: 1030  End Time:  1045  Number of Participants: 6    Type of Group: Community Meeting    Was updated on expectations of the unit, staffing, and programming.  Patient shared goal for today as \"Recognize my flaws so I can improve them.\"    Status After Intervention:  Improved    Participation Level: Active Listener and Interactive    Participation Quality: Appropriate, Attentive, Sharing, and Supportive      Speech:  normal      Thought Process/Content: Logical  Linear      Affective Functioning: Congruent      Mood:  Appropriate      Level of consciousness:  Alert and Attentive      Response to Learning: Able to verbalize current knowledge/experience, Able to verbalize/acknowledge new learning, Able to retain information, Capable of insight, Able to change behavior, and Progressing to goal      Endings: None Reported    Modes of Intervention: Education, Support, Socialization, Exploration, Clarifying, and Problem-solving      Discipline Responsible: Psychoeducational Specialist      Signature:  JULISSA Mcbride

## 2024-04-13 NOTE — CARE COORDINATION
Met with pt to discuss d/c planning. Pt stated \"I feel good, I do live alone, but I'm good\" Pt was smiling and stated he has no furhter  concerns. He plans to take the bus back home upon d/c

## 2024-04-13 NOTE — DISCHARGE SUMMARY
DISCHARGE SUMMARY      Patient ID:  Prabhakar Reynolds  [id number]  53 y.o.  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  Placed on involuntary hold by ER doctor for suicidal ideation with plan to overdose.  Precipitating factors may include loneliness, and isolation.     PAST MEDICAL/PSYCHIATRIC HISTORY:   Past Medical History:   Diagnosis Date    Basal cell carcinoma of skin 11/8/2017    Cancer (HCC) 1/2011    skin- head    Depression     Gastroesophageal reflux disease without esophagitis 2/12/2020    Hypertension     Severe episode of recurrent major depressive disorder, without psychotic features (HCC) 8/18/2017       FAMILY/SOCIAL HISTORY:  Family History   Problem Relation Age of Onset    Stroke Mother     Alcohol Abuse Father     Cancer Father      Social History     Socioeconomic History    Marital status: Single     Spouse name: Not on file    Number of children: 1    Years of education: 10    Highest education level: Not on file   Occupational History    Not on file   Tobacco Use    Smoking status: Former     Current

## 2024-04-13 NOTE — PROGRESS NOTES
Patient denies suicidal ideation, homicidal ideations and AVH.  Ptstates anxiety 3/10 and depression 1/10 because \"it''s always there a little bit\". Presents flat, brightening upon conversation, calm and cooperative during assessment.  Patient is out on the unit and is social with select peers.  Medications taken without issue.  No complaints or concerns verbalized at this time.  No unit problems reported.  Will continue to observe and support.

## 2024-04-13 NOTE — PLAN OF CARE
Patient denies SI/HI/AVH. Reports anxiety 3/10 and depression 1/10. Appears bright, friendly, pleasant and cooperative. Patient is future focused, attends groups, and takes prescribed medications. Will continue to monitor and intervene as needed.     Problem: Safety - Adult  Goal: Free from fall injury  Outcome: Progressing     Problem: Anxiety  Goal: Will report anxiety at manageable levels  Description: INTERVENTIONS:  1. Administer medication as ordered  2. Teach and rehearse alternative coping skills  3. Provide emotional support with 1:1 interaction with staff  4/13/2024 1047 by Allison Lazaro, RN  Outcome: Progressing  4/12/2024 2159 by Evelyn Lake RN  Outcome: Progressing     Problem: Decision Making  Goal: Pt/Family able to effectively weigh alternatives and participate in decision making related to treatment and care  Description: INTERVENTIONS:  1. Determine when there are differences between patient's view, family's view, and healthcare provider's view of condition  2. Facilitate patient and family articulation of goals for care  3. Help patient and family identify pros/cons of alternative solutions  4. Provide information as requested by patient/family  5. Respect patient/family right to receive or not to receive information  6. Serve as a liaison between patient and family and health care team  7. Initiate Consults from Ethics, Palliative Care or initiate Family Care Conference as is appropriate  Outcome: Progressing     Problem: Depression/Self Harm  Goal: Effect of psychiatric condition will be minimized and patient will be protected from self harm  Description: INTERVENTIONS:  1. Assess impact of patient's symptoms on level of functioning, self care needs and offer support as indicated  2. Assess patient/family knowledge of depression, impact on illness and need for teaching  3. Provide emotional support, presence and reassurance  4. Assess for possible suicidal thoughts or ideation. If

## 2024-04-17 DIAGNOSIS — S62.316A CLOSED DISPLACED FRACTURE OF BASE OF FIFTH METACARPAL BONE OF RIGHT HAND, INITIAL ENCOUNTER: Primary | ICD-10-CM

## 2024-05-17 ENCOUNTER — OFFICE VISIT (OUTPATIENT)
Dept: ORTHOPEDIC SURGERY | Age: 54
End: 2024-05-17

## 2024-05-17 VITALS — HEIGHT: 60 IN | BODY MASS INDEX: 27.48 KG/M2 | WEIGHT: 140 LBS

## 2024-05-17 DIAGNOSIS — S62.316A CLOSED DISPLACED FRACTURE OF BASE OF FIFTH METACARPAL BONE OF RIGHT HAND, INITIAL ENCOUNTER: Primary | ICD-10-CM

## 2024-05-17 PROCEDURE — 99024 POSTOP FOLLOW-UP VISIT: CPT | Performed by: ORTHOPAEDIC SURGERY

## 2024-05-17 ASSESSMENT — ENCOUNTER SYMPTOMS
SHORTNESS OF BREATH: 0
ABDOMINAL DISTENTION: 0
ALLERGIC/IMMUNOLOGIC NEGATIVE: 1
EYE DISCHARGE: 0
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
## 2024-08-01 ENCOUNTER — HOSPITAL ENCOUNTER (OUTPATIENT)
Dept: MRI IMAGING | Age: 54
Discharge: HOME | End: 2024-08-03
Attending: NEUROLOGICAL SURGERY
Payer: MEDICAID

## 2024-08-01 DIAGNOSIS — D32.9 MENINGIOMA (HCC): ICD-10-CM

## 2024-08-01 PROCEDURE — 6360000004 HC RX CONTRAST MEDICATION: Performed by: RADIOLOGY

## 2024-08-01 PROCEDURE — A9579 GAD-BASE MR CONTRAST NOS,1ML: HCPCS | Performed by: RADIOLOGY

## 2024-08-01 PROCEDURE — 70553 MRI BRAIN STEM W/O & W/DYE: CPT

## 2024-08-01 RX ADMIN — GADOTERIDOL 15 ML: 279.3 INJECTION, SOLUTION INTRAVENOUS at 13:45

## 2024-08-20 ENCOUNTER — HOSPITAL ENCOUNTER (INPATIENT)
Age: 54
LOS: 2 days | Discharge: HOME | End: 2024-08-22
Attending: EMERGENCY MEDICINE | Admitting: INTERNAL MEDICINE
Payer: MEDICAID

## 2024-08-20 ENCOUNTER — APPOINTMENT (OUTPATIENT)
Dept: CT IMAGING | Age: 54
End: 2024-08-20
Attending: EMERGENCY MEDICINE
Payer: MEDICAID

## 2024-08-20 ENCOUNTER — APPOINTMENT (OUTPATIENT)
Dept: GENERAL RADIOLOGY | Age: 54
End: 2024-08-20
Payer: MEDICAID

## 2024-08-20 DIAGNOSIS — R09.02 HYPOXEMIA: ICD-10-CM

## 2024-08-20 DIAGNOSIS — J18.9 PNEUMONIA OF RIGHT LUNG DUE TO INFECTIOUS ORGANISM, UNSPECIFIED PART OF LUNG: Primary | ICD-10-CM

## 2024-08-20 LAB
ALBUMIN SERPL-MCNC: 4.5 G/DL (ref 3.5–5.2)
ALP SERPL-CCNC: 103 U/L (ref 40–129)
ALT SERPL-CCNC: 18 U/L (ref 0–40)
ANION GAP SERPL CALCULATED.3IONS-SCNC: 14 MMOL/L (ref 7–16)
AST SERPL-CCNC: 21 U/L (ref 0–39)
B.E.: -0.3 MMOL/L (ref -3–3)
BASOPHILS # BLD: 0.05 K/UL (ref 0–0.2)
BASOPHILS NFR BLD: 1 % (ref 0–2)
BILIRUB SERPL-MCNC: 1.8 MG/DL (ref 0–1.2)
BUN SERPL-MCNC: 17 MG/DL (ref 6–20)
CALCIUM SERPL-MCNC: 9.9 MG/DL (ref 8.6–10.2)
CHLORIDE SERPL-SCNC: 99 MMOL/L (ref 98–107)
CO2 SERPL-SCNC: 27 MMOL/L (ref 22–29)
COHB: 0.8 % (ref 0–1.5)
CREAT SERPL-MCNC: 1.3 MG/DL (ref 0.7–1.2)
CRITICAL: ABNORMAL
DATE ANALYZED: ABNORMAL
DATE OF COLLECTION: ABNORMAL
EOSINOPHIL # BLD: 0.18 K/UL (ref 0.05–0.5)
EOSINOPHILS RELATIVE PERCENT: 2 % (ref 0–6)
ERYTHROCYTE [DISTWIDTH] IN BLOOD BY AUTOMATED COUNT: 13.1 % (ref 11.5–15)
GFR, ESTIMATED: 67 ML/MIN/1.73M2
GLUCOSE SERPL-MCNC: 89 MG/DL (ref 74–99)
HCO3: 25.3 MMOL/L (ref 22–26)
HCT VFR BLD AUTO: 48.8 % (ref 37–54)
HGB BLD-MCNC: 16.8 G/DL (ref 12.5–16.5)
HHB: 12.1 % (ref 0–5)
IMM GRANULOCYTES # BLD AUTO: 0.04 K/UL (ref 0–0.58)
IMM GRANULOCYTES NFR BLD: 0 % (ref 0–5)
INR PPP: 1.1
LAB: ABNORMAL
LYMPHOCYTES NFR BLD: 1.07 K/UL (ref 1.5–4)
LYMPHOCYTES RELATIVE PERCENT: 11 % (ref 20–42)
Lab: 1836
MAGNESIUM SERPL-MCNC: 2.1 MG/DL (ref 1.6–2.6)
MCH RBC QN AUTO: 29.5 PG (ref 26–35)
MCHC RBC AUTO-ENTMCNC: 34.4 G/DL (ref 32–34.5)
MCV RBC AUTO: 85.6 FL (ref 80–99.9)
METHB: 0.3 % (ref 0–1.5)
MODE: ABNORMAL
MONOCYTES NFR BLD: 0.66 K/UL (ref 0.1–0.95)
MONOCYTES NFR BLD: 7 % (ref 2–12)
NEUTROPHILS NFR BLD: 80 % (ref 43–80)
NEUTS SEG NFR BLD: 7.81 K/UL (ref 1.8–7.3)
O2 SATURATION: 87.8 % (ref 92–98.5)
O2HB: 86.8 % (ref 94–97)
OPERATOR ID: ABNORMAL
PATIENT TEMP: 37 C
PCO2: 44.5 MMHG (ref 35–45)
PH BLOOD GAS: 7.37 (ref 7.35–7.45)
PLATELET # BLD AUTO: 267 K/UL (ref 130–450)
PMV BLD AUTO: 9.9 FL (ref 7–12)
PO2: 56.4 MMHG (ref 75–100)
POTASSIUM SERPL-SCNC: 4.5 MMOL/L (ref 3.5–5)
PROT SERPL-MCNC: 7.8 G/DL (ref 6.4–8.3)
PROTHROMBIN TIME: 12.4 SEC (ref 9.3–12.4)
RBC # BLD AUTO: 5.7 M/UL (ref 3.8–5.8)
SARS-COV-2 RDRP RESP QL NAA+PROBE: NOT DETECTED
SODIUM SERPL-SCNC: 140 MMOL/L (ref 132–146)
SOURCE, BLOOD GAS: ABNORMAL
SPECIMEN DESCRIPTION: NORMAL
THB: 16.7 G/DL (ref 11.5–16.5)
TIME ANALYZED: 1839
TROPONIN I SERPL HS-MCNC: 10 NG/L (ref 0–11)
TROPONIN I SERPL HS-MCNC: 7 NG/L (ref 0–11)
WBC OTHER # BLD: 9.8 K/UL (ref 4.5–11.5)

## 2024-08-20 PROCEDURE — 1200000000 HC SEMI PRIVATE

## 2024-08-20 PROCEDURE — 99285 EMERGENCY DEPT VISIT HI MDM: CPT

## 2024-08-20 PROCEDURE — 96375 TX/PRO/DX INJ NEW DRUG ADDON: CPT

## 2024-08-20 PROCEDURE — 80053 COMPREHEN METABOLIC PANEL: CPT

## 2024-08-20 PROCEDURE — 83735 ASSAY OF MAGNESIUM: CPT

## 2024-08-20 PROCEDURE — 93005 ELECTROCARDIOGRAM TRACING: CPT | Performed by: EMERGENCY MEDICINE

## 2024-08-20 PROCEDURE — 2580000003 HC RX 258: Performed by: EMERGENCY MEDICINE

## 2024-08-20 PROCEDURE — 82805 BLOOD GASES W/O2 SATURATION: CPT

## 2024-08-20 PROCEDURE — 85610 PROTHROMBIN TIME: CPT

## 2024-08-20 PROCEDURE — 71045 X-RAY EXAM CHEST 1 VIEW: CPT

## 2024-08-20 PROCEDURE — 6360000002 HC RX W HCPCS: Performed by: EMERGENCY MEDICINE

## 2024-08-20 PROCEDURE — 87635 SARS-COV-2 COVID-19 AMP PRB: CPT

## 2024-08-20 PROCEDURE — 85025 COMPLETE CBC W/AUTO DIFF WBC: CPT

## 2024-08-20 PROCEDURE — 71275 CT ANGIOGRAPHY CHEST: CPT

## 2024-08-20 PROCEDURE — 96374 THER/PROPH/DIAG INJ IV PUSH: CPT

## 2024-08-20 PROCEDURE — 87040 BLOOD CULTURE FOR BACTERIA: CPT

## 2024-08-20 PROCEDURE — 6370000000 HC RX 637 (ALT 250 FOR IP): Performed by: EMERGENCY MEDICINE

## 2024-08-20 PROCEDURE — 6360000004 HC RX CONTRAST MEDICATION: Performed by: RADIOLOGY

## 2024-08-20 PROCEDURE — 84484 ASSAY OF TROPONIN QUANT: CPT

## 2024-08-20 RX ORDER — IOPAMIDOL 755 MG/ML
75 INJECTION, SOLUTION INTRAVASCULAR
Status: COMPLETED | OUTPATIENT
Start: 2024-08-20 | End: 2024-08-20

## 2024-08-20 RX ORDER — ASPIRIN 81 MG/1
324 TABLET, CHEWABLE ORAL ONCE
Status: COMPLETED | OUTPATIENT
Start: 2024-08-20 | End: 2024-08-20

## 2024-08-20 RX ORDER — FENTANYL CITRATE 50 UG/ML
50 INJECTION, SOLUTION INTRAMUSCULAR; INTRAVENOUS ONCE
Status: COMPLETED | OUTPATIENT
Start: 2024-08-20 | End: 2024-08-20

## 2024-08-20 RX ORDER — ONDANSETRON 2 MG/ML
4 INJECTION INTRAMUSCULAR; INTRAVENOUS ONCE
Status: COMPLETED | OUTPATIENT
Start: 2024-08-20 | End: 2024-08-20

## 2024-08-20 RX ORDER — 0.9 % SODIUM CHLORIDE 0.9 %
1000 INTRAVENOUS SOLUTION INTRAVENOUS ONCE
Status: COMPLETED | OUTPATIENT
Start: 2024-08-20 | End: 2024-08-20

## 2024-08-20 RX ORDER — METOCLOPRAMIDE HYDROCHLORIDE 5 MG/ML
10 INJECTION INTRAMUSCULAR; INTRAVENOUS ONCE
Status: COMPLETED | OUTPATIENT
Start: 2024-08-20 | End: 2024-08-20

## 2024-08-20 RX ORDER — METRONIDAZOLE 500 MG/100ML
500 INJECTION, SOLUTION INTRAVENOUS ONCE
Status: COMPLETED | OUTPATIENT
Start: 2024-08-20 | End: 2024-08-21

## 2024-08-20 RX ADMIN — METRONIDAZOLE 500 MG: 500 INJECTION, SOLUTION INTRAVENOUS at 23:20

## 2024-08-20 RX ADMIN — ASPIRIN 324 MG: 81 TABLET, CHEWABLE ORAL at 18:41

## 2024-08-20 RX ADMIN — IOPAMIDOL 75 ML: 755 INJECTION, SOLUTION INTRAVENOUS at 20:08

## 2024-08-20 RX ADMIN — WATER 1000 MG: 1 INJECTION INTRAMUSCULAR; INTRAVENOUS; SUBCUTANEOUS at 23:18

## 2024-08-20 RX ADMIN — METOCLOPRAMIDE 10 MG: 5 INJECTION, SOLUTION INTRAMUSCULAR; INTRAVENOUS at 22:35

## 2024-08-20 RX ADMIN — FENTANYL CITRATE 50 MCG: 50 INJECTION INTRAMUSCULAR; INTRAVENOUS at 18:43

## 2024-08-20 RX ADMIN — ONDANSETRON 4 MG: 2 INJECTION INTRAMUSCULAR; INTRAVENOUS at 18:42

## 2024-08-20 RX ADMIN — SODIUM CHLORIDE 1000 ML: 9 INJECTION, SOLUTION INTRAVENOUS at 22:17

## 2024-08-20 ASSESSMENT — PAIN SCALES - GENERAL
PAINLEVEL_OUTOF10: 8
PAINLEVEL_OUTOF10: 8

## 2024-08-20 ASSESSMENT — PAIN DESCRIPTION - LOCATION
LOCATION: CHEST
LOCATION: CHEST

## 2024-08-20 ASSESSMENT — PAIN DESCRIPTION - DESCRIPTORS: DESCRIPTORS: TIGHTNESS

## 2024-08-20 ASSESSMENT — LIFESTYLE VARIABLES
HOW OFTEN DO YOU HAVE A DRINK CONTAINING ALCOHOL: NEVER
HOW MANY STANDARD DRINKS CONTAINING ALCOHOL DO YOU HAVE ON A TYPICAL DAY: PATIENT DOES NOT DRINK

## 2024-08-20 ASSESSMENT — PAIN - FUNCTIONAL ASSESSMENT: PAIN_FUNCTIONAL_ASSESSMENT: 0-10

## 2024-08-20 NOTE — ED PROVIDER NOTES
HPI:  8/20/24,   Time: 6:08 PM EDT       Prabhakar Reynolds is a 54 y.o. male presenting to the ED for cp/sob after colonoscopy, beginning hr ago.  The complaint has been persistent, mild in severity, and worsened by nothing.  Brought in by EMS.  Had outpatient colonoscopy today.  Awoke from procedure complain nausea vomiting.  Also chest pain shortness of breath.  No abdominal pain.  No diarrhea.  No fevers chills or sweats    Review of Systems:   Pertinent positives and negatives are stated within HPI, all other systems reviewed and are negative.          --------------------------------------------- PAST HISTORY ---------------------------------------------  Past Medical History:  has a past medical history of Basal cell carcinoma of skin, Cancer (HCC), Depression, Gastroesophageal reflux disease without esophagitis, Hypertension, and Severe episode of recurrent major depressive disorder, without psychotic features (HCC).    Past Surgical History:  has a past surgical history that includes Brain tumor excision (01/01/1972); Appendectomy (1985); Mandible surgery (1998); other surgical history (Right, 06/11/2014); Colonoscopy (2008); and Esophagogastroduodenoscopy (2010).    Social History:  reports that he quit smoking about 16 years ago. His smoking use included cigarettes. He started smoking about 40 years ago. He has a 12 pack-year smoking history. He has been exposed to tobacco smoke. He has never used smokeless tobacco. He reports that he does not drink alcohol and does not use drugs.    Family History: family history includes Alcohol Abuse in his father; Cancer in his father; Stroke in his mother.     The patient’s home medications have been reviewed.    Allergies: Patient has no known allergies.        ---------------------------------------------------PHYSICAL EXAM--------------------------------------    Constitutional/General: Alert and oriented x3, well appearing, non toxic in NAD  Head: Normocephalic and  atraumatic  Eyes: PERRL, EOMI, conjunctive normal, sclera non icteric  Mouth: Oropharynx clear, handling secretions, no trismus, no asymmetry of the posterior oropharynx or uvular edema  Neck: Supple, full ROM, non tender to palpation in the midline, no stridor, no crepitus, no meningeal signs  Respiratory: . Not in respiratory distress  Cardiovascular:  Regular rate. Regular rhythm. . 2+ distal pulses  Chest: No chest wall tenderness  GI:  Abdomen Soft, Non tender, Non distended. .   Musculoskeletal: Moves all extremities x 4. Warm and well perfused,   Integument: skin warm and dry. No rashes.   Lymphatic: no lymphadenopathy noted  Neurologic: GCS 15, no focal deficits, symmetric strength 5/5 in the upper and lower extremities bilaterally  Psychiatric: Normal Affect      Medical Decision Making:    Postop with reported hypoxia and shortness of breath and chest pain.  Nausea vomiting.  Consideration for aspiration/pneumonia/COVID/electro imbalance anemia/ALE/PE.  No PE.  Does have multifocal pneumonitis/aspiration/pneumonia.  PaO2 less than 60 on ABG.  Will admit for further care      -------------------------------------------------- RESULTS -------------------------------------------------  I have personally reviewed all laboratory and imaging results for this patient. Results are listed below.     LABS:  Results for orders placed or performed during the hospital encounter of 08/20/24   COVID-19, Rapid    Specimen: Nasopharyngeal Swab   Result Value Ref Range    Specimen Description .NASOPHARYNGEAL SWAB     SARS-CoV-2, Rapid Not Detected Not Detected   CMP   Result Value Ref Range    Sodium 140 132 - 146 mmol/L    Potassium 4.5 3.5 - 5.0 mmol/L    Chloride 99 98 - 107 mmol/L    CO2 27 22 - 29 mmol/L    Anion Gap 14 7 - 16 mmol/L    Glucose 89 74 - 99 mg/dL    BUN 17 6 - 20 mg/dL    Creatinine 1.3 (H) 0.70 - 1.20 mg/dL    Est, Glom Filt Rate 67 >60 mL/min/1.73m2    Calcium 9.9 8.6 - 10.2 mg/dL    Total Protein 7.8

## 2024-08-21 LAB
ALBUMIN SERPL-MCNC: 3.9 G/DL (ref 3.5–5.2)
ALP SERPL-CCNC: 84 U/L (ref 40–129)
ALT SERPL-CCNC: 14 U/L (ref 0–40)
ANION GAP SERPL CALCULATED.3IONS-SCNC: 14 MMOL/L (ref 7–16)
AST SERPL-CCNC: 15 U/L (ref 0–39)
BASOPHILS # BLD: 0.03 K/UL (ref 0–0.2)
BASOPHILS NFR BLD: 0 % (ref 0–2)
BILIRUB SERPL-MCNC: 1.7 MG/DL (ref 0–1.2)
BUN SERPL-MCNC: 20 MG/DL (ref 6–20)
CALCIUM SERPL-MCNC: 8.6 MG/DL (ref 8.6–10.2)
CHLORIDE SERPL-SCNC: 102 MMOL/L (ref 98–107)
CO2 SERPL-SCNC: 24 MMOL/L (ref 22–29)
CREAT SERPL-MCNC: 1 MG/DL (ref 0.7–1.2)
EOSINOPHIL # BLD: 0.02 K/UL (ref 0.05–0.5)
EOSINOPHILS RELATIVE PERCENT: 0 % (ref 0–6)
ERYTHROCYTE [DISTWIDTH] IN BLOOD BY AUTOMATED COUNT: 13.1 % (ref 11.5–15)
GFR, ESTIMATED: 88 ML/MIN/1.73M2
GLUCOSE BLD-MCNC: 107 MG/DL (ref 74–99)
GLUCOSE SERPL-MCNC: 107 MG/DL (ref 74–99)
HCT VFR BLD AUTO: 43.7 % (ref 37–54)
HGB BLD-MCNC: 14.4 G/DL (ref 12.5–16.5)
IMM GRANULOCYTES # BLD AUTO: 0.03 K/UL (ref 0–0.58)
IMM GRANULOCYTES NFR BLD: 0 % (ref 0–5)
LYMPHOCYTES NFR BLD: 0.95 K/UL (ref 1.5–4)
LYMPHOCYTES RELATIVE PERCENT: 7 % (ref 20–42)
MAGNESIUM SERPL-MCNC: 1.8 MG/DL (ref 1.6–2.6)
MCH RBC QN AUTO: 28.2 PG (ref 26–35)
MCHC RBC AUTO-ENTMCNC: 33 G/DL (ref 32–34.5)
MCV RBC AUTO: 85.5 FL (ref 80–99.9)
MONOCYTES NFR BLD: 0.9 K/UL (ref 0.1–0.95)
MONOCYTES NFR BLD: 7 % (ref 2–12)
NEUTROPHILS NFR BLD: 85 % (ref 43–80)
NEUTS SEG NFR BLD: 10.87 K/UL (ref 1.8–7.3)
PLATELET # BLD AUTO: 231 K/UL (ref 130–450)
PMV BLD AUTO: 9.8 FL (ref 7–12)
POTASSIUM SERPL-SCNC: 4.8 MMOL/L (ref 3.5–5)
PROCALCITONIN SERPL-MCNC: 0.68 NG/ML (ref 0–0.08)
PROT SERPL-MCNC: 6.6 G/DL (ref 6.4–8.3)
RBC # BLD AUTO: 5.11 M/UL (ref 3.8–5.8)
SODIUM SERPL-SCNC: 140 MMOL/L (ref 132–146)
WBC OTHER # BLD: 12.8 K/UL (ref 4.5–11.5)

## 2024-08-21 PROCEDURE — 6370000000 HC RX 637 (ALT 250 FOR IP)

## 2024-08-21 PROCEDURE — 2500000003 HC RX 250 WO HCPCS: Performed by: EMERGENCY MEDICINE

## 2024-08-21 PROCEDURE — 82436 ASSAY OF URINE CHLORIDE: CPT

## 2024-08-21 PROCEDURE — 87040 BLOOD CULTURE FOR BACTERIA: CPT

## 2024-08-21 PROCEDURE — 82962 GLUCOSE BLOOD TEST: CPT

## 2024-08-21 PROCEDURE — 2500000003 HC RX 250 WO HCPCS

## 2024-08-21 PROCEDURE — 84145 PROCALCITONIN (PCT): CPT

## 2024-08-21 PROCEDURE — 85025 COMPLETE CBC W/AUTO DIFF WBC: CPT

## 2024-08-21 PROCEDURE — 36415 COLL VENOUS BLD VENIPUNCTURE: CPT

## 2024-08-21 PROCEDURE — 92610 EVALUATE SWALLOWING FUNCTION: CPT

## 2024-08-21 PROCEDURE — 94640 AIRWAY INHALATION TREATMENT: CPT

## 2024-08-21 PROCEDURE — 97161 PT EVAL LOW COMPLEX 20 MIN: CPT

## 2024-08-21 PROCEDURE — 80053 COMPREHEN METABOLIC PANEL: CPT

## 2024-08-21 PROCEDURE — 2700000000 HC OXYGEN THERAPY PER DAY

## 2024-08-21 PROCEDURE — 6370000000 HC RX 637 (ALT 250 FOR IP): Performed by: INTERNAL MEDICINE

## 2024-08-21 PROCEDURE — 99223 1ST HOSP IP/OBS HIGH 75: CPT | Performed by: INTERNAL MEDICINE

## 2024-08-21 PROCEDURE — 6360000002 HC RX W HCPCS

## 2024-08-21 PROCEDURE — 83735 ASSAY OF MAGNESIUM: CPT

## 2024-08-21 PROCEDURE — 84300 ASSAY OF URINE SODIUM: CPT

## 2024-08-21 PROCEDURE — 97530 THERAPEUTIC ACTIVITIES: CPT

## 2024-08-21 PROCEDURE — 84540 ASSAY OF URINE/UREA-N: CPT

## 2024-08-21 PROCEDURE — 2580000003 HC RX 258: Performed by: EMERGENCY MEDICINE

## 2024-08-21 PROCEDURE — 97165 OT EVAL LOW COMPLEX 30 MIN: CPT

## 2024-08-21 PROCEDURE — 1200000000 HC SEMI PRIVATE

## 2024-08-21 PROCEDURE — 97535 SELF CARE MNGMENT TRAINING: CPT

## 2024-08-21 PROCEDURE — 2580000003 HC RX 258

## 2024-08-21 RX ORDER — THIAMINE HYDROCHLORIDE 100 MG/ML
100 INJECTION, SOLUTION INTRAMUSCULAR; INTRAVENOUS DAILY
Status: DISCONTINUED | OUTPATIENT
Start: 2024-08-21 | End: 2024-08-22 | Stop reason: HOSPADM

## 2024-08-21 RX ORDER — SODIUM CHLORIDE 0.9 % (FLUSH) 0.9 %
5-40 SYRINGE (ML) INJECTION EVERY 12 HOURS SCHEDULED
Status: DISCONTINUED | OUTPATIENT
Start: 2024-08-21 | End: 2024-08-22 | Stop reason: HOSPADM

## 2024-08-21 RX ORDER — GLUCAGON 1 MG/ML
1 KIT INJECTION PRN
Status: DISCONTINUED | OUTPATIENT
Start: 2024-08-21 | End: 2024-08-22 | Stop reason: HOSPADM

## 2024-08-21 RX ORDER — OMEPRAZOLE 40 MG/1
40 CAPSULE, DELAYED RELEASE ORAL DAILY
COMMUNITY
Start: 2024-07-23

## 2024-08-21 RX ORDER — CALCIUM CARBONATE 500 MG/1
500 TABLET, CHEWABLE ORAL 3 TIMES DAILY PRN
Status: DISCONTINUED | OUTPATIENT
Start: 2024-08-21 | End: 2024-08-22 | Stop reason: HOSPADM

## 2024-08-21 RX ORDER — DEXTROSE MONOHYDRATE 50 MG/ML
INJECTION, SOLUTION INTRAVENOUS CONTINUOUS
Status: DISCONTINUED | OUTPATIENT
Start: 2024-08-21 | End: 2024-08-22 | Stop reason: HOSPADM

## 2024-08-21 RX ORDER — ACETAMINOPHEN 325 MG/1
650 TABLET ORAL EVERY 6 HOURS PRN
Status: DISCONTINUED | OUTPATIENT
Start: 2024-08-21 | End: 2024-08-22 | Stop reason: HOSPADM

## 2024-08-21 RX ORDER — IPRATROPIUM BROMIDE AND ALBUTEROL SULFATE 2.5; .5 MG/3ML; MG/3ML
1 SOLUTION RESPIRATORY (INHALATION)
Status: DISCONTINUED | OUTPATIENT
Start: 2024-08-21 | End: 2024-08-22 | Stop reason: HOSPADM

## 2024-08-21 RX ORDER — ONDANSETRON 2 MG/ML
4 INJECTION INTRAMUSCULAR; INTRAVENOUS EVERY 6 HOURS PRN
Status: DISCONTINUED | OUTPATIENT
Start: 2024-08-21 | End: 2024-08-22 | Stop reason: HOSPADM

## 2024-08-21 RX ORDER — PANTOPRAZOLE SODIUM 40 MG/1
40 TABLET, DELAYED RELEASE ORAL
Status: DISCONTINUED | OUTPATIENT
Start: 2024-08-22 | End: 2024-08-22 | Stop reason: HOSPADM

## 2024-08-21 RX ORDER — SODIUM CHLORIDE 0.9 % (FLUSH) 0.9 %
5-40 SYRINGE (ML) INJECTION PRN
Status: DISCONTINUED | OUTPATIENT
Start: 2024-08-21 | End: 2024-08-22 | Stop reason: HOSPADM

## 2024-08-21 RX ORDER — LANOLIN ALCOHOL/MO/W.PET/CERES
6 CREAM (GRAM) TOPICAL NIGHTLY PRN
Status: DISCONTINUED | OUTPATIENT
Start: 2024-08-21 | End: 2024-08-22 | Stop reason: HOSPADM

## 2024-08-21 RX ORDER — 0.9 % SODIUM CHLORIDE 0.9 %
500 INTRAVENOUS SOLUTION INTRAVENOUS ONCE
Status: COMPLETED | OUTPATIENT
Start: 2024-08-21 | End: 2024-08-21

## 2024-08-21 RX ORDER — AMLODIPINE BESYLATE 2.5 MG/1
2.5 TABLET ORAL DAILY
Status: ON HOLD | COMMUNITY
End: 2024-08-22 | Stop reason: HOSPADM

## 2024-08-21 RX ORDER — CAPSAICIN 0.025 %
CREAM (GRAM) TOPICAL 2 TIMES DAILY
Status: DISCONTINUED | OUTPATIENT
Start: 2024-08-21 | End: 2024-08-22 | Stop reason: HOSPADM

## 2024-08-21 RX ORDER — POLYETHYLENE GLYCOL 3350 17 G/17G
17 POWDER, FOR SOLUTION ORAL DAILY PRN
Status: DISCONTINUED | OUTPATIENT
Start: 2024-08-21 | End: 2024-08-22 | Stop reason: HOSPADM

## 2024-08-21 RX ORDER — ONDANSETRON 4 MG/1
4 TABLET, ORALLY DISINTEGRATING ORAL EVERY 8 HOURS PRN
Status: DISCONTINUED | OUTPATIENT
Start: 2024-08-21 | End: 2024-08-22 | Stop reason: HOSPADM

## 2024-08-21 RX ORDER — ENOXAPARIN SODIUM 100 MG/ML
40 INJECTION SUBCUTANEOUS DAILY
Status: DISCONTINUED | OUTPATIENT
Start: 2024-08-21 | End: 2024-08-21

## 2024-08-21 RX ORDER — ACETAMINOPHEN 650 MG/1
650 SUPPOSITORY RECTAL EVERY 6 HOURS PRN
Status: DISCONTINUED | OUTPATIENT
Start: 2024-08-21 | End: 2024-08-22 | Stop reason: HOSPADM

## 2024-08-21 RX ORDER — ONDANSETRON 2 MG/ML
4 INJECTION INTRAMUSCULAR; INTRAVENOUS EVERY 8 HOURS PRN
Status: DISCONTINUED | OUTPATIENT
Start: 2024-08-21 | End: 2024-08-21 | Stop reason: ALTCHOICE

## 2024-08-21 RX ORDER — HEPARIN SODIUM 5000 [USP'U]/ML
5000 INJECTION, SOLUTION INTRAVENOUS; SUBCUTANEOUS EVERY 8 HOURS
Status: DISCONTINUED | OUTPATIENT
Start: 2024-08-21 | End: 2024-08-22 | Stop reason: HOSPADM

## 2024-08-21 RX ORDER — SODIUM CHLORIDE 9 MG/ML
INJECTION, SOLUTION INTRAVENOUS PRN
Status: DISCONTINUED | OUTPATIENT
Start: 2024-08-21 | End: 2024-08-22 | Stop reason: HOSPADM

## 2024-08-21 RX ORDER — DEXTROSE MONOHYDRATE 100 MG/ML
INJECTION, SOLUTION INTRAVENOUS CONTINUOUS PRN
Status: DISCONTINUED | OUTPATIENT
Start: 2024-08-21 | End: 2024-08-22 | Stop reason: HOSPADM

## 2024-08-21 RX ADMIN — DOXYCYCLINE 100 MG: 100 INJECTION, POWDER, LYOPHILIZED, FOR SOLUTION INTRAVENOUS at 23:38

## 2024-08-21 RX ADMIN — DOXYCYCLINE 100 MG: 100 INJECTION, POWDER, LYOPHILIZED, FOR SOLUTION INTRAVENOUS at 11:49

## 2024-08-21 RX ADMIN — DEXTROSE MONOHYDRATE: 50 INJECTION, SOLUTION INTRAVENOUS at 01:35

## 2024-08-21 RX ADMIN — IPRATROPIUM BROMIDE AND ALBUTEROL SULFATE 1 DOSE: 2.5; .5 SOLUTION RESPIRATORY (INHALATION) at 20:09

## 2024-08-21 RX ADMIN — HEPARIN SODIUM 5000 UNITS: 5000 INJECTION INTRAVENOUS; SUBCUTANEOUS at 05:25

## 2024-08-21 RX ADMIN — SODIUM CHLORIDE, PRESERVATIVE FREE 10 ML: 5 INJECTION INTRAVENOUS at 20:52

## 2024-08-21 RX ADMIN — ACETAMINOPHEN 650 MG: 325 TABLET ORAL at 05:25

## 2024-08-21 RX ADMIN — CALCIUM CARBONATE 500 MG: 500 TABLET, CHEWABLE ORAL at 23:29

## 2024-08-21 RX ADMIN — CAPSAICIN: 0.25 CREAM TOPICAL at 23:29

## 2024-08-21 RX ADMIN — THIAMINE HYDROCHLORIDE 100 MG: 100 INJECTION, SOLUTION INTRAMUSCULAR; INTRAVENOUS at 09:18

## 2024-08-21 RX ADMIN — WATER 1000 MG: 1 INJECTION INTRAMUSCULAR; INTRAVENOUS; SUBCUTANEOUS at 23:32

## 2024-08-21 RX ADMIN — SODIUM CHLORIDE 500 ML: 9 INJECTION, SOLUTION INTRAVENOUS at 01:34

## 2024-08-21 RX ADMIN — HEPARIN SODIUM 5000 UNITS: 5000 INJECTION INTRAVENOUS; SUBCUTANEOUS at 20:57

## 2024-08-21 RX ADMIN — HEPARIN SODIUM 5000 UNITS: 5000 INJECTION INTRAVENOUS; SUBCUTANEOUS at 14:12

## 2024-08-21 RX ADMIN — DOXYCYCLINE 100 MG: 100 INJECTION, POWDER, LYOPHILIZED, FOR SOLUTION INTRAVENOUS at 00:23

## 2024-08-21 ASSESSMENT — PAIN SCALES - GENERAL
PAINLEVEL_OUTOF10: 3
PAINLEVEL_OUTOF10: 4
PAINLEVEL_OUTOF10: 0
PAINLEVEL_OUTOF10: 3

## 2024-08-21 ASSESSMENT — PAIN DESCRIPTION - LOCATION: LOCATION: BACK

## 2024-08-21 ASSESSMENT — PAIN DESCRIPTION - DESCRIPTORS: DESCRIPTORS: ACHING;TENDER;DULL

## 2024-08-21 ASSESSMENT — PAIN DESCRIPTION - ORIENTATION: ORIENTATION: LOWER;MID

## 2024-08-21 ASSESSMENT — PAIN - FUNCTIONAL ASSESSMENT: PAIN_FUNCTIONAL_ASSESSMENT: ACTIVITIES ARE NOT PREVENTED

## 2024-08-21 NOTE — CARE COORDINATION
8/21. Met with the pt at the bedside to discuss transition of care. The pt lives alone in a 6th floor apartment, with elevator access. He was brought to the ED after becoming hypoxic, SOB and experiencing chest pain, after a colonoscopy. pO2 was 56 and SpO2 88%. SpO2 this am was 98% on Rm air. Discharge plan is home when medically stable. Maricruz Devlin RN    Case Management Assessment  Initial Evaluation    Date/Time of Evaluation: 8/21/2024 2:21 PM  Assessment Completed by: Maricruz Devlin RN    If patient is discharged prior to next notation, then this note serves as note for discharge by case management.    Patient Name: Prabhakar Reynolds                   YOB: 1970  Diagnosis: Hypoxemia [R09.02]  Hypoxia [R09.02]  Pneumonia of right lung due to infectious organism, unspecified part of lung [J18.9]                   Date / Time: 8/20/2024  5:54 PM    Patient Admission Status: Inpatient   Readmission Risk (Low < 19, Mod (19-27), High > 27): Readmission Risk Score: 8.5    Current PCP: Sg Rodriguez MD  PCP verified by CM? Yes    Chart Reviewed: Yes      History Provided by: Patient  Patient Orientation: Alert and Oriented    Patient Cognition: Alert    Hospitalization in the last 30 days (Readmission):  No    If yes, Readmission Assessment in  Navigator will be completed.    Advance Directives:      Code Status: Full Code   Patient's Primary Decision Maker is: Legal Next of Kin      Discharge Planning:    Patient lives with: Parent (Mother) Type of Home: Apartment  Primary Care Giver: Self  Patient Support Systems include: Parent   Current Financial resources:    Current community resources:    Current services prior to admission: None            Current DME:              Type of Home Care services:  None    ADLS  Prior functional level: Independent in ADLs/IADLs  Current functional level: Independent in ADLs/IADLs    PT AM-PAC: 16 /24  OT AM-PAC: 19 /24    Family can provide assistance at DC:

## 2024-08-21 NOTE — PROGRESS NOTES
SPEECH/LANGUAGE PATHOLOGY  CLINICAL ASSESSMENT OF SWALLOWING FUNCTION   and PLAN OF CARE    PATIENT NAME:  Prabhakar Reynolds  (male)     MRN:  [id number]    :  1970  (54 y.o.)  STATUS:  Inpatient: Room 5411/5411-A    TODAY'S DATE:  2024  REFERRING PROVIDER:   Dr. Luis Daniel Esparza  SPECIFIC PROVIDER ORDER: SLP swallowing-dysphagia evaluation and treatment Date of order:  24  REASON FOR REFERRAL: aspiration   EVALUATING THERAPIST: CHIQUIS Waters                 RESULTS:    DYSPHAGIA DIAGNOSIS:   Clinical indicators of mild  oropharyngeal phase dysphagia       DIET RECOMMENDATIONS:  Minced and moist consistency solids (IDDSI level 5) with  nectar consistency (mildly thick - IDDSI level 2) liquids     FEEDING RECOMMENDATIONS:     Assistance level:  No assistance needed      Compensatory strategies recommended:   Upright in bed/ chair as tolerated  SINGLE cup sips  SMALL bites  NO STRAW      Discussed recommendations with:  patient nurse in person    SPEECH THERAPY  PLAN OF CARE   The dysphagia POC is established based on physician order, dysphagia diagnosis and results of clinical assessment     Skilled SLP intervention for dysphagia management up to 6 x per week until goals met, pt plateaus in function and/or discharged from Acute Rehab    Conditions Requiring Skilled Therapeutic Intervention for dysphagia:    impaired mastication   Coughing during PO intake      Specific dysphagia interventions to include:     MBSS to fully assess oropharyngeal swallow function and to assist in determining the least restrictive PO diet to maintain adequate nutrition/hydration   compensatory swallowing strategies to improve airway protection and swallow function.  PO trials of upgraded diet textures with SLP only to determine the least restrictive PO diet   oral motor strength/ coordination exercises to improve bolus prep/ control and mastication  exercises to target laryngeal elevation     Specific instructions for  next treatment:  MBSS to be completed    Patient Treatment Goals:    Short Term Goals:  Pt will participate in MBSS to fully assess oropharyngeal swallow function and to assist in determining the least restrictive PO diet to maintain adequate nutrition/hydration   During trials of solids patient will masticate solids fully and recollect bolus leaving only minimal oral residue post swallow on  90% of opportunities or greater  Pt will improve bolus prep/control and mastication with  minimal verbal prompts to advance diet grade by 1 IDDSI level .  Pt will decrease clinical indicators of airway compromise to 2 or less during swallowing of 8 ounces of liquid  minimal verbal prompts    Long Term Goals:  Pt will improve oropharyngeal swallow function to ensure airway protection during PO intake to maintain adequate nutrition/hydration and decrease signs/symptoms of aspiration to less than 1 x/day.      Patient/family Goal:    Did not state.  Will further assess during treatment.    Plan of care discussed with Patient   The Patient understand(s) the diagnosis, prognosis and plan of care     Rehabilitation Potential/Prognosis: good                    ADMITTING DIAGNOSIS: Hypoxemia [R09.02]  Hypoxia [R09.02]  Pneumonia of right lung due to infectious organism, unspecified part of lung [J18.9]    VISIT DIAGNOSIS:   Visit Diagnoses         Codes    Pneumonia of right lung due to infectious organism, unspecified part of lung    -  Primary J18.9    Hypoxemia     R09.02             PATIENT REPORT/COMPLAINT: occasional cough  RN cleared patient for participation in assessment     yes     PRIOR LEVEL OF SWALLOW FUNCTION:    PAST HISTORY OF OROPHARYNGEAL DYSPHAGIA?: none reported    Home diet: Easy to chew consistency solids (IDDSI level 7, transitional) with thin liquids (IDDSI level 0)  Current Diet Order:  Diet NPO    PROCEDURE:  Consistencies Administered During the Evaluation   Liquids: thin liquid and nectar thick  recurrent episode, severe with mixed features (HCC)    Basal cell carcinoma (BCC) of scalp    Hyperlipemia    Class 1 drug-induced obesity in adult    Essential hypertension    Benign prostatic hyperplasia with urinary frequency    Cognitive disorder    Systolic ejection murmur    Imbalance    Meningioma (HCC)    Major depression, single episode    Hypoxia         CPT code:  51656  bedside swallow cathleen Mir M.S., CCC-SLP/L  Speech-Language Pathologist

## 2024-08-21 NOTE — PLAN OF CARE
Problem: Discharge Planning  Goal: Discharge to home or other facility with appropriate resources  Outcome: Progressing     Problem: Pain  Goal: Verbalizes/displays adequate comfort level or baseline comfort level  Outcome: Progressing     Problem: Safety - Adult  Goal: Free from fall injury  Outcome: Progressing     Problem: Neurosensory - Adult  Goal: Achieves stable or improved neurological status  Outcome: Progressing  Goal: Achieves maximal functionality and self care  Outcome: Progressing     Problem: Respiratory - Adult  Goal: Achieves optimal ventilation and oxygenation  Outcome: Progressing     Problem: Skin/Tissue Integrity - Adult  Goal: Skin integrity remains intact  Outcome: Progressing  Goal: Incisions, wounds, or drain sites healing without S/S of infection  Outcome: Progressing     Problem: Musculoskeletal - Adult  Goal: Return mobility to safest level of function  Outcome: Progressing  Goal: Maintain proper alignment of affected body part  Outcome: Progressing  Goal: Return ADL status to a safe level of function  Outcome: Progressing     Problem: Gastrointestinal - Adult  Goal: Minimal or absence of nausea and vomiting  Outcome: Progressing     Problem: Infection - Adult  Goal: Absence of infection at discharge  Outcome: Progressing  Goal: Absence of infection during hospitalization  Outcome: Progressing
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
## 2024-08-21 NOTE — PROGRESS NOTES
OCCUPATIONAL THERAPY INITIAL EVALUATION    [facility]  [address] [place], [place]        Date:2024                                                  Patient Name: Prabhakar Reynolds    MRN: [id number]    : 1970    Room: [address] [place] [address]      Evaluating OT: Cleo Tim OTR/L; [id number]       Referring Provider: Luis Daniel Esparza MD     Specific Provider Orders/Date: OT Eval and Treat 24 0400       Diagnosis: Hypoxia s/p outpatient colonoscopy.     Surgery: None this admission     Pertinent Medical History:  has a past medical history of Basal cell carcinoma of skin, Cancer (HCC), Depression, Gastroesophageal reflux disease without esophagitis, Hypertension, and Severe episode of recurrent major depressive disorder, without psychotic features (HCC).    Recommended Adaptive Equipment: TBD     Precautions:  Fall Risk, NPO     Assessment of current deficits    [x] Functional mobility  [x]ADLs  [x] Strength               []Cognition    [x] Functional transfers   [x] IADLs         [x] Safety Awareness   [x]Endurance    [] Fine Coordination              [x] Balance      [] Vision/perception   []Sensation     []Gross Motor Coordination  [] ROM  [] Delirium                   [] Motor Control     OT PLAN OF CARE   OT POC based on physician orders, patient diagnosis and results of clinical assessment    Frequency/Duration 1-3 days/wk for 2 weeks PRN   Specific OT Treatment Interventions to include:   * Instruction/training on adapted ADL techniques and AE recommendations to increase functional independence within precautions       * Training on energy conservation strategies, correct breathing pattern and techniques to improve independence/tolerance for self-care routine  * Functional transfer/mobility training/DME recommendations for increased independence, safety, and fall prevention  * Patient/Family education to increase follow through with  St[place]   Stand to sit: Modified Stanton   Stand pivot:  Modified Stanton   Commode:  Modified [place]    Functional Mobility Minimal Assist no use of AD; Stand by Assist w/ ww   within household distance  Modified Stanton w/ use of Appropriate AD PRN   Balance Sitting:     Static - Supervision      Dynamic - Supervision   Standing: Stand by Assist w/ ww; Minimal Assist w/o AD  Sitting:     Static:  Independent      Dynamic:  Independent   Standing:  Modified Stanton    Activity Tolerance Fair tolerance w/ light activity  Good tolerance w/ light to mod activity   Visual/  Perceptual Glasses: Yes  WFL     Safety Fair+  Good  during ADL completion   Vitals SpO2 97%, 72 HR on 3L O2 at rest beginning of session.   SpO2 96%, 86 HR on RA at rest.   SpO2 90-91%, 100 HR on RA throughout OOB activity.       Hand Dominance Left   AROM (PROM) Strength Additional Info:  Goal: (PRN)   RUE  WFL 4/5 grossly tested good  and wfl FMC/dexterity noted during ADL tasks   Improve overall RUE strength WFL for participation in functional tasks       LUE WFL 4/5 grossly tested Good  and wfl FMC/dexterity noted during ADL tasks   Improve overall LUE strength WFL for participation in functional tasks       Hearing: WFL  Sensation: No c/o numbness or tingling  Tone: WFL  Edema: Unremarkable    Comment: Cleared by RN to see pt. Upon arrival patient supine in bed and agreeable to OT session. At end of session, patient seated in bedside chair with call light and phone within reach, all lines and tubes intact.  Overall patient demonstrated decreased independence and safety during completion of ADL/functional transfer/mobility tasks. Therapist facilitated ADL tasks, functional transfers, functional mobility, bed mobility to address safety awareness, implementation of fall prevention strategies, & functional engagement throughout daily activities. Pt would benefit from continued skilled OT to increase safety

## 2024-08-21 NOTE — PROGRESS NOTES
Physical Therapy  Physical Therapy Initial Assessment    Name: Prabhakar Reynolds  : 1970  MRN: [id number]      Date of Service: 2024    Evaluating PT:  Mau Ramirez, PT, DPT [id number]    Room #:  5411/5411-A  Diagnosis:  Hypoxemia [R09.02]  Hypoxia [R09.02]  Pneumonia of right lung due to infectious organism, unspecified part of lung [J18.9]  PMHx/PSHx:   has a past medical history of Basal cell carcinoma of skin, Cancer (HCC), Depression, Gastroesophageal reflux disease without esophagitis, Hypertension, and Severe episode of recurrent major depressive disorder, without psychotic features (HCC).   has a past surgical history that includes Brain tumor excision (1972); Appendectomy (); Mandible surgery (); other surgical history (Right, 2014); Colonoscopy (); and Esophagogastroduodenoscopy ().  Procedure/Surgery:  None  Precautions:  Falls  Equipment Needs:  FWW    SUBJECTIVE:    Pt lives alone in a 6th floor apartment with level entry into building and elevator access to apartment.  Pt ambulated with no AD prior to admission.    OBJECTIVE:   Initial Evaluation  Date: 2024 Treatment Short Term/ Long Term   Goals   AM-PAC 6 Clicks      Was pt agreeable to Eval/treatment? Yes     Does pt have pain? Mild abdomen     Bed Mobility  Rolling: NT  Supine to sit: Supervision  Sit to supine: NT  Scooting: NT  Rolling: Independent  Supine to sit: Independent  Sit to supine: Independent  Scooting: Independent   Transfers Sit to stand: SBA  Stand to sit: SBA  Stand pivot: NT  Sit to stand: Mod I  Stand to sit: Mod I  Stand pivot: Mod I with AAD   Ambulation    230 feet with no AD Cierra    170 feet with WW SBA  500 feet with AAD Mod I   Stair negotiation: ascended and descended  NT  TBD   ROM BUE:  See OT note  BLE:  WFL     Strength BUE:  See OT note  BLE:  WFL     Balance Sitting EOB:  SBA  Dynamic Standing:  Cierra with no AD; SBA with WW  Sitting EOB:  Independent  Dynamic  0400  PT eval and treat  Start:  08/21/24 0400,   End:  08/21/24 0400,   ONE TIME,   Standing Count:  1 Occurrences,   R         Luis Daniel Esparza MD     Diagnosis:  Hypoxemia [R09.02]  Hypoxia [R09.02]  Pneumonia of right lung due to infectious organism, unspecified part of lung [J18.9]  Specific instructions for next treatment:  Continue to facilitate safe performance of functional mobility; progress as appropriate.    Current Treatment Recommendations:     [x] Strengthening to improve independence with functional mobility   [x] ROM to improve independence with functional mobility   [x] Balance Training to improve static/dynamic balance and to reduce fall risk  [x] Endurance Training to improve activity tolerance during functional mobility   [x] Transfer Training to improve safety and independence with all functional transfers   [x] Gait Training to improve gait mechanics, endurance and assess need for appropriate assistive device  [x] Stair Training in preparation for safe discharge home and/or into the community   [x] Positioning to prevent skin breakdown and contractures  [x] Safety and Education Training   [x] Patient/Caregiver Education   [] HEP  [] Other     PT long term treatment goals are located in above grid    Frequency of treatments: 2-5x/week x 1-2 weeks.    Time in  0953  Time out  1013    Total Treatment Time  10 minutes     Evaluation Time includes thorough review of current medical information, gathering information on past medical history/social history and prior level of function, completion of standardized testing/informal observation of tasks, assessment of data and education on plan of care and goals.    CPT codes:  [x] Low Complexity PT evaluation 07890  [] Moderate Complexity PT evaluation 69721  [] High Complexity PT evaluation 28941  [] PT Re-evaluation 32842  [] Gait training 03874 0 minutes  [] Manual therapy 58625 0 minutes  [x] Therapeutic activities 24970 10 minutes  [] Therapeutic

## 2024-08-21 NOTE — PROGRESS NOTES
4 Eyes Skin Assessment     NAME:  Prabhakar Reynolds  YOB: 1970  MEDICAL RECORD NUMBER:  [id number]    The patient is being assessed for  Admission    I agree that at least one RN has performed a thorough Head to Toe Skin Assessment on the patient. ALL assessment sites listed below have been assessed.      Areas assessed by both nurses:    Head, Face, Ears, Shoulders, Back, Chest, Arms, Elbows, Hands, Sacrum. Buttock, Coccyx, Ischium, Legs. Feet and Heels, and Under Medical Devices         Does the Patient have a Wound? No noted wound(s)       Anupam Prevention initiated by RN: Yes  Wound Care Orders initiated by RN: No    Pressure Injury (Stage 3,4, Unstageable, DTI, NWPT, and Complex wounds) if present, place Wound referral order by RN under : No    New Ostomies, if present place, Ostomy referral order under : No     Nurse 1 eSignature: Electronically signed by Sylvia Benavides RN on 8/21/24 at 1:36 AM EDT    **SHARE this note so that the co-signing nurse can place an eSignature**    Nurse 2 eSignature: Electronically signed by Yinka Rebolledo RN on 8/21/24 at 2:18 AM EDT

## 2024-08-21 NOTE — PROGRESS NOTES
Speech Language Pathology  NAME:  Prabhakar Reynolds  :  1970  DATE: 2024  ROOM:  [address] [place] [address]-A    Pt unavailable at 0720 for Clinical Swallow Evaluation       REASON:  With other medical staff     Will re-attempt as appropriate.       Thank You

## 2024-08-22 ENCOUNTER — APPOINTMENT (OUTPATIENT)
Dept: GENERAL RADIOLOGY | Age: 54
End: 2024-08-22
Payer: MEDICAID

## 2024-08-22 VITALS
WEIGHT: 160 LBS | DIASTOLIC BLOOD PRESSURE: 78 MMHG | TEMPERATURE: 97.2 F | BODY MASS INDEX: 31.41 KG/M2 | SYSTOLIC BLOOD PRESSURE: 130 MMHG | HEIGHT: 60 IN | RESPIRATION RATE: 16 BRPM | HEART RATE: 88 BPM | OXYGEN SATURATION: 95 %

## 2024-08-22 LAB
ALBUMIN SERPL-MCNC: 3.4 G/DL (ref 3.5–5.2)
ALP SERPL-CCNC: 78 U/L (ref 40–129)
ALT SERPL-CCNC: 11 U/L (ref 0–40)
ANION GAP SERPL CALCULATED.3IONS-SCNC: 9 MMOL/L (ref 7–16)
AST SERPL-CCNC: 15 U/L (ref 0–39)
BASOPHILS # BLD: 0.05 K/UL (ref 0–0.2)
BASOPHILS NFR BLD: 1 % (ref 0–2)
BILIRUB SERPL-MCNC: 1 MG/DL (ref 0–1.2)
BUN SERPL-MCNC: 11 MG/DL (ref 6–20)
CALCIUM SERPL-MCNC: 8.9 MG/DL (ref 8.6–10.2)
CHLORIDE SERPL-SCNC: 106 MMOL/L (ref 98–107)
CO2 SERPL-SCNC: 27 MMOL/L (ref 22–29)
CREAT SERPL-MCNC: 0.9 MG/DL (ref 0.7–1.2)
EKG ATRIAL RATE: 89 BPM
EKG P AXIS: 73 DEGREES
EKG P-R INTERVAL: 162 MS
EKG Q-T INTERVAL: 386 MS
EKG QRS DURATION: 80 MS
EKG QTC CALCULATION (BAZETT): 469 MS
EKG R AXIS: 57 DEGREES
EKG T AXIS: 42 DEGREES
EKG VENTRICULAR RATE: 89 BPM
EOSINOPHIL # BLD: 0.52 K/UL (ref 0.05–0.5)
EOSINOPHILS RELATIVE PERCENT: 8 % (ref 0–6)
ERYTHROCYTE [DISTWIDTH] IN BLOOD BY AUTOMATED COUNT: 13 % (ref 11.5–15)
GFR, ESTIMATED: >90 ML/MIN/1.73M2
GLUCOSE SERPL-MCNC: 116 MG/DL (ref 74–99)
HCT VFR BLD AUTO: 39.6 % (ref 37–54)
HGB BLD-MCNC: 13.5 G/DL (ref 12.5–16.5)
IMM GRANULOCYTES # BLD AUTO: <0.03 K/UL (ref 0–0.58)
IMM GRANULOCYTES NFR BLD: 0 % (ref 0–5)
LYMPHOCYTES NFR BLD: 1.61 K/UL (ref 1.5–4)
LYMPHOCYTES RELATIVE PERCENT: 23 % (ref 20–42)
MAGNESIUM SERPL-MCNC: 1.8 MG/DL (ref 1.6–2.6)
MCH RBC QN AUTO: 29.5 PG (ref 26–35)
MCHC RBC AUTO-ENTMCNC: 34.1 G/DL (ref 32–34.5)
MCV RBC AUTO: 86.7 FL (ref 80–99.9)
MONOCYTES NFR BLD: 0.56 K/UL (ref 0.1–0.95)
MONOCYTES NFR BLD: 8 % (ref 2–12)
NEUTROPHILS NFR BLD: 60 % (ref 43–80)
NEUTS SEG NFR BLD: 4.11 K/UL (ref 1.8–7.3)
PLATELET # BLD AUTO: 206 K/UL (ref 130–450)
PMV BLD AUTO: 10.1 FL (ref 7–12)
POTASSIUM SERPL-SCNC: 4.3 MMOL/L (ref 3.5–5)
PROT SERPL-MCNC: 6.1 G/DL (ref 6.4–8.3)
RBC # BLD AUTO: 4.57 M/UL (ref 3.8–5.8)
SODIUM SERPL-SCNC: 142 MMOL/L (ref 132–146)
WBC OTHER # BLD: 6.9 K/UL (ref 4.5–11.5)

## 2024-08-22 PROCEDURE — 6370000000 HC RX 637 (ALT 250 FOR IP): Performed by: INTERNAL MEDICINE

## 2024-08-22 PROCEDURE — 93010 ELECTROCARDIOGRAM REPORT: CPT | Performed by: INTERNAL MEDICINE

## 2024-08-22 PROCEDURE — 80053 COMPREHEN METABOLIC PANEL: CPT

## 2024-08-22 PROCEDURE — 6370000000 HC RX 637 (ALT 250 FOR IP)

## 2024-08-22 PROCEDURE — 94669 MECHANICAL CHEST WALL OSCILL: CPT

## 2024-08-22 PROCEDURE — 6360000002 HC RX W HCPCS

## 2024-08-22 PROCEDURE — 36415 COLL VENOUS BLD VENIPUNCTURE: CPT

## 2024-08-22 PROCEDURE — 85025 COMPLETE CBC W/AUTO DIFF WBC: CPT

## 2024-08-22 PROCEDURE — 83735 ASSAY OF MAGNESIUM: CPT

## 2024-08-22 PROCEDURE — 2500000003 HC RX 250 WO HCPCS

## 2024-08-22 PROCEDURE — 99239 HOSP IP/OBS DSCHRG MGMT >30: CPT | Performed by: INTERNAL MEDICINE

## 2024-08-22 PROCEDURE — 71045 X-RAY EXAM CHEST 1 VIEW: CPT

## 2024-08-22 PROCEDURE — 94640 AIRWAY INHALATION TREATMENT: CPT

## 2024-08-22 PROCEDURE — 2580000003 HC RX 258

## 2024-08-22 RX ORDER — ALBUTEROL SULFATE 90 UG/1
2 AEROSOL, METERED RESPIRATORY (INHALATION) EVERY 6 HOURS PRN
Qty: 18 G | Refills: 3 | Status: SHIPPED | OUTPATIENT
Start: 2024-08-22

## 2024-08-22 RX ORDER — IPRATROPIUM BROMIDE AND ALBUTEROL SULFATE 2.5; .5 MG/3ML; MG/3ML
3 SOLUTION RESPIRATORY (INHALATION)
Qty: 360 ML | Refills: 3 | Status: SHIPPED | OUTPATIENT
Start: 2024-08-22 | End: 2024-08-22 | Stop reason: HOSPADM

## 2024-08-22 RX ADMIN — IPRATROPIUM BROMIDE AND ALBUTEROL SULFATE 1 DOSE: 2.5; .5 SOLUTION RESPIRATORY (INHALATION) at 12:29

## 2024-08-22 RX ADMIN — SODIUM CHLORIDE, PRESERVATIVE FREE 10 ML: 5 INJECTION INTRAVENOUS at 08:58

## 2024-08-22 RX ADMIN — CAPSAICIN: 0.25 CREAM TOPICAL at 08:59

## 2024-08-22 RX ADMIN — PANTOPRAZOLE SODIUM 40 MG: 40 TABLET, DELAYED RELEASE ORAL at 05:41

## 2024-08-22 RX ADMIN — HEPARIN SODIUM 5000 UNITS: 5000 INJECTION INTRAVENOUS; SUBCUTANEOUS at 05:40

## 2024-08-22 RX ADMIN — THIAMINE HYDROCHLORIDE 100 MG: 100 INJECTION, SOLUTION INTRAMUSCULAR; INTRAVENOUS at 08:58

## 2024-08-22 RX ADMIN — IPRATROPIUM BROMIDE AND ALBUTEROL SULFATE 1 DOSE: 2.5; .5 SOLUTION RESPIRATORY (INHALATION) at 15:39

## 2024-08-22 RX ADMIN — DEXTROSE MONOHYDRATE: 50 INJECTION, SOLUTION INTRAVENOUS at 05:45

## 2024-08-22 RX ADMIN — IPRATROPIUM BROMIDE AND ALBUTEROL SULFATE 1 DOSE: 2.5; .5 SOLUTION RESPIRATORY (INHALATION) at 08:44

## 2024-08-22 RX ADMIN — DOXYCYCLINE 100 MG: 100 INJECTION, POWDER, LYOPHILIZED, FOR SOLUTION INTRAVENOUS at 12:04

## 2024-08-22 RX ADMIN — HEPARIN SODIUM 5000 UNITS: 5000 INJECTION INTRAVENOUS; SUBCUTANEOUS at 14:11

## 2024-08-22 ASSESSMENT — ENCOUNTER SYMPTOMS
APNEA: 0
EYE PAIN: 0
COUGH: 0
ABDOMINAL PAIN: 0
COLOR CHANGE: 0
RHINORRHEA: 0
EYE DISCHARGE: 0
ABDOMINAL DISTENTION: 0

## 2024-08-22 ASSESSMENT — PAIN SCALES - GENERAL
PAINLEVEL_OUTOF10: 0
PAINLEVEL_OUTOF10: 0

## 2024-08-22 NOTE — DISCHARGE SUMMARY
[facility]  Discharge Summary    PCP: Sg Rodriguez MD    Admit Date:8/20/2024  Discharge Date: 8/22/2024    Chief Complaint   Patient presents with    Emesis     Pt began vomiting during recovery after colonoscopy. Nurses stated pt Sp02 was 70%.  EMS arrival pt was axox4 and stating 100%         Admission Diagnosis:   Aspiration pneumonitis vs aspiration pneumonia  Leukocytosis likely secondary to infection versus reactive  Hyperbilirubinemia  History of MDD  Stable small 3 meningiomas on recent MRI brain on 8/1/2024  Encephalomalacia in the bilateral frontal lobes  Stable arachnoid cyst along the left lateral cerebellar hemisphere      Discharge Diagnosis:  Aspiration pneumonitis  History of MDD  Stable small 3 meningiomas on recent MRI brain on 8/1/2024  Encephalomalacia in the bilateral frontal lobes  Stable arachnoid cyst along the left lateral cerebellar hemisphere    Hospital Course:     The patient is a 54 y.o. male with history of major depressive disorder, presents to the ED with complaints of shortness of breath after after the colonoscopy procedure. After the procedure, patient was noted to be hypotensive and bradycardic likely vasovagal. He was noted to have emesis for which he was sent to the ED for management of possible aspiration pneumonia. He complained of cough. While evaluating in the ED patient was not complaining of any chest pains or chest tightness but said there was a mild discomfort. Initial laboratory in the ED revealed leukocytosis, hyperbilirubinemia and elevated creatinine. Initial CXR did not show findings suggestive of pneumonitis on his lateral mid left upper lobe lingula and left lower lobe. Procalcitonin was 0.68. He was started on Ceftriaxone and Doxycycline. He was also given Duoneb and PEP flutter.     On the next hospital day, patient feels improved overall. Leukocytosis was resolved. CXR did not show evidence of consolidation or  pneumonitis. Antibiotics were discontinued as this was likely a case of aspiration pneumonitis. Decision was made to discharge patient on Albuterol. He was stable on the day of discharge.      .  Review of Systems   Constitutional:  Negative for activity change and appetite change.   HENT:  Negative for congestion and rhinorrhea.    Eyes:  Negative for pain and discharge.   Respiratory:  Negative for apnea and cough.    Cardiovascular:  Negative for palpitations and leg swelling.   Gastrointestinal:  Negative for abdominal distention and abdominal pain.   Endocrine: Negative for polydipsia and polyuria.   Genitourinary:  Negative for difficulty urinating and enuresis.   Musculoskeletal:  Negative for arthralgias and gait problem.   Skin:  Negative for color change.   Allergic/Immunologic: Negative for environmental allergies and immunocompromised state.   Neurological:  Negative for dizziness.   Hematological:  Negative for adenopathy.   Psychiatric/Behavioral:  Negative for agitation and decreased concentration.           Significant findings (history and exam, laboratory, radiological, pathology, other tests):   General Appearance: alert, appears stated age, and cooperative  HEENT:  Head: Normal, normocephalic, atraumatic.  Neck: no adenopathy, no carotid bruit, no JVD, supple, symmetrical, trachea midline, and thyroid not enlarged, symmetric, no tenderness/mass/nodules  Lung: mild crackles on right lower lung base  Heart: regular rate and rhythm, S1, S2 normal, no murmur, click, rub or gallop  Abdomen: soft, non-tender; bowel sounds normal; no masses,  no organomegaly  Extremities:  extremities normal, atraumatic, no cyanosis or edema  Musculokeletal: No joint swelling, no muscle tenderness. ROM normal in all joints of extremities.   Neurologic: Mental status: Alert, oriented, thought content appropriate      Pending test results: none    Consults: none    Procedures:  none    Condition at discharge:  Stable    Disposition: home    Time taken for discharge : < 30 mins [] >30 mins []    Discharge Medications:  Current Discharge Medication List        START taking these medications    Details   albuterol sulfate HFA (PROVENTIL;VENTOLIN;PROAIR) 108 (90 Base) MCG/ACT inhaler Inhale 2 puffs into the lungs every 6 hours as needed for Wheezing  Qty: 18 g, Refills: 3           CONTINUE these medications which have NOT CHANGED    Details   omeprazole (PRILOSEC) 40 MG delayed release capsule Take 1 capsule by mouth daily      melatonin 3 MG TABS tablet Take 2 tablets by mouth nightly as needed (sleep)           STOP taking these medications       amLODIPine (NORVASC) 2.5 MG tablet Comments:   Reason for Stopping:         divalproex (DEPAKOTE) 250 MG DR tablet Comments:   Reason for Stopping:         divalproex (DEPAKOTE) 500 MG DR tablet Comments:   Reason for Stopping:         sertraline (ZOLOFT) 50 MG tablet Comments:   Reason for Stopping:         ibuprofen (ADVIL;MOTRIN) 600 MG tablet Comments:   Reason for Stopping:               Activity: activity as tolerated  Diet: regular diet    Follow ups  Please follow up with Internal Medicine Clinic on Aug 30, 8 am      Changes in healthcare   Condition on discharge: stable  Disposition: home  Diet: regular diet   Activity: activity as tolerated  Please take all medications as indicated  New Medications started during this hospital stay  Duoneb, inhale 3ml every 4 hrs  Changes to your medications  None  Medications you should stop taking   Amlodipine 2.5 mg   Depakote (Divalproex) 500 mg table  Sertraline (Zoloft) 50 mg tablet  Ibuprofen 600 mg tablet  Additional labs, testing or imaging needed after discharge   None  Please contact us if you have any concerns, wish to change or make an appointment:  Internal medicine clinic   Phone: [phone]  Fax: [phone] [address] [facility] [address]  Other than any new prescriptions given to you today, the list of home

## 2024-08-22 NOTE — DISCHARGE INSTRUCTIONS
Internal medicine    Follow ups  Please follow up with Internal Medicine Clinic on Aug 30, 8 am      Changes in healthcare   Condition on discharge: stable  Disposition: home  Diet: regular diet   Activity: activity as tolerated  Please take all medications as indicated  New Medications started during this hospital stay  Duoneb, inhale 3ml every 4 hrs  Changes to your medications  None  Medications you should stop taking   Amlodipine 2.5 mg   Depakote (Divalproex) 500 mg table  Sertraline (Zoloft) 50 mg tablet  Ibuprofen 600 mg tablet  Additional labs, testing or imaging needed after discharge   None  Please contact us if you have any concerns, wish to change or make an appointment:  Internal medicine clinic   Phone: [phone]  Fax: [phone]  [facility] [address]  Other than any new prescriptions given to you today, the list of home medications on this After Visit Summary are based on information provided to us from you and your healthcare providers. This information, including the list, dose, and frequency of medications is only as accurate as the information you provided. If you have any questions or concerns about your home medications, please contact your Primary Care Physician for further clarification.

## 2024-08-22 NOTE — PLAN OF CARE
Problem: Discharge Planning  Goal: Discharge to home or other facility with appropriate resources  Outcome: Progressing  Flowsheets (Taken 8/22/2024 0320)  Discharge to home or other facility with appropriate resources:   Identify barriers to discharge with patient and caregiver   Identify discharge learning needs (meds, wound care, etc)

## 2024-08-23 ENCOUNTER — TELEPHONE (OUTPATIENT)
Dept: INTERNAL MEDICINE | Age: 54
End: 2024-08-23

## 2024-08-23 NOTE — TELEPHONE ENCOUNTER
Last Appointment:  8/18/2023  Future Appointments   Date Time Provider Department Center   8/30/2024  8:00 AM Josr Rodríguez MD [facility]      No answer noted. VM message left asking to speak with Joseluis and to please return call to [phone].

## 2024-08-25 LAB
MICROORGANISM SPEC CULT: NORMAL
MICROORGANISM SPEC CULT: NORMAL
SERVICE CMNT-IMP: NORMAL
SERVICE CMNT-IMP: NORMAL
SPECIMEN DESCRIPTION: NORMAL
SPECIMEN DESCRIPTION: NORMAL

## 2024-09-30 LAB
CHLORIDE UR-SCNC: 58 MMOL/L
SODIUM UR-SCNC: 67 MMOL/L
UUN UR-MCNC: 825 MG/DL (ref 800–1666)

## 2024-11-23 NOTE — PROGRESS NOTES
Ht 1.524 m (5')   Wt 68 kg (150 lb)   SpO2 98%   BMI 29.29 kg/m²    Oxygen Saturation Interpretation: Normal.    Constitutional:  Alert, development consistent with age. NAD.  Head:  NC/NT. Airway patent.   Ears: TMs clear bilaterally. Canals without exudate or swelling bilaterally.  Mouth: Posterior pharynx with mild erythema and clear postnasal drip. There is no tonsillar hypertrophy or exudate.   Neck:  Normal ROM. Supple. There is no anterior cervical adenopathy noted.  Lungs: CTAB without wheezes, rales, or rhonchi.   CV:  Regular rate and rhythm, normal heart sounds, without pathological murmurs, ectopy, gallops, or rubs.  Skin:  Normal turgor.  Warm, dry, without visible rash.  Lymphatic: No lymphangitis or adenopathy noted.  Neurological:  Oriented.  Motor functions intact.    Lab / Imaging Results   (All laboratory and radiology results have been personally reviewed by myself)  Labs:  Results for orders placed or performed in visit on 01/08/24   POCT COVID-19, Antigen   Result Value Ref Range    SARS-COV-2, POC Detected (A) Not Detected    Lot Number [id number]     QC Pass/Fail Pass     Performing Instrument BD Veritor      Imaging:  All Radiology results interpreted by Radiologist unless otherwise noted.  No results found.    Medical Decision Making   Pt non-toxic, in no apparent distress and stable at time of discharge.     Assessment/Plan   Prabhakar was seen today for uri.    Diagnoses and all orders for this visit:    COVID-19    Cough, unspecified type  -     POCT COVID-19, Antigen    Rapid COVID-19 positive in office, pt advised of results. Pt advised of current CDC guidelines regarding isolation. Also advised current criteria to d/c isolation. Increase fluids and rest. Symptomatic relief discussed including Tylenol prn pain/fever. Schedule f/u with PCP in 7-10 days if symptoms persist. ED sooner if symptoms worsen or change. ED immediately with high or refractory fever, progressive SOB, dyspnea, CP,  [facility] DIVISION OF KIDNEY DISEASE AND HYPERTENSION  [phone]    Communication Note  Attending Attestation-- See full renal fellow consult note dated 11/22/2024 for attending attestation.    Continue bicarb gtt until tomorrow's labs resulted, will then reassess    Phos noted to be elevated, will start Renvela 800mg PO 3x/day  Iron studies noted, Tsat borderline.  Start ferrous sulfate 325mg PO daily.  Will also dose Epogen 10,000Units SQ x 1.    Please page the renal fellow on call if any additional input is need through the remainder of the day or overnight.

## 2024-11-29 DIAGNOSIS — M79.642 BILATERAL HAND PAIN: Primary | ICD-10-CM

## 2024-11-29 DIAGNOSIS — M79.641 BILATERAL HAND PAIN: Primary | ICD-10-CM

## 2025-01-16 ENCOUNTER — OFFICE VISIT (OUTPATIENT)
Dept: PRIMARY CARE CLINIC | Age: 55
End: 2025-01-16

## 2025-01-16 VITALS
OXYGEN SATURATION: 96 % | DIASTOLIC BLOOD PRESSURE: 66 MMHG | BODY MASS INDEX: 30.23 KG/M2 | TEMPERATURE: 97.5 F | WEIGHT: 154 LBS | SYSTOLIC BLOOD PRESSURE: 138 MMHG | HEART RATE: 91 BPM | HEIGHT: 60 IN

## 2025-01-16 DIAGNOSIS — F33.2 MAJOR DEPRESSIVE DISORDER, RECURRENT EPISODE, SEVERE WITH MIXED FEATURES (HCC): Primary | ICD-10-CM

## 2025-01-16 DIAGNOSIS — I10 ESSENTIAL HYPERTENSION: ICD-10-CM

## 2025-01-16 DIAGNOSIS — R26.89 IMBALANCE: ICD-10-CM

## 2025-01-16 DIAGNOSIS — M25.539 CHRONIC WRIST PAIN, UNSPECIFIED LATERALITY: ICD-10-CM

## 2025-01-16 DIAGNOSIS — G89.29 CHRONIC WRIST PAIN, UNSPECIFIED LATERALITY: ICD-10-CM

## 2025-01-16 DIAGNOSIS — Z98.890 S/P BRAIN SURGERY: ICD-10-CM

## 2025-01-16 DIAGNOSIS — D32.9 MENINGIOMA (HCC): ICD-10-CM

## 2025-01-16 DIAGNOSIS — E78.2 MIXED HYPERLIPIDEMIA: ICD-10-CM

## 2025-01-16 LAB
ALBUMIN: 4.3 G/DL (ref 3.5–5.2)
ALP BLD-CCNC: 116 U/L (ref 40–129)
ALT SERPL-CCNC: 18 U/L (ref 0–40)
ANION GAP SERPL CALCULATED.3IONS-SCNC: 19 MMOL/L (ref 7–16)
AST SERPL-CCNC: 17 U/L (ref 0–39)
BILIRUB SERPL-MCNC: 1.1 MG/DL (ref 0–1.2)
BUN BLDV-MCNC: 13 MG/DL (ref 6–20)
CALCIUM SERPL-MCNC: 9.8 MG/DL (ref 8.6–10.2)
CHLORIDE BLD-SCNC: 102 MMOL/L (ref 98–107)
CHOLESTEROL, TOTAL: 175 MG/DL
CO2: 22 MMOL/L (ref 22–29)
CREAT SERPL-MCNC: 0.9 MG/DL (ref 0.7–1.2)
GFR, ESTIMATED: >90 ML/MIN/1.73M2
GLUCOSE BLD-MCNC: 97 MG/DL (ref 74–99)
HCT VFR BLD CALC: 43.6 % (ref 37–54)
HDLC SERPL-MCNC: 50 MG/DL
HEMOGLOBIN: 14.6 G/DL (ref 12.5–16.5)
LDL CHOLESTEROL: 102 MG/DL
MCH RBC QN AUTO: 27.4 PG (ref 26–35)
MCHC RBC AUTO-ENTMCNC: 33.5 G/DL (ref 32–34.5)
MCV RBC AUTO: 82 FL (ref 80–99.9)
PDW BLD-RTO: 13.5 % (ref 11.5–15)
PLATELET # BLD: 283 K/UL (ref 130–450)
PMV BLD AUTO: 9.7 FL (ref 7–12)
POTASSIUM SERPL-SCNC: 4.5 MMOL/L (ref 3.5–5)
RBC # BLD: 5.32 M/UL (ref 3.8–5.8)
SODIUM BLD-SCNC: 143 MMOL/L (ref 132–146)
TOTAL PROTEIN: 7.2 G/DL (ref 6.4–8.3)
TRIGL SERPL-MCNC: 116 MG/DL
TSH SERPL DL<=0.05 MIU/L-ACNC: 3.78 UIU/ML (ref 0.27–4.2)
VLDLC SERPL CALC-MCNC: 23 MG/DL
WBC # BLD: 6.9 K/UL (ref 4.5–11.5)

## 2025-01-16 RX ORDER — SERTRALINE HYDROCHLORIDE 100 MG/1
100 TABLET, FILM COATED ORAL DAILY
COMMUNITY
Start: 2024-10-31

## 2025-01-16 RX ORDER — VALBENAZINE 40 MG/1
CAPSULE ORAL
COMMUNITY
Start: 2024-11-05 | End: 2025-01-16

## 2025-01-16 RX ORDER — AMLODIPINE BESYLATE 2.5 MG/1
2.5 TABLET ORAL DAILY
COMMUNITY
Start: 2024-12-23 | End: 2025-01-16

## 2025-01-16 RX ORDER — IBUPROFEN 600 MG/1
600 TABLET, FILM COATED ORAL EVERY 8 HOURS PRN
COMMUNITY
Start: 2024-10-15 | End: 2025-01-16

## 2025-01-16 RX ORDER — ATORVASTATIN CALCIUM 40 MG/1
40 TABLET, FILM COATED ORAL DAILY
COMMUNITY
End: 2025-01-16

## 2025-01-16 ASSESSMENT — PATIENT HEALTH QUESTIONNAIRE - PHQ9
7. TROUBLE CONCENTRATING ON THINGS, SUCH AS READING THE NEWSPAPER OR WATCHING TELEVISION: SEVERAL DAYS
SUM OF ALL RESPONSES TO PHQ9 QUESTIONS 1 & 2: 0
1. LITTLE INTEREST OR PLEASURE IN DOING THINGS: NOT AT ALL
3. TROUBLE FALLING OR STAYING ASLEEP: NOT AT ALL
4. FEELING TIRED OR HAVING LITTLE ENERGY: NOT AT ALL
SUM OF ALL RESPONSES TO PHQ QUESTIONS 1-9: 5
10. IF YOU CHECKED OFF ANY PROBLEMS, HOW DIFFICULT HAVE THESE PROBLEMS MADE IT FOR YOU TO DO YOUR WORK, TAKE CARE OF THINGS AT HOME, OR GET ALONG WITH OTHER PEOPLE: SOMEWHAT DIFFICULT
SUM OF ALL RESPONSES TO PHQ QUESTIONS 1-9: 5
SUM OF ALL RESPONSES TO PHQ QUESTIONS 1-9: 5
2. FEELING DOWN, DEPRESSED OR HOPELESS: NOT AT ALL
9. THOUGHTS THAT YOU WOULD BE BETTER OFF DEAD, OR OF HURTING YOURSELF: NOT AT ALL
SUM OF ALL RESPONSES TO PHQ QUESTIONS 1-9: 5
8. MOVING OR SPEAKING SO SLOWLY THAT OTHER PEOPLE COULD HAVE NOTICED. OR THE OPPOSITE, BEING SO FIGETY OR RESTLESS THAT YOU HAVE BEEN MOVING AROUND A LOT MORE THAN USUAL: NOT AT ALL
5. POOR APPETITE OR OVEREATING: NEARLY EVERY DAY
6. FEELING BAD ABOUT YOURSELF - OR THAT YOU ARE A FAILURE OR HAVE LET YOURSELF OR YOUR FAMILY DOWN: SEVERAL DAYS
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
## 2025-04-30 ENCOUNTER — OFFICE VISIT (OUTPATIENT)
Dept: PRIMARY CARE CLINIC | Age: 55
End: 2025-04-30
Payer: MEDICAID

## 2025-04-30 VITALS
SYSTOLIC BLOOD PRESSURE: 136 MMHG | HEART RATE: 88 BPM | OXYGEN SATURATION: 96 % | BODY MASS INDEX: 30.23 KG/M2 | WEIGHT: 154 LBS | TEMPERATURE: 96.9 F | HEIGHT: 60 IN | RESPIRATION RATE: 16 BRPM | DIASTOLIC BLOOD PRESSURE: 70 MMHG

## 2025-04-30 DIAGNOSIS — F33.2 MAJOR DEPRESSIVE DISORDER, RECURRENT EPISODE, SEVERE WITH MIXED FEATURES (HCC): ICD-10-CM

## 2025-04-30 DIAGNOSIS — R91.8 GROUND GLASS OPACITY PRESENT ON IMAGING OF LUNG: ICD-10-CM

## 2025-04-30 DIAGNOSIS — I10 ESSENTIAL HYPERTENSION: ICD-10-CM

## 2025-04-30 DIAGNOSIS — R06.02 SHORTNESS OF BREATH: Primary | ICD-10-CM

## 2025-04-30 PROCEDURE — 99214 OFFICE O/P EST MOD 30 MIN: CPT | Performed by: STUDENT IN AN ORGANIZED HEALTH CARE EDUCATION/TRAINING PROGRAM

## 2025-04-30 PROCEDURE — G8417 CALC BMI ABV UP PARAM F/U: HCPCS | Performed by: STUDENT IN AN ORGANIZED HEALTH CARE EDUCATION/TRAINING PROGRAM

## 2025-04-30 PROCEDURE — 3075F SYST BP GE 130 - 139MM HG: CPT | Performed by: STUDENT IN AN ORGANIZED HEALTH CARE EDUCATION/TRAINING PROGRAM

## 2025-04-30 PROCEDURE — 3078F DIAST BP <80 MM HG: CPT | Performed by: STUDENT IN AN ORGANIZED HEALTH CARE EDUCATION/TRAINING PROGRAM

## 2025-04-30 PROCEDURE — G8427 DOCREV CUR MEDS BY ELIG CLIN: HCPCS | Performed by: STUDENT IN AN ORGANIZED HEALTH CARE EDUCATION/TRAINING PROGRAM

## 2025-04-30 PROCEDURE — 3017F COLORECTAL CA SCREEN DOC REV: CPT | Performed by: STUDENT IN AN ORGANIZED HEALTH CARE EDUCATION/TRAINING PROGRAM

## 2025-04-30 PROCEDURE — G2211 COMPLEX E/M VISIT ADD ON: HCPCS | Performed by: STUDENT IN AN ORGANIZED HEALTH CARE EDUCATION/TRAINING PROGRAM

## 2025-04-30 PROCEDURE — 1036F TOBACCO NON-USER: CPT | Performed by: STUDENT IN AN ORGANIZED HEALTH CARE EDUCATION/TRAINING PROGRAM

## 2025-05-13 ENCOUNTER — OFFICE VISIT (OUTPATIENT)
Dept: NEUROSURGERY | Age: 55
End: 2025-05-13
Payer: MEDICAID

## 2025-05-13 VITALS
HEART RATE: 96 BPM | BODY MASS INDEX: 32.59 KG/M2 | RESPIRATION RATE: 16 BRPM | SYSTOLIC BLOOD PRESSURE: 150 MMHG | WEIGHT: 166 LBS | DIASTOLIC BLOOD PRESSURE: 75 MMHG | HEIGHT: 60 IN | OXYGEN SATURATION: 99 %

## 2025-05-13 DIAGNOSIS — R26.89 IMBALANCE: ICD-10-CM

## 2025-05-13 DIAGNOSIS — M54.12 CERVICAL RADICULOPATHY: ICD-10-CM

## 2025-05-13 DIAGNOSIS — Z98.890 S/P CRANIOTOMY: ICD-10-CM

## 2025-05-13 DIAGNOSIS — D32.9 MENINGIOMA (HCC): Primary | ICD-10-CM

## 2025-05-13 PROCEDURE — 3077F SYST BP >= 140 MM HG: CPT | Performed by: STUDENT IN AN ORGANIZED HEALTH CARE EDUCATION/TRAINING PROGRAM

## 2025-05-13 PROCEDURE — 3017F COLORECTAL CA SCREEN DOC REV: CPT | Performed by: STUDENT IN AN ORGANIZED HEALTH CARE EDUCATION/TRAINING PROGRAM

## 2025-05-13 PROCEDURE — G8417 CALC BMI ABV UP PARAM F/U: HCPCS | Performed by: STUDENT IN AN ORGANIZED HEALTH CARE EDUCATION/TRAINING PROGRAM

## 2025-05-13 PROCEDURE — 99213 OFFICE O/P EST LOW 20 MIN: CPT | Performed by: STUDENT IN AN ORGANIZED HEALTH CARE EDUCATION/TRAINING PROGRAM

## 2025-05-13 PROCEDURE — G8427 DOCREV CUR MEDS BY ELIG CLIN: HCPCS | Performed by: STUDENT IN AN ORGANIZED HEALTH CARE EDUCATION/TRAINING PROGRAM

## 2025-05-13 PROCEDURE — 3078F DIAST BP <80 MM HG: CPT | Performed by: STUDENT IN AN ORGANIZED HEALTH CARE EDUCATION/TRAINING PROGRAM

## 2025-05-13 PROCEDURE — 1036F TOBACCO NON-USER: CPT | Performed by: STUDENT IN AN ORGANIZED HEALTH CARE EDUCATION/TRAINING PROGRAM

## 2025-05-13 RX ORDER — ATORVASTATIN CALCIUM 40 MG/1
TABLET, FILM COATED ORAL
COMMUNITY
Start: 2025-05-12

## 2025-05-13 RX ORDER — OMEPRAZOLE 40 MG/1
CAPSULE, DELAYED RELEASE ORAL
COMMUNITY
Start: 2025-05-12

## 2025-05-13 RX ORDER — AMLODIPINE BESYLATE 2.5 MG/1
TABLET ORAL
COMMUNITY
Start: 2025-05-12

## 2025-05-13 NOTE — PROGRESS NOTES
Problem Focused Office Visit     Subjective: Prabhakar Reynolds is a 54 y.o.  male who has a past medical history of previous craniotomy and known meningioma. Patient originally saw Dr. Post for this meningioma. He presents today for follow up.     Patient presents today and states he is doing okay. He admits to balance issues and trouble with fine motor movements in his hands. He denies any headaches. No numbness or weakness. No other complaints.      Physical Exam  HENT:      Head: Normocephalic.   Eyes:      Pupils: Pupils are equal, round, and reactive to light.   Cardiovascular:      Rate and Rhythm: Normal rate.   Pulmonary:      Effort: Pulmonary effort is normal.   Abdominal:      General: There is no distension.   Musculoskeletal:         General: Normal range of motion.      Cervical back: Normal range of motion.   Skin:     General: Skin is warm and dry.   Neurological:      Mental Status: He is alert.      Comments: A&Ox3  CN3-12 intact  Motor Strength full   Sensation intact to light touch   (-)Hazel    Psychiatric:         Thought Content: Thought content normal.                Assessment: This is a 54 y.o.  male presenting for office follow up on meningioma. Patient also complaining of balance issues and trouble with fine motor skills.      Plan:  -Pain control and expectations discussed  -Obtain MRI Brain to assess meningioma and Cervical spine to evaluate for stenosis   -OARRS report reviewed   -Call/Return to Neurosurgery clinic after completion of imaging to discuss results and further treatment plan  -Call/return sooner if symptoms worsen or new issues arise in the interim     Electronically signed by Arlene Holt PA-C on 5/13/2025 at 11:13 AM

## 2025-06-17 ENCOUNTER — OFFICE VISIT (OUTPATIENT)
Dept: ORTHOPEDIC SURGERY | Age: 55
End: 2025-06-17
Payer: MEDICAID

## 2025-06-17 VITALS — HEIGHT: 60 IN | TEMPERATURE: 98 F | BODY MASS INDEX: 32.59 KG/M2 | WEIGHT: 166 LBS

## 2025-06-17 DIAGNOSIS — M79.641 BILATERAL HAND PAIN: Primary | ICD-10-CM

## 2025-06-17 DIAGNOSIS — M79.642 BILATERAL HAND PAIN: Primary | ICD-10-CM

## 2025-06-17 PROCEDURE — G8427 DOCREV CUR MEDS BY ELIG CLIN: HCPCS | Performed by: ORTHOPAEDIC SURGERY

## 2025-06-17 PROCEDURE — 3017F COLORECTAL CA SCREEN DOC REV: CPT | Performed by: ORTHOPAEDIC SURGERY

## 2025-06-17 PROCEDURE — 1036F TOBACCO NON-USER: CPT | Performed by: ORTHOPAEDIC SURGERY

## 2025-06-17 PROCEDURE — G8417 CALC BMI ABV UP PARAM F/U: HCPCS | Performed by: ORTHOPAEDIC SURGERY

## 2025-06-17 PROCEDURE — 99213 OFFICE O/P EST LOW 20 MIN: CPT | Performed by: ORTHOPAEDIC SURGERY

## 2025-06-17 ASSESSMENT — ENCOUNTER SYMPTOMS
EYE DISCHARGE: 0
SHORTNESS OF BREATH: 0
ABDOMINAL DISTENTION: 0
ALLERGIC/IMMUNOLOGIC NEGATIVE: 1
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
## 2025-07-15 ENCOUNTER — OFFICE VISIT (OUTPATIENT)
Dept: PRIMARY CARE CLINIC | Age: 55
End: 2025-07-15
Payer: MEDICAID

## 2025-07-15 VITALS
SYSTOLIC BLOOD PRESSURE: 130 MMHG | HEART RATE: 104 BPM | DIASTOLIC BLOOD PRESSURE: 62 MMHG | WEIGHT: 160 LBS | OXYGEN SATURATION: 96 % | BODY MASS INDEX: 31.25 KG/M2 | RESPIRATION RATE: 16 BRPM | TEMPERATURE: 96.9 F

## 2025-07-15 DIAGNOSIS — Z11.3 SCREENING EXAMINATION FOR STD (SEXUALLY TRANSMITTED DISEASE): ICD-10-CM

## 2025-07-15 DIAGNOSIS — K21.9 GASTROESOPHAGEAL REFLUX DISEASE, UNSPECIFIED WHETHER ESOPHAGITIS PRESENT: Primary | ICD-10-CM

## 2025-07-15 LAB — RPR: NONREACTIVE

## 2025-07-15 PROCEDURE — 1036F TOBACCO NON-USER: CPT | Performed by: STUDENT IN AN ORGANIZED HEALTH CARE EDUCATION/TRAINING PROGRAM

## 2025-07-15 PROCEDURE — 3017F COLORECTAL CA SCREEN DOC REV: CPT | Performed by: STUDENT IN AN ORGANIZED HEALTH CARE EDUCATION/TRAINING PROGRAM

## 2025-07-15 PROCEDURE — 3078F DIAST BP <80 MM HG: CPT | Performed by: STUDENT IN AN ORGANIZED HEALTH CARE EDUCATION/TRAINING PROGRAM

## 2025-07-15 PROCEDURE — G2211 COMPLEX E/M VISIT ADD ON: HCPCS | Performed by: STUDENT IN AN ORGANIZED HEALTH CARE EDUCATION/TRAINING PROGRAM

## 2025-07-15 PROCEDURE — 99214 OFFICE O/P EST MOD 30 MIN: CPT | Performed by: STUDENT IN AN ORGANIZED HEALTH CARE EDUCATION/TRAINING PROGRAM

## 2025-07-15 PROCEDURE — G8417 CALC BMI ABV UP PARAM F/U: HCPCS | Performed by: STUDENT IN AN ORGANIZED HEALTH CARE EDUCATION/TRAINING PROGRAM

## 2025-07-15 PROCEDURE — G8427 DOCREV CUR MEDS BY ELIG CLIN: HCPCS | Performed by: STUDENT IN AN ORGANIZED HEALTH CARE EDUCATION/TRAINING PROGRAM

## 2025-07-15 PROCEDURE — 3075F SYST BP GE 130 - 139MM HG: CPT | Performed by: STUDENT IN AN ORGANIZED HEALTH CARE EDUCATION/TRAINING PROGRAM

## 2025-07-15 RX ORDER — OMEPRAZOLE 40 MG/1
40 CAPSULE, DELAYED RELEASE ORAL DAILY
Qty: 30 CAPSULE | Refills: 3 | Status: CANCELLED | OUTPATIENT
Start: 2025-07-15

## 2025-07-15 RX ORDER — PANTOPRAZOLE SODIUM 40 MG/1
40 TABLET, DELAYED RELEASE ORAL
Qty: 30 TABLET | Refills: 3 | Status: SHIPPED | OUTPATIENT
Start: 2025-07-15

## 2025-07-15 NOTE — PROGRESS NOTES
Onset    Stroke Mother     Alcohol Abuse Father     Cancer Father        Review of Systems:   Review of Systems - as above     Physical Exam   Vitals: /62   Pulse (!) 104   Temp 96.9 °F (36.1 °C) (Infrared)   Resp 16   Wt 72.6 kg (160 lb)   SpO2 96%   BMI 31.25 kg/m²   Physical Exam  Constitutional:       Appearance: He is well-developed.   HENT:      Head: Normocephalic and atraumatic.   Eyes:      General:         Right eye: No discharge.         Left eye: No discharge.      Conjunctiva/sclera: Conjunctivae normal.   Neck:      Trachea: No tracheal deviation.   Cardiovascular:      Rate and Rhythm: Normal rate and regular rhythm.      Heart sounds: Normal heart sounds.   Pulmonary:      Effort: Pulmonary effort is normal. No respiratory distress.      Breath sounds: Normal breath sounds. No wheezing.   Abdominal:      General: Bowel sounds are normal. There is no distension.      Palpations: Abdomen is soft.      Tenderness: There is no abdominal tenderness.   Musculoskeletal:         General: No tenderness.      Cervical back: Normal range of motion and neck supple.   Skin:     General: Skin is warm and dry.   Neurological:      Mental Status: He is alert.   Psychiatric:         Behavior: Behavior normal.             Assessment and Plan       1. Gastroesophageal reflux disease, unspecified whether esophagitis present  F/u of chronic issue  -Worsening since being off meds, thus will start protonix, f/u in 3 months, consider doing PRN then   - pantoprazole (PROTONIX) 40 MG tablet; Take 1 tablet by mouth daily (with breakfast)  Dispense: 30 tablet; Refill: 3    2. Screening examination for STD (sexually transmitted disease)  - C.trachomatis N.gonorrhoeae DNA  - RPR  - Trichomonas Screen      I am the primary care provider for this patient and provide the patient with continuity of care.      Counseled regarding above diagnosis, including possible risks and complications,  especially if left uncontrolled.

## 2025-07-17 LAB
C. TRACHOMATIS DNA ,URINE: NEGATIVE
N. GONORRHOEAE DNA, URINE: NEGATIVE

## 2025-07-20 LAB
CULTURE: NORMAL
SPECIMEN DESCRIPTION: NORMAL

## 2025-08-03 ENCOUNTER — APPOINTMENT (OUTPATIENT)
Dept: GENERAL RADIOLOGY | Age: 55
End: 2025-08-03
Payer: MEDICAID

## 2025-08-03 ENCOUNTER — HOSPITAL ENCOUNTER (EMERGENCY)
Age: 55
Discharge: HOME | End: 2025-08-04
Attending: STUDENT IN AN ORGANIZED HEALTH CARE EDUCATION/TRAINING PROGRAM
Payer: MEDICAID

## 2025-08-03 DIAGNOSIS — R07.9 CHEST PAIN, UNSPECIFIED TYPE: Primary | ICD-10-CM

## 2025-08-03 DIAGNOSIS — R10.13 DYSPEPSIA: ICD-10-CM

## 2025-08-03 LAB
BASOPHILS # BLD: 0.05 K/UL (ref 0–0.2)
BASOPHILS NFR BLD: 1 % (ref 0–2)
EOSINOPHIL # BLD: 0.43 K/UL (ref 0.05–0.5)
EOSINOPHILS RELATIVE PERCENT: 6 % (ref 0–6)
ERYTHROCYTE [DISTWIDTH] IN BLOOD BY AUTOMATED COUNT: 13.1 % (ref 11.5–15)
HCT VFR BLD AUTO: 43.2 % (ref 37–54)
HGB BLD-MCNC: 15.3 G/DL (ref 12.5–16.5)
IMM GRANULOCYTES # BLD AUTO: <0.03 K/UL (ref 0–0.58)
IMM GRANULOCYTES NFR BLD: 0 % (ref 0–5)
LYMPHOCYTES NFR BLD: 1.99 K/UL (ref 1.5–4)
LYMPHOCYTES RELATIVE PERCENT: 26 % (ref 20–42)
MCH RBC QN AUTO: 28.4 PG (ref 26–35)
MCHC RBC AUTO-ENTMCNC: 35.4 G/DL (ref 32–34.5)
MCV RBC AUTO: 80.3 FL (ref 80–99.9)
MONOCYTES NFR BLD: 0.72 K/UL (ref 0.1–0.95)
MONOCYTES NFR BLD: 9 % (ref 2–12)
NEUTROPHILS NFR BLD: 58 % (ref 43–80)
NEUTS SEG NFR BLD: 4.42 K/UL (ref 1.8–7.3)
PLATELET # BLD AUTO: 257 K/UL (ref 130–450)
PMV BLD AUTO: 9.7 FL (ref 7–12)
RBC # BLD AUTO: 5.38 M/UL (ref 3.8–5.8)
WBC OTHER # BLD: 7.6 K/UL (ref 4.5–11.5)

## 2025-08-03 PROCEDURE — 99285 EMERGENCY DEPT VISIT HI MDM: CPT

## 2025-08-03 PROCEDURE — 85025 COMPLETE CBC W/AUTO DIFF WBC: CPT

## 2025-08-03 PROCEDURE — 83690 ASSAY OF LIPASE: CPT

## 2025-08-03 PROCEDURE — 80053 COMPREHEN METABOLIC PANEL: CPT

## 2025-08-03 PROCEDURE — 96374 THER/PROPH/DIAG INJ IV PUSH: CPT

## 2025-08-03 PROCEDURE — 84484 ASSAY OF TROPONIN QUANT: CPT

## 2025-08-03 PROCEDURE — 6360000002 HC RX W HCPCS: Performed by: STUDENT IN AN ORGANIZED HEALTH CARE EDUCATION/TRAINING PROGRAM

## 2025-08-03 PROCEDURE — 93005 ELECTROCARDIOGRAM TRACING: CPT | Performed by: STUDENT IN AN ORGANIZED HEALTH CARE EDUCATION/TRAINING PROGRAM

## 2025-08-03 PROCEDURE — 83605 ASSAY OF LACTIC ACID: CPT

## 2025-08-03 PROCEDURE — 6370000000 HC RX 637 (ALT 250 FOR IP): Performed by: STUDENT IN AN ORGANIZED HEALTH CARE EDUCATION/TRAINING PROGRAM

## 2025-08-03 PROCEDURE — 71045 X-RAY EXAM CHEST 1 VIEW: CPT

## 2025-08-03 RX ORDER — PANTOPRAZOLE SODIUM 40 MG/10ML
40 INJECTION, POWDER, LYOPHILIZED, FOR SOLUTION INTRAVENOUS ONCE
Status: COMPLETED | OUTPATIENT
Start: 2025-08-03 | End: 2025-08-03

## 2025-08-03 RX ADMIN — LIDOCAINE HYDROCHLORIDE: 20 SOLUTION ORAL at 23:48

## 2025-08-03 RX ADMIN — PANTOPRAZOLE SODIUM 40 MG: 40 INJECTION, POWDER, FOR SOLUTION INTRAVENOUS at 23:48

## 2025-08-03 ASSESSMENT — PAIN - FUNCTIONAL ASSESSMENT: PAIN_FUNCTIONAL_ASSESSMENT: 0-10

## 2025-08-03 ASSESSMENT — PAIN SCALES - GENERAL: PAINLEVEL_OUTOF10: 5

## 2025-08-03 ASSESSMENT — PAIN DESCRIPTION - ORIENTATION: ORIENTATION: MID

## 2025-08-03 ASSESSMENT — PAIN DESCRIPTION - LOCATION: LOCATION: CHEST

## 2025-08-03 ASSESSMENT — PAIN DESCRIPTION - DESCRIPTORS: DESCRIPTORS: BURNING;DISCOMFORT;TENDER

## 2025-08-04 VITALS
HEART RATE: 82 BPM | OXYGEN SATURATION: 98 % | RESPIRATION RATE: 16 BRPM | SYSTOLIC BLOOD PRESSURE: 138 MMHG | TEMPERATURE: 97.9 F | DIASTOLIC BLOOD PRESSURE: 88 MMHG

## 2025-08-04 LAB
ALBUMIN SERPL-MCNC: 4.2 G/DL (ref 3.5–5.2)
ALP SERPL-CCNC: 111 U/L (ref 40–129)
ALT SERPL-CCNC: 20 U/L (ref 0–50)
ANION GAP SERPL CALCULATED.3IONS-SCNC: 13 MMOL/L (ref 7–16)
AST SERPL-CCNC: 20 U/L (ref 0–50)
BILIRUB SERPL-MCNC: 1 MG/DL (ref 0–1.2)
BUN SERPL-MCNC: 14 MG/DL (ref 6–20)
CALCIUM SERPL-MCNC: 9.1 MG/DL (ref 8.6–10)
CHLORIDE SERPL-SCNC: 99 MMOL/L (ref 98–107)
CO2 SERPL-SCNC: 25 MMOL/L (ref 22–29)
CREAT SERPL-MCNC: 0.9 MG/DL (ref 0.7–1.2)
EKG ATRIAL RATE: 93 BPM
EKG P AXIS: 70 DEGREES
EKG P-R INTERVAL: 158 MS
EKG Q-T INTERVAL: 358 MS
EKG QRS DURATION: 78 MS
EKG QTC CALCULATION (BAZETT): 445 MS
EKG R AXIS: 45 DEGREES
EKG T AXIS: 44 DEGREES
EKG VENTRICULAR RATE: 93 BPM
GFR, ESTIMATED: >90 ML/MIN/1.73M2
GLUCOSE SERPL-MCNC: 126 MG/DL (ref 74–99)
LACTATE BLDV-SCNC: 1.4 MMOL/L (ref 0.5–2.2)
LIPASE SERPL-CCNC: 83 U/L (ref 13–60)
POTASSIUM SERPL-SCNC: 3.6 MMOL/L (ref 3.5–5.1)
PROT SERPL-MCNC: 6.7 G/DL (ref 6.4–8.3)
SODIUM SERPL-SCNC: 137 MMOL/L (ref 136–145)
TROPONIN I SERPL HS-MCNC: <6 NG/L (ref 0–22)
TROPONIN I SERPL HS-MCNC: <6 NG/L (ref 0–22)

## 2025-08-04 PROCEDURE — 2580000003 HC RX 258: Performed by: STUDENT IN AN ORGANIZED HEALTH CARE EDUCATION/TRAINING PROGRAM

## 2025-08-04 PROCEDURE — 84484 ASSAY OF TROPONIN QUANT: CPT

## 2025-08-04 PROCEDURE — 93010 ELECTROCARDIOGRAM REPORT: CPT | Performed by: INTERNAL MEDICINE

## 2025-08-04 RX ORDER — 0.9 % SODIUM CHLORIDE 0.9 %
1000 INTRAVENOUS SOLUTION INTRAVENOUS ONCE
Status: COMPLETED | OUTPATIENT
Start: 2025-08-04 | End: 2025-08-04

## 2025-08-04 RX ORDER — PANTOPRAZOLE SODIUM 40 MG/1
40 TABLET, DELAYED RELEASE ORAL DAILY
Qty: 10 TABLET | Refills: 0 | Status: SHIPPED | OUTPATIENT
Start: 2025-08-04 | End: 2025-08-14

## 2025-08-04 RX ADMIN — SODIUM CHLORIDE 1000 ML: 0.9 INJECTION, SOLUTION INTRAVENOUS at 00:24

## 2025-08-04 ASSESSMENT — PAIN DESCRIPTION - LOCATION: LOCATION: CHEST

## 2025-08-04 ASSESSMENT — PAIN SCALES - GENERAL: PAINLEVEL_OUTOF10: 0

## 2025-08-19 ENCOUNTER — HOSPITAL ENCOUNTER (EMERGENCY)
Age: 55
Discharge: HOME | End: 2025-08-20
Attending: STUDENT IN AN ORGANIZED HEALTH CARE EDUCATION/TRAINING PROGRAM
Payer: MEDICAID

## 2025-08-19 VITALS
SYSTOLIC BLOOD PRESSURE: 150 MMHG | DIASTOLIC BLOOD PRESSURE: 74 MMHG | HEART RATE: 91 BPM | TEMPERATURE: 98.2 F | OXYGEN SATURATION: 97 % | RESPIRATION RATE: 18 BRPM

## 2025-08-19 DIAGNOSIS — M54.50 ACUTE LOW BACK PAIN, UNSPECIFIED BACK PAIN LATERALITY, UNSPECIFIED WHETHER SCIATICA PRESENT: Primary | ICD-10-CM

## 2025-08-19 PROCEDURE — 96372 THER/PROPH/DIAG INJ SC/IM: CPT

## 2025-08-19 PROCEDURE — 6360000002 HC RX W HCPCS: Performed by: STUDENT IN AN ORGANIZED HEALTH CARE EDUCATION/TRAINING PROGRAM

## 2025-08-19 PROCEDURE — 99283 EMERGENCY DEPT VISIT LOW MDM: CPT

## 2025-08-19 RX ORDER — ORPHENADRINE CITRATE 30 MG/ML
60 INJECTION INTRAMUSCULAR; INTRAVENOUS ONCE
Status: COMPLETED | OUTPATIENT
Start: 2025-08-19 | End: 2025-08-19

## 2025-08-19 RX ORDER — KETOROLAC TROMETHAMINE 30 MG/ML
30 INJECTION, SOLUTION INTRAMUSCULAR; INTRAVENOUS ONCE
Status: COMPLETED | OUTPATIENT
Start: 2025-08-19 | End: 2025-08-19

## 2025-08-19 RX ADMIN — KETOROLAC TROMETHAMINE 30 MG: 30 INJECTION, SOLUTION INTRAMUSCULAR at 23:44

## 2025-08-19 RX ADMIN — ORPHENADRINE CITRATE 60 MG: 30 INJECTION, SOLUTION INTRAMUSCULAR; INTRAVENOUS at 23:43

## 2025-08-19 ASSESSMENT — ENCOUNTER SYMPTOMS
SINUS PRESSURE: 0
EYE DISCHARGE: 0
NAUSEA: 0
EYE REDNESS: 0
VOMITING: 0
DIARRHEA: 0
WHEEZING: 0
SORE THROAT: 0
SHORTNESS OF BREATH: 0
EYE PAIN: 0
COUGH: 0
ABDOMINAL PAIN: 0
BACK PAIN: 1

## 2025-08-19 ASSESSMENT — PAIN DESCRIPTION - ORIENTATION: ORIENTATION: LOWER

## 2025-08-19 ASSESSMENT — PAIN SCALES - GENERAL: PAINLEVEL_OUTOF10: 8

## 2025-08-19 ASSESSMENT — PAIN DESCRIPTION - DESCRIPTORS: DESCRIPTORS: THROBBING;SHARP;ACHING

## 2025-08-19 ASSESSMENT — PAIN - FUNCTIONAL ASSESSMENT: PAIN_FUNCTIONAL_ASSESSMENT: 0-10

## 2025-08-19 ASSESSMENT — PAIN DESCRIPTION - LOCATION: LOCATION: BACK

## 2025-08-20 ENCOUNTER — HOSPITAL ENCOUNTER (OUTPATIENT)
Dept: MRI IMAGING | Age: 55
Discharge: HOME | End: 2025-08-22
Payer: MEDICAID

## 2025-08-20 DIAGNOSIS — Z98.890 S/P CRANIOTOMY: ICD-10-CM

## 2025-08-20 DIAGNOSIS — R26.89 IMBALANCE: ICD-10-CM

## 2025-08-20 DIAGNOSIS — D32.9 MENINGIOMA (HCC): ICD-10-CM

## 2025-08-20 DIAGNOSIS — M54.12 CERVICAL RADICULOPATHY: ICD-10-CM

## 2025-08-20 PROCEDURE — 6360000004 HC RX CONTRAST MEDICATION: Performed by: RADIOLOGY

## 2025-08-20 PROCEDURE — A9579 GAD-BASE MR CONTRAST NOS,1ML: HCPCS | Performed by: RADIOLOGY

## 2025-08-20 PROCEDURE — 72141 MRI NECK SPINE W/O DYE: CPT

## 2025-08-20 PROCEDURE — 70553 MRI BRAIN STEM W/O & W/DYE: CPT

## 2025-08-20 RX ORDER — CYCLOBENZAPRINE HCL 5 MG
5 TABLET ORAL 2 TIMES DAILY PRN
Qty: 10 TABLET | Refills: 0 | Status: SHIPPED | OUTPATIENT
Start: 2025-08-20 | End: 2025-08-30

## 2025-08-20 RX ORDER — GADOTERIDOL 279.3 MG/ML
17 INJECTION INTRAVENOUS
Status: COMPLETED | OUTPATIENT
Start: 2025-08-20 | End: 2025-08-20

## 2025-08-20 RX ORDER — METHYLPREDNISOLONE 4 MG/1
TABLET ORAL
Qty: 1 KIT | Refills: 0 | Status: SHIPPED | OUTPATIENT
Start: 2025-08-20 | End: 2025-08-26

## 2025-08-20 RX ADMIN — GADOTERIDOL 17 ML: 279.3 INJECTION, SOLUTION INTRAVENOUS at 13:05
